# Patient Record
Sex: MALE | Race: BLACK OR AFRICAN AMERICAN | Employment: FULL TIME | URBAN - METROPOLITAN AREA
[De-identification: names, ages, dates, MRNs, and addresses within clinical notes are randomized per-mention and may not be internally consistent; named-entity substitution may affect disease eponyms.]

---

## 2017-01-04 DIAGNOSIS — G50.0 TRIGEMINAL NEURALGIA: Primary | ICD-10-CM

## 2017-01-04 RX ORDER — TRAMADOL HYDROCHLORIDE 50 MG/1
50-100 TABLET ORAL EVERY 6 HOURS PRN
Qty: 60 TABLET | Refills: 0 | Status: SHIPPED | OUTPATIENT
Start: 2017-01-04 | End: 2017-01-06

## 2017-01-04 NOTE — TELEPHONE ENCOUNTER
Tramadol      Last Written Prescription Date:  12/06/16  Last Fill Quantity: 60,   # refills: 0  Last Office Visit with [facility], [facility] or [facility] prescribing provider: 12/21/16  Future Office visit:    Next 5 appointments (look out 90 days)     Jan 06, 2017  3:00 PM   Office Visit with Gonzalo Cole MD   [facility] ([facility])    [address] [place] [id number] [phone]                   Routing refill request to provider for review/approval because:  Drug not on the [facility], [facility] or [facility] refill protocol or controlled substance      Thank you!  Divina Del Valle PhT  [facility]  On behalf of [facility]

## 2017-01-06 ENCOUNTER — OFFICE VISIT (OUTPATIENT)
Dept: FAMILY MEDICINE | Facility: CLINIC | Age: 47
End: 2017-01-06
Payer: COMMERCIAL

## 2017-01-06 VITALS
OXYGEN SATURATION: 96 % | DIASTOLIC BLOOD PRESSURE: 74 MMHG | HEART RATE: 94 BPM | BODY MASS INDEX: 38.44 KG/M2 | WEIGHT: 259.5 LBS | TEMPERATURE: 97.5 F | HEIGHT: 69 IN | RESPIRATION RATE: 18 BRPM | SYSTOLIC BLOOD PRESSURE: 122 MMHG

## 2017-01-06 DIAGNOSIS — F90.2 ATTENTION DEFICIT HYPERACTIVITY DISORDER (ADHD), COMBINED TYPE: ICD-10-CM

## 2017-01-06 DIAGNOSIS — I10 ESSENTIAL HYPERTENSION WITH GOAL BLOOD PRESSURE LESS THAN 140/90: Primary | ICD-10-CM

## 2017-01-06 DIAGNOSIS — F33.40 DEPRESSION, MAJOR, RECURRENT, IN REMISSION (H): ICD-10-CM

## 2017-01-06 DIAGNOSIS — G62.9 NEUROPATHY: ICD-10-CM

## 2017-01-06 PROCEDURE — 99214 OFFICE O/P EST MOD 30 MIN: CPT | Performed by: FAMILY MEDICINE

## 2017-01-06 RX ORDER — TRAMADOL HYDROCHLORIDE 50 MG/1
50-100 TABLET ORAL EVERY 6 HOURS PRN
Qty: 120 TABLET | Refills: 0 | Status: ON HOLD | OUTPATIENT
Start: 2017-01-19 | End: 2017-02-22

## 2017-01-06 ASSESSMENT — PAIN SCALES - GENERAL: PAINLEVEL: NO PAIN (0)

## 2017-01-06 NOTE — NURSING NOTE
"Chief Complaint   Patient presents with     Recheck Medication     oxy and tramidol     Hypertension       Initial /74 mmHg  Pulse 94  Temp(Src) 97.5  F (36.4  C) (Temporal)  Resp 18  Ht 5' 8.9\" (1.75 m)  Wt 259 lb 8 oz (117.708 kg)  BMI 38.44 kg/m2  SpO2 96% Estimated body mass index is 38.44 kg/(m^2) as calculated from the following:    Height as of this encounter: 5' 8.9\" (1.75 m).    Weight as of this encounter: 259 lb 8 oz (117.708 kg).  BP completed using cuff size: regular    "

## 2017-01-06 NOTE — PROGRESS NOTES
"  SUBJECTIVE:                                                    Cecil Vasquez is a 46 year old male who presents to clinic today for the following health issues:      Hypertension Follow-up      Outpatient blood pressures are being checked at home.  Results are 140's/90's.    Low Salt Diet: no added salt       Amount of exercise or physical activity: 2-3 days/week for an average of 15-30 minutes    Problems taking medications regularly: No    Medication side effects: none    Diet: low salt    Would also like to talk about pain medications.   .Luiza Austin CMA    Comes in to discuss chronic pain and peripheral neuropathy issues as well as ongoing mental health. Also here for blood pressure recheck. Currently feeling outstanding. Testes felt in over 6 months. He is coming off Percocet nicely, is only using sparingly. He has tramadol he uses twice daily with good effect. The real help has been gabapentin which is now at 903 times daily. Not expressing side effects with that. No fatigue etc. Takes melatonin at night for sleep. Mood is exceptionally good. He is back at work and quite happy.      Problem list and histories reviewed & adjusted, as indicated.  Additional history: as documented        ROS:  Constitutional, HEENT, cardiovascular, pulmonary, gi and gu systems are negative, except as otherwise noted.    OBJECTIVE:                                                    /74 mmHg  Pulse 94  Temp(Src) 97.5  F (36.4  C) (Temporal)  Resp 18  Ht 5' 8.9\" (1.75 m)  Wt 259 lb 8 oz (117.708 kg)  BMI 38.44 kg/m2  SpO2 96%  Body mass index is 38.44 kg/(m^2).  No exam         ASSESSMENT/PLAN:                                                              ICD-10-CM    1. Trigeminal neuralgia G50.0 traMADol (ULTRAM) 50 MG tablet   2. Attention deficit hyperactivity disorder (ADHD), combined type F90.2    3. Depression, major, recurrent, in remission (H) F33.40        Doing quite well. Continue the tramadol. " Limited use of Percocet, perhaps 30 in a month. Depression improved, as well as anxiety. Continue Wellbutrin and gabapentin. Peripheral neuropathy also controlled. See him back in to 3 months. Sooner as needed. Blood pressure controlled continue same regimen.     Gonzalo Cole MD  [facility]

## 2017-01-26 ENCOUNTER — TELEPHONE (OUTPATIENT)
Dept: FAMILY MEDICINE | Facility: CLINIC | Age: 47
End: 2017-01-26

## 2017-01-26 NOTE — TELEPHONE ENCOUNTER
Panel Management Review      Patient has the following on his problem list:     Asthma review     ACT Total Scores 12/16/2016   ACT TOTAL SCORE -   ASTHMA ER VISITS -   ASTHMA HOSPITALIZATIONS -   ACT TOTAL SCORE (Goal Greater than or Equal to 20) 14   In the past 12 months, how many times did you visit the emergency room for your asthma without being admitted to the hospital? 1   In the past 12 months, how many times were you hospitalized overnight because of your asthma? 0      1. Is Asthma diagnosis on the Problem List? Yes    2. Is Asthma listed on Health Maintenance? Yes    3. Patient is due for:  ACT      Composite cancer screening  Chart review shows that this patient is due/due soon for the following None  Summary:    Patient is due/failing the following:   ACT    Action needed:   Patient needs to do ACT.    Type of outreach:    Phone, left message for patient to call back.     Questions for provider review:    None                                                                                                                                    Luiza Austin CMA      Chart routed to Care Team .

## 2017-02-03 ENCOUNTER — OFFICE VISIT (OUTPATIENT)
Dept: FAMILY MEDICINE | Facility: CLINIC | Age: 47
End: 2017-02-03
Payer: COMMERCIAL

## 2017-02-03 ENCOUNTER — TELEPHONE (OUTPATIENT)
Dept: FAMILY MEDICINE | Facility: CLINIC | Age: 47
End: 2017-02-03

## 2017-02-03 VITALS
RESPIRATION RATE: 18 BRPM | WEIGHT: 256.1 LBS | OXYGEN SATURATION: 99 % | DIASTOLIC BLOOD PRESSURE: 86 MMHG | SYSTOLIC BLOOD PRESSURE: 146 MMHG | TEMPERATURE: 96.8 F | BODY MASS INDEX: 37.93 KG/M2 | HEART RATE: 100 BPM

## 2017-02-03 DIAGNOSIS — G62.9 NEUROPATHY: ICD-10-CM

## 2017-02-03 DIAGNOSIS — R07.9 ACUTE CHEST PAIN: Primary | ICD-10-CM

## 2017-02-03 DIAGNOSIS — J45.901 ASTHMA WITH ACUTE EXACERBATION, UNSPECIFIED ASTHMA SEVERITY: ICD-10-CM

## 2017-02-03 PROCEDURE — 93000 ELECTROCARDIOGRAM COMPLETE: CPT | Performed by: FAMILY MEDICINE

## 2017-02-03 PROCEDURE — 99214 OFFICE O/P EST MOD 30 MIN: CPT | Performed by: FAMILY MEDICINE

## 2017-02-03 RX ORDER — NITROGLYCERIN 0.4 MG/1
TABLET SUBLINGUAL
Qty: 25 TABLET | Refills: 0 | Status: SHIPPED | OUTPATIENT
Start: 2017-02-03 | End: 2017-02-19

## 2017-02-03 RX ORDER — ALBUTEROL SULFATE 90 UG/1
2 AEROSOL, METERED RESPIRATORY (INHALATION) EVERY 4 HOURS PRN
Qty: 1 INHALER | Refills: 0 | Status: ON HOLD | OUTPATIENT
Start: 2017-02-03 | End: 2017-02-20

## 2017-02-03 RX ORDER — OXYCODONE HYDROCHLORIDE 5 MG/1
5-10 TABLET ORAL EVERY 6 HOURS PRN
Qty: 30 TABLET | Refills: 0 | Status: SHIPPED | OUTPATIENT
Start: 2017-02-03 | End: 2017-02-28

## 2017-02-03 RX ORDER — GABAPENTIN 800 MG/1
TABLET ORAL
Qty: 210 TABLET | Refills: 1 | Status: SHIPPED | OUTPATIENT
Start: 2017-02-03 | End: 2017-02-13

## 2017-02-03 ASSESSMENT — PAIN SCALES - GENERAL: PAINLEVEL: SEVERE PAIN (6)

## 2017-02-03 NOTE — TELEPHONE ENCOUNTER
Called patient and left a detailed message stating that he forgot to stop at the lab and get his blood drawn.  Was advised to make a lab only appointment and get blood drawn at his convience.      Luiza Austin CMA

## 2017-02-03 NOTE — PROGRESS NOTES
SUBJECTIVE:                                                    Cecil Vasquez is a 46 year old male who presents to clinic today for the following health issues:      Chief Complaint   Patient presents with     RECHECK     medication-gabapentin     Returns for recheck. His pain is no local controlled on gabapentin, however he ran out and did not get a refill and time. He has been using oxycodone, but less and less. He has a neurology of the right parietal area.    He also complains of chest pain. This is occurring perhaps 5 times the last couple of months. This is new for him. He experiences a sharp pain substernally with exertion, pain also radiates into his left arm, but not into the jaw. He has no prior history of left shoulder issues. He has not noticed any shoulder pain per se. He does have asthma. During the episode lasted perhaps 20 minutes and he has associated diaphoresis and the radiation of pain as described. Last time this occurred he was lifting perhaps 20 pound boxes repetitively. He has a history of mild hypertension, now diet controlled. No personal history of cardiac disease.        Problem list and histories reviewed & adjusted, as indicated.  Additional history: as documented        ROS:  Constitutional, HEENT, cardiovascular, pulmonary, gi and gu systems are negative, except as otherwise noted.    OBJECTIVE:                                                    /86 mmHg  Pulse 100  Temp(Src) 96.8  F (36  C) (Temporal)  Resp 18  Wt 256 lb 1.6 oz (116.166 kg)  SpO2 99%  Body mass index is 37.93 kg/(m^2).  GENERAL: healthy, alert and no distress  EYES: Eyes grossly normal to inspection, PERRL and conjunctivae and sclerae normal  HENT: ear canals and TM's normal, nose and mouth without ulcers or lesions  NECK: no adenopathy, no asymmetry, masses, or scars and thyroid normal to palpation  RESP: lungs clear to auscultation, except for right lower lobe wheezes, good bilateral air entry  CV:  regular rate and rhythm, normal S1 S2, no S3 or S4, no murmur, click or rub, no peripheral edema and peripheral pulses strong  ABDOMEN: soft, nontender, no hepatosplenomegaly, no masses and bowel sounds normal  MS: no gross musculoskeletal defects noted, no edema    Diagnostic Test Results:  EKG shows normal sinus rhythm with a rate of 70. No T wave inversions. Normal intervals. Normal axis.      ASSESSMENT/PLAN:                                                              ICD-10-CM    1. Acute chest pain R07.9 aspirin 81 MG EC tablet     nitroglycerin (NITROSTAT) 0.4 MG sublingual tablet     EKG 12-lead complete w/read - Clinics     CBC with platelets     Basic metabolic panel     XR Chest 2 Views   2. Neuropathy (H) G62.9 gabapentin (NEURONTIN) 800 MG tablet     oxyCODONE (ROXICODONE) 5 MG IR tablet   3. Asthma with acute exacerbation, unspecified asthma severity J45.901 albuterol (ALBUTEROL) 108 (90 BASE) MCG/ACT Inhaler     Chest pain concerning for cardiac etiology. Currently symptom-free. He was given aspirin to start and nitroglycerin to take if pain recurs. Should be seen urgently if pain repeats. Workup so far unremarkable. We will schedule him for a stress EKG. His chronic pain is stable. Refills provided with a small dose change our for convenience. Refill provided for oxycodone. He does have wheezing on exam, but his peak flow was in the yellow zone. He should use his albuterol twice daily 2 puffs. It may be his asthma that is contributing to his chest symptoms during exertion as well. Follow-up based on testing results.      Gonzalo Cole MD  [facility]

## 2017-02-03 NOTE — NURSING NOTE
"Chief Complaint   Patient presents with     RECHECK     medication-gabapentin       Initial /86 mmHg  Pulse 100  Temp(Src) 96.8  F (36  C) (Temporal)  Resp 18  Wt 256 lb 1.6 oz (116.166 kg)  SpO2 99% Estimated body mass index is 37.93 kg/(m^2) as calculated from the following:    Height as of 1/6/17: 5' 8.9\" (1.75 m).    Weight as of this encounter: 256 lb 1.6 oz (116.166 kg).  BP completed using cuff size: Adonay Austin CMA     "

## 2017-02-13 ENCOUNTER — APPOINTMENT (OUTPATIENT)
Dept: GENERAL RADIOLOGY | Facility: CLINIC | Age: 47
End: 2017-02-13
Attending: FAMILY MEDICINE
Payer: COMMERCIAL

## 2017-02-13 ENCOUNTER — HOSPITAL ENCOUNTER (EMERGENCY)
Facility: CLINIC | Age: 47
Discharge: HOME | End: 2017-02-13
Attending: FAMILY MEDICINE | Admitting: FAMILY MEDICINE
Payer: COMMERCIAL

## 2017-02-13 ENCOUNTER — TELEPHONE (OUTPATIENT)
Dept: FAMILY MEDICINE | Facility: CLINIC | Age: 47
End: 2017-02-13

## 2017-02-13 VITALS
HEART RATE: 102 BPM | DIASTOLIC BLOOD PRESSURE: 112 MMHG | WEIGHT: 256 LBS | BODY MASS INDEX: 37.92 KG/M2 | OXYGEN SATURATION: 98 % | RESPIRATION RATE: 9 BRPM | TEMPERATURE: 97.9 F | SYSTOLIC BLOOD PRESSURE: 144 MMHG | HEIGHT: 69 IN

## 2017-02-13 DIAGNOSIS — R07.9 ACUTE CHEST PAIN: Primary | ICD-10-CM

## 2017-02-13 DIAGNOSIS — I10 ESSENTIAL HYPERTENSION WITH GOAL BLOOD PRESSURE LESS THAN 140/90: ICD-10-CM

## 2017-02-13 DIAGNOSIS — F41.0 PANIC ATTACK: ICD-10-CM

## 2017-02-13 DIAGNOSIS — F31.9 BIPOLAR AFFECTIVE DISORDER, REMISSION STATUS UNSPECIFIED (H): ICD-10-CM

## 2017-02-13 DIAGNOSIS — E66.09 NON MORBID OBESITY DUE TO EXCESS CALORIES: ICD-10-CM

## 2017-02-13 DIAGNOSIS — E78.5 HYPERLIPIDEMIA LDL GOAL <160: ICD-10-CM

## 2017-02-13 LAB
ALBUMIN SERPL-MCNC: 4.1 G/DL (ref 3.4–5)
ALP SERPL-CCNC: 79 U/L (ref 40–150)
ALT SERPL W P-5'-P-CCNC: 33 U/L (ref 0–70)
ANION GAP SERPL CALCULATED.3IONS-SCNC: 8 MMOL/L (ref 3–14)
AST SERPL W P-5'-P-CCNC: 20 U/L (ref 0–45)
BASOPHILS # BLD AUTO: 0 10E9/L (ref 0–0.2)
BASOPHILS NFR BLD AUTO: 0.3 %
BILIRUB SERPL-MCNC: 0.2 MG/DL (ref 0.2–1.3)
BUN SERPL-MCNC: 20 MG/DL (ref 7–30)
CALCIUM SERPL-MCNC: 9.1 MG/DL (ref 8.5–10.1)
CHLORIDE SERPL-SCNC: 104 MMOL/L (ref 94–109)
CO2 SERPL-SCNC: 30 MMOL/L (ref 20–32)
CREAT SERPL-MCNC: 0.84 MG/DL (ref 0.66–1.25)
CRP SERPL-MCNC: 10 MG/L (ref 0–8)
D DIMER PPP FEU-MCNC: 0.3 UG/ML FEU (ref 0–0.5)
DIFFERENTIAL METHOD BLD: NORMAL
EOSINOPHIL # BLD AUTO: 0.1 10E9/L (ref 0–0.7)
EOSINOPHIL NFR BLD AUTO: 0.8 %
ERYTHROCYTE [DISTWIDTH] IN BLOOD BY AUTOMATED COUNT: 13.7 % (ref 10–15)
GFR SERPL CREATININE-BSD FRML MDRD: ABNORMAL ML/MIN/1.7M2
GLUCOSE SERPL-MCNC: 140 MG/DL (ref 70–99)
HCT VFR BLD AUTO: 41.2 % (ref 40–53)
HGB BLD-MCNC: 14.1 G/DL (ref 13.3–17.7)
IMM GRANULOCYTES # BLD: 0.1 10E9/L (ref 0–0.4)
IMM GRANULOCYTES NFR BLD: 1.3 %
LIPASE SERPL-CCNC: 173 U/L (ref 73–393)
LYMPHOCYTES # BLD AUTO: 1.6 10E9/L (ref 0.8–5.3)
LYMPHOCYTES NFR BLD AUTO: 21.9 %
MCH RBC QN AUTO: 28.9 PG (ref 26.5–33)
MCHC RBC AUTO-ENTMCNC: 34.2 G/DL (ref 31.5–36.5)
MCV RBC AUTO: 84 FL (ref 78–100)
MONOCYTES # BLD AUTO: 0.5 10E9/L (ref 0–1.3)
MONOCYTES NFR BLD AUTO: 7 %
NEUTROPHILS # BLD AUTO: 5.1 10E9/L (ref 1.6–8.3)
NEUTROPHILS NFR BLD AUTO: 68.7 %
PLATELET # BLD AUTO: 205 10E9/L (ref 150–450)
POTASSIUM SERPL-SCNC: 3.9 MMOL/L (ref 3.4–5.3)
PROT SERPL-MCNC: 7.7 G/DL (ref 6.8–8.8)
RBC # BLD AUTO: 4.88 10E12/L (ref 4.4–5.9)
SODIUM SERPL-SCNC: 142 MMOL/L (ref 133–144)
TROPONIN I SERPL-MCNC: NORMAL UG/L (ref 0–0.04)
WBC # BLD AUTO: 7.4 10E9/L (ref 4–11)

## 2017-02-13 PROCEDURE — 86140 C-REACTIVE PROTEIN: CPT | Performed by: FAMILY MEDICINE

## 2017-02-13 PROCEDURE — 93010 ELECTROCARDIOGRAM REPORT: CPT | Performed by: FAMILY MEDICINE

## 2017-02-13 PROCEDURE — 83690 ASSAY OF LIPASE: CPT | Performed by: FAMILY MEDICINE

## 2017-02-13 PROCEDURE — 71020 XR CHEST 2 VW: CPT | Mod: TC

## 2017-02-13 PROCEDURE — 93005 ELECTROCARDIOGRAM TRACING: CPT

## 2017-02-13 PROCEDURE — 80053 COMPREHEN METABOLIC PANEL: CPT | Performed by: FAMILY MEDICINE

## 2017-02-13 PROCEDURE — 84484 ASSAY OF TROPONIN QUANT: CPT | Performed by: FAMILY MEDICINE

## 2017-02-13 PROCEDURE — 85025 COMPLETE CBC W/AUTO DIFF WBC: CPT | Performed by: FAMILY MEDICINE

## 2017-02-13 PROCEDURE — 99285 EMERGENCY DEPT VISIT HI MDM: CPT | Mod: 25 | Performed by: FAMILY MEDICINE

## 2017-02-13 PROCEDURE — 85379 FIBRIN DEGRADATION QUANT: CPT | Performed by: FAMILY MEDICINE

## 2017-02-13 PROCEDURE — 99285 EMERGENCY DEPT VISIT HI MDM: CPT | Mod: 25

## 2017-02-13 RX ORDER — LORAZEPAM 0.5 MG/1
0.5 TABLET ORAL EVERY 8 HOURS PRN
Qty: 10 TABLET | Refills: 0 | Status: SHIPPED | OUTPATIENT
Start: 2017-02-13 | End: 2017-02-19

## 2017-02-13 RX ORDER — ASPIRIN 81 MG/1
324 TABLET, CHEWABLE ORAL ONCE
Status: DISCONTINUED | OUTPATIENT
Start: 2017-02-13 | End: 2017-02-13 | Stop reason: HOSPADM

## 2017-02-13 RX ORDER — LIDOCAINE 40 MG/G
CREAM TOPICAL
Status: DISCONTINUED | OUTPATIENT
Start: 2017-02-13 | End: 2017-02-13 | Stop reason: HOSPADM

## 2017-02-13 NOTE — TELEPHONE ENCOUNTER
Reason for Call: Request for an order or referral:    Order or referral being requested: CST order- there is a chest xray order placed but patient seemed to think it was a stress test that was needed. I did see in his notes that he said EKG stress test? Please check and place order and call patient when done.    Date needed: at your convenience    Has the patient been seen by the PCP for this problem? YES    Additional comments: none    Phone number Patient can be reached at:  Home number on file [phone] (home)    Best Time:  any    Can we leave a detailed message on this number?  YES    Call taken on 2/13/2017 at 1:32 PM by Leola Lamar

## 2017-02-13 NOTE — ED AVS SNAPSHOT
[facility]    [address] [place] [address]    Phone:  [phone]    Fax:  [phone]                                       Cecil Vasquez   MRN: [id number]    Department:  [facility] Department   Date of Visit:  2/13/2017           After Visit Summary Signature Page     I have received my discharge instructions, and my questions have been answered. I have discussed any challenges I see with this plan with the nurse or doctor.    ..........................................................................................................................................  Patient/Patient Representative Signature      ..........................................................................................................................................  Patient Representative Print Name and Relationship to Patient    ..................................................               ................................................  Date                                            Time    ..........................................................................................................................................  Reviewed by Signature/Title    ...................................................              ..............................................  Date                                                            Time

## 2017-02-13 NOTE — ED AVS SNAPSHOT
[facility] Emergency Department    [address] [place] [address]    Phone:  [phone]    Fax:  [phone]                                       Cecil Vasquez   MRN: [id number]    Department:  [facility] Emergency Department   Date of Visit:  2/13/2017           Patient Information     Date Of Birth          1970        Your diagnoses for this visit were:     Panic attack     Essential hypertension with goal blood pressure less than 140/90     Non morbid obesity due to excess calories     Hyperlipidemia LDL goal <160     Bipolar affective disorder, remission status unspecified (H)        You were seen by Georgi Schmitt DO.      Follow-up Information     Schedule an appointment as soon as possible for a visit with Gonzalo Cole MD.    Specialty:  Family Practice    Why:  later this week for recheck    Contact information:    [facility]  [address] [place] [address]  [phone]          Discharge Instructions       Please read and follow the handout(s) instructions. Return, if needed, for increased or worsening symptoms and as directed by the handout(s).    Follow-up with Dr. Cole later this week to discuss better long-term options for treatment of panic attacks.    Continue with the cardiac stress test as planned. Your chest x-ray was already done today so will not need this done in the clinic.    I hope you feel better soon!    Electronically signed, Georgi Schmitt DO      Discharge References/Attachments     PANIC DISORDER (PANIC ATTACK), UNDERSTANDING (ENGLISH)    PANIC DISORDER, TREATING WITH MEDICINE (ENGLISH)    PANIC DISORDER (PANIC ATTACK), TREATING WITH THERAPY (ENGLISH)      Future Appointments        Provider Department Dept Phone Center    2/15/2017 9:45 AM [facility] LAB [facility] [phone] [facility]      24 Hour Appointment Hotline       To make an appointment at any [facility], call  [phone] ([phone]). If you don't have a family doctor or clinic, we will help you find one. [facility] are conveniently located to serve the needs of you and your family.             Review of your medicines      START taking        Dose / Directions Last dose taken    LORazepam 0.5 MG tablet   Commonly known as:  ATIVAN   Dose:  0.5 mg   Quantity:  10 tablet        Take 1 tablet (0.5 mg) by mouth every 8 hours as needed for anxiety   Refills:  0          Our records show that you are taking the medicines listed below. If these are incorrect, please call your family doctor or clinic.        Dose / Directions Last dose taken    acetaminophen 325 MG tablet   Commonly known as:  TYLENOL   Dose:  650 mg   Quantity:  100 tablet        Take 2 tablets (650 mg) by mouth every 4 hours as needed for mild pain   Refills:  11        albuterol 108 (90 BASE) MCG/ACT Inhaler   Commonly known as:  albuterol   Dose:  2 puff   Quantity:  1 Inhaler        Inhale 2 puffs into the lungs every 4 hours as needed for shortness of breath / dyspnea   Refills:  0        aspirin 81 MG EC tablet   Dose:  81 mg   Quantity:  90 tablet        Take 1 tablet (81 mg) by mouth daily   Refills:  3        ipratropium - albuterol 0.5 mg/2.5 mg/3 mL 0.5-2.5 (3) MG/3ML neb solution   Commonly known as:  DUONEB   Dose:  1 vial   Quantity:  30 vial        Take 1 vial (3 mLs) by nebulization every 4 hours as needed for shortness of breath / dyspnea   Refills:  0        melatonin 3 MG tablet   Dose:  3 mg   Quantity:  30 tablet        Take 1 tablet (3 mg) by mouth nightly as needed for sleep   Refills:  1        nitroglycerin 0.4 MG sublingual tablet   Commonly known as:  NITROSTAT   Quantity:  25 tablet        For chest pain place 1 tablet under the tongue every 5 minutes for 3 doses. If symptoms persist 5 minutes after 1st dose call 911.   Refills:  0        oxyCODONE 5 MG IR tablet   Commonly known as:  ROXICODONE   Dose:  5-10 mg  "  Quantity:  30 tablet        Take 1-2 tablets (5-10 mg) by mouth every 6 hours as needed for moderate to severe pain 30 day supply   Refills:  0        traMADol 50 MG tablet   Commonly known as:  ULTRAM   Dose:   mg   Quantity:  120 tablet        Take 1-2 tablets ( mg) by mouth every 6 hours as needed for moderate pain   Refills:  0                Prescriptions were sent or printed at these locations (1 Prescription)                   [facility]   [address] [address]    Telephone:  [phone]   Fax:  [phone]   Hours:                  Printed at Department/Unit printer (1 of 1)         LORazepam (ATIVAN) 0.5 MG tablet                Procedures and tests performed during your visit     CBC with platelets differential    CRP inflammation    Comprehensive metabolic panel    D dimer quantitative    EKG 12 lead    Lipase    Troponin I    XR Chest 2 Views      Orders Needing Specimen Collection     None      Pending Results     No orders found from 2/11/2017 to 2/14/2017.            Pending Culture Results     No orders found from 2/11/2017 to 2/14/2017.            Thank you for choosing [facility]       Thank you for choosing [facility] for your care. Our goal is always to provide you with excellent care. Hearing back from our patients is one way we can continue to improve our services. Please take a few minutes to complete the written survey that you may receive in the mail after you visit with us. Thank you!        [employer] Information     [employer] lets you send messages to your doctor, view your test results, renew your prescriptions, schedule appointments and more. To sign up, go to [email or web address] . Click on \"Log in\" on the left side of the screen, which will take you to the Welcome page. Then click on \"Sign up Now\" on the right side of the page.     You will be asked to enter the access code listed below, as well as some personal information. " Please follow the directions to create your username and password.     Your access code is: [email or web address]  Expires: 2017  8:49 PM     Your access code will  in 90 days. If you need help or a new code, please call your [place] clinic or [phone].        Care EveryWhere ID     This is your Care EveryWhere ID. This could be used by other organizations to access your [place] medical records  [id number]        After Visit Summary       This is your record. Keep this with you and show to your community pharmacist(s) and doctor(s) at your next visit.

## 2017-02-14 ENCOUNTER — TELEPHONE (OUTPATIENT)
Dept: FAMILY MEDICINE | Facility: CLINIC | Age: 47
End: 2017-02-14

## 2017-02-14 DIAGNOSIS — R07.9 ACUTE CHEST PAIN: ICD-10-CM

## 2017-02-14 DIAGNOSIS — G62.9 NEUROPATHY: ICD-10-CM

## 2017-02-14 DIAGNOSIS — F41.0 PANIC ATTACK: Primary | ICD-10-CM

## 2017-02-14 LAB
ANION GAP SERPL CALCULATED.3IONS-SCNC: 7 MMOL/L (ref 3–14)
BUN SERPL-MCNC: 16 MG/DL (ref 7–30)
CALCIUM SERPL-MCNC: 9.4 MG/DL (ref 8.5–10.1)
CHLORIDE SERPL-SCNC: 105 MMOL/L (ref 94–109)
CO2 SERPL-SCNC: 28 MMOL/L (ref 20–32)
CREAT SERPL-MCNC: 0.82 MG/DL (ref 0.66–1.25)
ERYTHROCYTE [DISTWIDTH] IN BLOOD BY AUTOMATED COUNT: 13.8 % (ref 10–15)
GFR SERPL CREATININE-BSD FRML MDRD: ABNORMAL ML/MIN/1.7M2
GLUCOSE SERPL-MCNC: 124 MG/DL (ref 70–99)
HCT VFR BLD AUTO: 41.8 % (ref 40–53)
HGB BLD-MCNC: 14 G/DL (ref 13.3–17.7)
MCH RBC QN AUTO: 28.5 PG (ref 26.5–33)
MCHC RBC AUTO-ENTMCNC: 33.5 G/DL (ref 31.5–36.5)
MCV RBC AUTO: 85 FL (ref 78–100)
PLATELET # BLD AUTO: 218 10E9/L (ref 150–450)
POTASSIUM SERPL-SCNC: 4.1 MMOL/L (ref 3.4–5.3)
RBC # BLD AUTO: 4.91 10E12/L (ref 4.4–5.9)
SODIUM SERPL-SCNC: 140 MMOL/L (ref 133–144)
WBC # BLD AUTO: 8.6 10E9/L (ref 4–11)

## 2017-02-14 PROCEDURE — 85027 COMPLETE CBC AUTOMATED: CPT | Performed by: FAMILY MEDICINE

## 2017-02-14 PROCEDURE — 36415 COLL VENOUS BLD VENIPUNCTURE: CPT | Performed by: FAMILY MEDICINE

## 2017-02-14 PROCEDURE — 80048 BASIC METABOLIC PNL TOTAL CA: CPT | Performed by: FAMILY MEDICINE

## 2017-02-14 RX ORDER — LORAZEPAM 0.5 MG/1
.25-.5 TABLET ORAL EVERY 8 HOURS PRN
Qty: 20 TABLET | Refills: 0 | Status: ON HOLD | OUTPATIENT
Start: 2017-02-14 | End: 2017-02-21

## 2017-02-14 RX ORDER — GABAPENTIN 800 MG/1
TABLET ORAL
Qty: 210 TABLET | Refills: 1 | Status: ON HOLD | COMMUNITY
Start: 2017-02-14 | End: 2017-02-21

## 2017-02-14 NOTE — ED NOTES
Pt presents with mid chest pain radiating into bilateral jaw. Bilateral arm weakness. Started 3 hours ago. Pt has had issues with chest pain the last 30 days. Pt seeing Dr. Escobar. Pt has nitro glycerin but has not used. Pt has stress test and labs scheduled for Weds. Denies nausea or shortness of breath. Diaphoresis intermittently. Severe pain earlier. Left arm pain.

## 2017-02-14 NOTE — TELEPHONE ENCOUNTER
Patient called and reported that he went to the ER last night and was put on ativan, he stated that it really helped and he is wondering if he could a prescription for it.  Routed to provider.    Luiza Austin CMA

## 2017-02-14 NOTE — ED PROVIDER NOTES
History     Chief Complaint   Patient presents with     Chest Pain     Pt states for 3 hours. Jaw pain.  Bilateral arm weakness. Strong family history. High cholesterol. Pt scheduled for stress test and labs on Weds.      HPI  Cecil Vasquez is a 46 year old male who presents to the ER with concerns about multiple episodes over the last 3 weeks of sudden onset of chest pain associated with diaphoresis, jaw pain, and bilateral arm weakness.  Patient states that he's been seen in his clinic for these symptoms earlier this month with Dr. Cole.  He states that he had multiple episodes this afternoon while at work.  States the episodes last about 3-5 minutes on average.  He noted yesterday that the symptoms seemed to improve more rapidly when he used his albuterol inhaler.  Patient states that he recently stopped his Neurontin and his oxycodone 2 days ago because he thought maybe they were contributing to his episodes.  He states he takes both these medications for his trigeminal neuralgia condition.  Patient denies prior history of cardiac abnormality's.  He currently has no symptoms at the time of presentation to the ER.  Patient states that he has elevated blood pressure and is on a antihypertensive medication but he can't remember the name of it.  He states his pressures are usually high despite the medication.  He also admits to a history of obesity and high cholesterol.  He is not a current smoker but has in the past.  He denies significant shortness of breath during these episodes but used the inhaler just to see if that might help and it seemed to help some.  He states he used the inhaler about 8 puffs this afternoon.      I have reviewed the Medications, Allergies, Past Medical and Surgical History, and Social History in the Epic system.  Patient Active Problem List   Diagnosis     Bipolar disorder (H)     Intermittent asthma     Hyperlipidemia LDL goal <160     Impaired fasting glucose     Attention  deficit hyperactivity disorder (ADHD)     Depression, major, recurrent, in remission (H)     Obesity     Rosacea     Head and face pain     Renal calcification     Nephrolithiasis     Essential hypertension with goal blood pressure less than 140/90     Neuropathy (H)       Past Medical History   Diagnosis Date     ADHD (attention deficit hyperactivity disorder)      Bipolar 1 disorder (H)      Depression      Gastro-oesophageal reflux disease      Hyperlipidaemia      Hypertension      Impaired fasting glucose      Nephrolithiasis      Obesity      Reactive airway disease      uses albuterol inhaler when has cold     Renal calcification      Rosacea      Tobacco abuse      Trigeminal neuralgia      Uncomplicated asthma         Past Surgical History   Procedure Laterality Date     Tonsillectomy       Genitourinary surgery  3 25 2016      Lithotripsy and stent placement     Extracorporeal shock wave lithotripsy (eswl) Left 3/25/2016     Procedure: EXTRACORPOREAL SHOCK WAVE LITHOTRIPSY (ESWL);  Surgeon: Jorden Villafana MD;  Location:  OR     Combined cystoscopy, insert catheter ureter Left 3/25/2016     Procedure: COMBINED CYSTOSCOPY, INSERT CATHETER URETER;  Surgeon: Jorden Villafana MD;  Location:  OR     Extracorporeal shock wave lithotripsy, cystoscopy, insert stent ureter(s), combined Left 3/25/2016     Procedure: COMBINED EXTRACORPOREAL SHOCK WAVE LITHOTRIPSY, CYSTOSCOPY, INSERT STENT URETER(S);  Surgeon: Jorden Villafana MD;  Location:  OR     Combined cystoscopy, retrogrades, ureteroscopy, laser holmium lithotripsy ureter(s), insert stent Left 4/13/2016     Procedure: COMBINED CYSTOSCOPY, RETROGRADES, URETEROSCOPY, LASER HOLMIUM LITHOTRIPSY URETER(S), INSERT STENT;  Surgeon: Jorden Villafana MD;  Location:  OR       Family History   Problem Relation Age of Onset     Psychotic Disorder Mother      DIABETES Sister      Psychotic Disorder Sister        Social History     Social History      Marital status:      Spouse name: N/A     Number of children: N/A     Years of education: N/A     Occupational History     Not on file.     Social History Main Topics     Smoking status: Former Smoker     Packs/day: 0.50     Types: Cigarettes     Smokeless tobacco: Never Used      Comment: 1 pk a week     Alcohol use No      Comment: WEEKLY     Drug use: No     Sexual activity: Not Currently     Other Topics Concern      Service No     Blood Transfusions No     Caffeine Concern No     Occupational Exposure No     Hobby Hazards No     Sleep Concern Yes     Stress Concern No     Weight Concern Yes     Special Diet No     low sodium     Back Care No     Exercise No     Seat Belt Yes     Self-Exams Yes     Social History Narrative        3 children    Working at [employer] "[employer]       Current Outpatient Rx   Medication Sig Dispense Refill     LORazepam (ATIVAN) 0.5 MG tablet Take 1 tablet (0.5 mg) by mouth every 8 hours as needed for anxiety 10 tablet 0     aspirin 81 MG EC tablet Take 1 tablet (81 mg) by mouth daily 90 tablet 3     oxyCODONE (ROXICODONE) 5 MG IR tablet Take 1-2 tablets (5-10 mg) by mouth every 6 hours as needed for moderate to severe pain 30 day supply 30 tablet 0     albuterol (ALBUTEROL) 108 (90 BASE) MCG/ACT Inhaler Inhale 2 puffs into the lungs every 4 hours as needed for shortness of breath / dyspnea 1 Inhaler 0     traMADol (ULTRAM) 50 MG tablet Take 1-2 tablets ( mg) by mouth every 6 hours as needed for moderate pain 120 tablet 0     acetaminophen (TYLENOL) 325 MG tablet Take 2 tablets (650 mg) by mouth every 4 hours as needed for mild pain 100 tablet 11     melatonin 3 MG tablet Take 1 tablet (3 mg) by mouth nightly as needed for sleep 30 tablet 1     gabapentin (NEURONTIN) 800 MG tablet Take 800mg am and lunchtime, then 1200mg at night 210 tablet 1     LORazepam (ATIVAN) 0.5 MG tablet Take 0.5-1 tablets (0.25-0.5 mg) by mouth every 8 hours as needed for anxiety 20  "tablet 0     nitroglycerin (NITROSTAT) 0.4 MG sublingual tablet For chest pain place 1 tablet under the tongue every 5 minutes for 3 doses. If symptoms persist 5 minutes after 1st dose call 911. 25 tablet 0     ipratropium - albuterol 0.5 mg/2.5 mg/3 mL (DUONEB) 0.5-2.5 (3) MG/3ML neb solution Take 1 vial (3 mLs) by nebulization every 4 hours as needed for shortness of breath / dyspnea 30 vial 0       Allergies   Allergen Reactions     No Known Drug Allergies        Immunization History   Administered Date(s) Administered     Influenza (IIV3) 11/15/2014     Influenza Vaccine IM 3yrs+ 4 Valent IIV4 11/19/2015     Tdap (Adacel,Boostrix) 05/29/2013       Review of Systems   Constitutional: Positive for activity change and fatigue. Negative for appetite change, chills and fever.   HENT: Negative.  Negative for facial swelling and mouth sores.         Left jaw pain.   Eyes: Negative.    Respiratory: Positive for chest tightness. Negative for shortness of breath and wheezing.    Cardiovascular: Positive for chest pain. Negative for palpitations and leg swelling.   Gastrointestinal: Negative.    Genitourinary: Negative.    Musculoskeletal: Negative.    Skin: Negative for rash and wound.   Neurological: Negative.    Psychiatric/Behavioral: The patient is nervous/anxious.    All other systems reviewed and are negative.      Physical Exam   BP: (!) 162/106  Pulse: 102  Heart Rate: 107  Temp: 97.9  F (36.6  C)  Resp: 20  Height: 175.3 cm (5' 9\")  Weight: 116.1 kg (256 lb)  SpO2: 97 %  Physical Exam   Constitutional: He is oriented to person, place, and time. He appears well-developed and well-nourished. He appears distressed (  Anxious.).   HENT:   Head: Normocephalic and atraumatic.   Right Ear: External ear normal.   Mouth/Throat: Oropharynx is clear and moist.   Eyes: Conjunctivae and EOM are normal. Pupils are equal, round, and reactive to light.   Neck: Trachea normal and normal range of motion. Neck supple. Normal " carotid pulses, no hepatojugular reflux and no JVD present. Carotid bruit is not present. No thyroid mass and no thyromegaly present.   Cardiovascular: Normal rate, normal heart sounds and intact distal pulses.    No murmur heard.  Pulmonary/Chest: Effort normal. No stridor. No respiratory distress.   Abdominal: Soft. There is no tenderness.   Musculoskeletal: Normal range of motion.   Neurological: He is alert and oriented to person, place, and time.   Skin: Skin is warm. No rash noted.   Psychiatric: His behavior is normal. Thought content normal. His mood appears anxious. His speech is rapid and/or pressured. Cognition and memory are normal.   Nursing note and vitals reviewed.      ED Course     ED Course     Procedures             EKG Interpretation:      Interpreted by Georgi Schmitt  Time reviewed: 19:15  Symptoms at time of EKG: Chest pain   Rhythm: normal sinus   Rate: normal  Axis: normal  Ectopy: none  Conduction: normal  ST Segments/ T Waves: No ST-T wave changes  Q Waves: none  Comparison to prior: Unchanged    Clinical Impression: normal EKG          Critical Care time:  none             Results for orders placed or performed during the hospital encounter of 02/13/17   XR Chest 2 Views    Narrative    CHEST TWO VIEWS  2/13/2017 7:36 PM     HISTORY: Chest pain.    COMPARISON: 2/3/2015.      Impression    IMPRESSION: Chest is negative and unchanged. Lungs clear.    VADIM SIMMONS MD   CBC with platelets differential   Result Value Ref Range    WBC 7.4 4.0 - 11.0 10e9/L    RBC Count 4.88 4.4 - 5.9 10e12/L    Hemoglobin 14.1 13.3 - 17.7 g/dL    Hematocrit 41.2 40.0 - 53.0 %    MCV 84 78 - 100 fl    MCH 28.9 26.5 - 33.0 pg    MCHC 34.2 31.5 - 36.5 g/dL    RDW 13.7 10.0 - 15.0 %    Platelet Count 205 150 - 450 10e9/L    Diff Method Automated Method     % Neutrophils 68.7 %    % Lymphocytes 21.9 %    % Monocytes 7.0 %    % Eosinophils 0.8 %    % Basophils 0.3 %    % Immature Granulocytes 1.3 %     Absolute Neutrophil 5.1 1.6 - 8.3 10e9/L    Absolute Lymphocytes 1.6 0.8 - 5.3 10e9/L    Absolute Monocytes 0.5 0.0 - 1.3 10e9/L    Absolute Eosinophils 0.1 0.0 - 0.7 10e9/L    Absolute Basophils 0.0 0.0 - 0.2 10e9/L    Abs Immature Granulocytes 0.1 0 - 0.4 10e9/L   Troponin I   Result Value Ref Range    Troponin I ES  0.000 - 0.045 ug/L     <0.015  The 99th percentile for upper reference range is 0.045 ug/L.  Troponin values in   the range of 0.045 - 0.120 ug/L may be associated with risks of adverse   clinical events.     D dimer quantitative   Result Value Ref Range    D Dimer 0.3 0.0 - 0.50 ug/ml FE   Comprehensive metabolic panel   Result Value Ref Range    Sodium 142 133 - 144 mmol/L    Potassium 3.9 3.4 - 5.3 mmol/L    Chloride 104 94 - 109 mmol/L    Carbon Dioxide 30 20 - 32 mmol/L    Anion Gap 8 3 - 14 mmol/L    Glucose 140 (H) 70 - 99 mg/dL    Urea Nitrogen 20 7 - 30 mg/dL    Creatinine 0.84 0.66 - 1.25 mg/dL    GFR Estimate >90  Non  GFR Calc   >60 mL/min/1.7m2    GFR Estimate If Black >90   GFR Calc   >60 mL/min/1.7m2    Calcium 9.1 8.5 - 10.1 mg/dL    Bilirubin Total 0.2 0.2 - 1.3 mg/dL    Albumin 4.1 3.4 - 5.0 g/dL    Protein Total 7.7 6.8 - 8.8 g/dL    Alkaline Phosphatase 79 40 - 150 U/L    ALT 33 0 - 70 U/L    AST 20 0 - 45 U/L   Lipase   Result Value Ref Range    Lipase 173 73 - 393 U/L   CRP inflammation   Result Value Ref Range    CRP Inflammation 10.0 (H) 0.0 - 8.0 mg/L       Assessments & Plan (with Medical Decision Making)   46-year-old male to the emergency room with concern about chest pain with radiation to his left neck and jaw.  Symptoms have been present for several hours.  Exam findings are negative for any evidence of acute ischemic changes.  No evidence suggestive of acute pulmonary embolism.  Patient is with symptoms suggestive of acute intermittent panic attacks.  Patient is scheduled for a cardiac stress test and is encouraged to continue with that  testing later this week.  Patient with history of her bipolar disorder and I suspect that his symptoms today are associated with anxiety/panic attack.  Patient was given a few tablets of lorazepam to use only if needed for anxiety attack.  He plans to make an appointment with Dr. Cole to discuss additional options to help control these episodes.     I have reviewed the nursing notes.    I have reviewed the findings, diagnosis, plan and need for follow up with the patient.    Discharge Medication List as of 2/13/2017  8:49 PM      START taking these medications    Details   LORazepam (ATIVAN) 0.5 MG tablet Take 1 tablet (0.5 mg) by mouth every 8 hours as needed for anxiety, Disp-10 tablet, R-0, Local Print                  I verbally discussed the findings of the evaluation today in the ER. I have verbally discussed with Cecil the suggested treatment(s) as described in the discharge instructions and handouts. I have prescribed the above listed medications and instructed him on appropriate use of these medications.      I have verbally suggested he follow-up in his clinic or return to the ER for increased symptoms. See the follow-up recommendations documented  in the after visit summary in this visit's EPIC chart.    Final diagnoses:   Panic attack   Essential hypertension with goal blood pressure less than 140/90   Non morbid obesity due to excess calories   Hyperlipidemia LDL goal <160   Bipolar affective disorder, remission status unspecified (H)       2/13/2017   [facility] EMERGENCY DEPARTMENT     Georgi Schmitt,   02/15/17 0013

## 2017-02-14 NOTE — TELEPHONE ENCOUNTER
Scheduling sent message of needing corrected order placed for stress test and that they will call patient to schedule. Corrected order placed and message to scheduling. Na Murillo LPN

## 2017-02-14 NOTE — DISCHARGE INSTRUCTIONS
Please read and follow the handout(s) instructions. Return, if needed, for increased or worsening symptoms and as directed by the handout(s).    Follow-up with Dr. Cole later this week to discuss better long-term options for treatment of panic attacks.    Continue with the cardiac stress test as planned. Your chest x-ray was already done today so will not need this done in the clinic.    I hope you feel better soon!    Electronically signed, Georgi Schmitt DO

## 2017-02-15 ASSESSMENT — ENCOUNTER SYMPTOMS
EYES NEGATIVE: 1
GASTROINTESTINAL NEGATIVE: 1
APPETITE CHANGE: 0
NEUROLOGICAL NEGATIVE: 1
WHEEZING: 0
SHORTNESS OF BREATH: 0
NERVOUS/ANXIOUS: 1
FEVER: 0
CHILLS: 0
ACTIVITY CHANGE: 1
WOUND: 0
CHEST TIGHTNESS: 1
MUSCULOSKELETAL NEGATIVE: 1
FATIGUE: 1
FACIAL SWELLING: 0
PALPITATIONS: 0

## 2017-02-19 ENCOUNTER — HOSPITAL ENCOUNTER (EMERGENCY)
Facility: CLINIC | Age: 47
Discharge: HOME | End: 2017-02-19
Attending: FAMILY MEDICINE | Admitting: FAMILY MEDICINE
Payer: COMMERCIAL

## 2017-02-19 ENCOUNTER — APPOINTMENT (OUTPATIENT)
Dept: GENERAL RADIOLOGY | Facility: CLINIC | Age: 47
End: 2017-02-19
Attending: FAMILY MEDICINE
Payer: COMMERCIAL

## 2017-02-19 VITALS
HEART RATE: 103 BPM | DIASTOLIC BLOOD PRESSURE: 92 MMHG | TEMPERATURE: 98.2 F | WEIGHT: 260 LBS | HEIGHT: 69 IN | RESPIRATION RATE: 19 BRPM | OXYGEN SATURATION: 98 % | BODY MASS INDEX: 38.51 KG/M2 | SYSTOLIC BLOOD PRESSURE: 139 MMHG

## 2017-02-19 DIAGNOSIS — R07.89 ATYPICAL CHEST PAIN: ICD-10-CM

## 2017-02-19 DIAGNOSIS — I10 ESSENTIAL HYPERTENSION WITH GOAL BLOOD PRESSURE LESS THAN 140/90: ICD-10-CM

## 2017-02-19 LAB
ANION GAP SERPL CALCULATED.3IONS-SCNC: 12 MMOL/L (ref 3–14)
BASOPHILS # BLD AUTO: 0 10E9/L (ref 0–0.2)
BASOPHILS NFR BLD AUTO: 0.2 %
BUN SERPL-MCNC: 16 MG/DL (ref 7–30)
CALCIUM SERPL-MCNC: 8.8 MG/DL (ref 8.5–10.1)
CHLORIDE SERPL-SCNC: 107 MMOL/L (ref 94–109)
CO2 SERPL-SCNC: 24 MMOL/L (ref 20–32)
CREAT SERPL-MCNC: 0.92 MG/DL (ref 0.66–1.25)
DIFFERENTIAL METHOD BLD: NORMAL
EOSINOPHIL # BLD AUTO: 0.1 10E9/L (ref 0–0.7)
EOSINOPHIL NFR BLD AUTO: 0.9 %
ERYTHROCYTE [DISTWIDTH] IN BLOOD BY AUTOMATED COUNT: 13.9 % (ref 10–15)
GFR SERPL CREATININE-BSD FRML MDRD: 87 ML/MIN/1.7M2
GLUCOSE SERPL-MCNC: 138 MG/DL (ref 70–99)
HCT VFR BLD AUTO: 40.9 % (ref 40–53)
HGB BLD-MCNC: 14 G/DL (ref 13.3–17.7)
IMM GRANULOCYTES # BLD: 0.1 10E9/L (ref 0–0.4)
IMM GRANULOCYTES NFR BLD: 1.5 %
LYMPHOCYTES # BLD AUTO: 1.7 10E9/L (ref 0.8–5.3)
LYMPHOCYTES NFR BLD AUTO: 21.5 %
MCH RBC QN AUTO: 28.8 PG (ref 26.5–33)
MCHC RBC AUTO-ENTMCNC: 34.2 G/DL (ref 31.5–36.5)
MCV RBC AUTO: 84 FL (ref 78–100)
MONOCYTES # BLD AUTO: 0.6 10E9/L (ref 0–1.3)
MONOCYTES NFR BLD AUTO: 7.2 %
NEUTROPHILS # BLD AUTO: 5.5 10E9/L (ref 1.6–8.3)
NEUTROPHILS NFR BLD AUTO: 68.7 %
PLATELET # BLD AUTO: 218 10E9/L (ref 150–450)
POTASSIUM SERPL-SCNC: 4 MMOL/L (ref 3.4–5.3)
RBC # BLD AUTO: 4.86 10E12/L (ref 4.4–5.9)
SODIUM SERPL-SCNC: 143 MMOL/L (ref 133–144)
TROPONIN I SERPL-MCNC: 0.04 UG/L (ref 0–0.04)
WBC # BLD AUTO: 8.1 10E9/L (ref 4–11)

## 2017-02-19 PROCEDURE — 93010 ELECTROCARDIOGRAM REPORT: CPT | Performed by: FAMILY MEDICINE

## 2017-02-19 PROCEDURE — 25000132 ZZH RX MED GY IP 250 OP 250 PS 637: Performed by: FAMILY MEDICINE

## 2017-02-19 PROCEDURE — 80048 BASIC METABOLIC PNL TOTAL CA: CPT | Performed by: FAMILY MEDICINE

## 2017-02-19 PROCEDURE — 85025 COMPLETE CBC W/AUTO DIFF WBC: CPT | Performed by: FAMILY MEDICINE

## 2017-02-19 PROCEDURE — 71010 XR CHEST PORT 1 VW: CPT | Mod: TC

## 2017-02-19 PROCEDURE — 84484 ASSAY OF TROPONIN QUANT: CPT | Performed by: FAMILY MEDICINE

## 2017-02-19 PROCEDURE — 99285 EMERGENCY DEPT VISIT HI MDM: CPT | Mod: 25 | Performed by: FAMILY MEDICINE

## 2017-02-19 PROCEDURE — 93005 ELECTROCARDIOGRAM TRACING: CPT

## 2017-02-19 PROCEDURE — 99285 EMERGENCY DEPT VISIT HI MDM: CPT

## 2017-02-19 RX ORDER — ASPIRIN 81 MG/1
324 TABLET, CHEWABLE ORAL ONCE
Status: COMPLETED | OUTPATIENT
Start: 2017-02-19 | End: 2017-02-19

## 2017-02-19 RX ORDER — LIDOCAINE 40 MG/G
CREAM TOPICAL
Status: DISCONTINUED | OUTPATIENT
Start: 2017-02-19 | End: 2017-02-19 | Stop reason: HOSPADM

## 2017-02-19 RX ADMIN — ASPIRIN 81 MG 324 MG: 81 TABLET ORAL at 18:03

## 2017-02-19 ASSESSMENT — ENCOUNTER SYMPTOMS
FATIGUE: 1
DIAPHORESIS: 1
SHORTNESS OF BREATH: 0
ARTHRALGIAS: 1

## 2017-02-19 NOTE — ED AVS SNAPSHOT
[facility] Emergency Department    [address] [place] [address]    Phone:  [phone]    Fax:  [phone]                                       Cecil Vasquez   MRN: [id number]    Department:  [facility] Emergency [facility]   Date of Visit:  2/19/2017           Patient Information     Date Of Birth          1970        Your diagnoses for this visit were:     Atypical chest pain     Essential hypertension with goal blood pressure less than 140/90        You were seen by Georgi Hinton MD.      Follow-up Information     Schedule an appointment as soon as possible for a visit with Gonzalo Cole MD.    Specialty:  Family Practice    Why:  As needed    Contact information:    [facility]  [address] [place] [id number] [phone]          Discharge Instructions       Cecil    The labs and imaging studies that we have done today are unremarkable.  I spoke with Dr. Parra about her case.  He is a neurologist with the [facility].  He thinks it is unlikely that you are having seizures but does think that an outpatient MRI would be a good idea.  We are trying to get used that up for an MRI of the brain on Tuesday, around the same time that you have a stress test.  This would allow you to talk to Dr. Cole next week about both test results.    I think it's reasonable to stop the Ativan, as we talked about.    I wish you all the best.  Thank you for choosing our Emergency Department for your care.     Sincerely,    Dr Corby Hinton M.D.      Future Appointments        Provider Department Dept Phone Center    2/21/2017  9:00 AM [facility] [phone] [facility]    2/22/2017 8:00 AM [facility] MRI ROOM 1 [facility] [phone] [facility]    2/28/2017 9:20 AM Gonzalo Cole MD [facility] [phone] [facility]      24 Hour  Appointment Hotline       To make an appointment at any [facility], call [phone] ([phone]). If you don't have a family doctor or clinic, we will help you find one. [facility] are conveniently located to serve the needs of you and your family.          ED Discharge Orders     MRI Brain w & w/o contrast       Administration of IV contrast (contrast agent, dose, and amount) will be tailored to this examination per the appropriate written protocol listed in the Protocol E-Book, or by the supervising imaging provider.                     Review of your medicines      Our records show that you are taking the medicines listed below. If these are incorrect, please call your family doctor or clinic.        Dose / Directions Last dose taken    acetaminophen 325 MG tablet   Commonly known as:  TYLENOL   Dose:  650 mg   Quantity:  100 tablet        Take 2 tablets (650 mg) by mouth every 4 hours as needed for mild pain   Refills:  11        albuterol 108 (90 BASE) MCG/ACT Inhaler   Commonly known as:  albuterol   Dose:  2 puff   Quantity:  1 Inhaler        Inhale 2 puffs into the lungs every 4 hours as needed for shortness of breath / dyspnea   Refills:  0        aspirin 81 MG EC tablet   Dose:  81 mg   Quantity:  90 tablet        Take 1 tablet (81 mg) by mouth daily   Refills:  3        gabapentin 800 MG tablet   Commonly known as:  NEURONTIN   Quantity:  210 tablet        Take 800mg am and lunchtime, then 1200mg at night   Refills:  1        ipratropium - albuterol 0.5 mg/2.5 mg/3 mL 0.5-2.5 (3) MG/3ML neb solution   Commonly known as:  DUONEB   Dose:  1 vial   Quantity:  30 vial        Take 1 vial (3 mLs) by nebulization every 4 hours as needed for shortness of breath / dyspnea   Refills:  0        LORazepam 0.5 MG tablet   Commonly known as:  ATIVAN   Dose:  0.25-0.5 mg   Quantity:  20 tablet        Take 0.5-1 tablets (0.25-0.5 mg) by mouth every 8 hours as needed for anxiety   Refills:  0         "melatonin 3 MG tablet   Dose:  3 mg   Quantity:  30 tablet        Take 1 tablet (3 mg) by mouth nightly as needed for sleep   Refills:  1        oxyCODONE 5 MG IR tablet   Commonly known as:  ROXICODONE   Dose:  5-10 mg   Quantity:  30 tablet        Take 1-2 tablets (5-10 mg) by mouth every 6 hours as needed for moderate to severe pain 30 day supply   Refills:  0        traMADol 50 MG tablet   Commonly known as:  ULTRAM   Dose:   mg   Quantity:  120 tablet        Take 1-2 tablets ( mg) by mouth every 6 hours as needed for moderate pain   Refills:  0                Procedures and tests performed during your visit     Basic metabolic panel    CBC with platelets differential    Cardiac Continuous Monitoring    EKG 12-lead, tracing only    Peripheral IV: Standard    Pulse oximetry nursing    Troponin I    XR Chest Port 1 View      Orders Needing Specimen Collection     None      Pending Results     No orders found from 2/17/2017 to 2/20/2017.            Pending Culture Results     No orders found from 2/17/2017 to 2/20/2017.            Thank you for choosing [facility]       Thank you for choosing [facility] for your care. Our goal is always to provide you with excellent care. Hearing back from our patients is one way we can continue to improve our services. Please take a few minutes to complete the written survey that you may receive in the mail after you visit with us. Thank you!        [employer] Information     [employer] lets you send messages to your doctor, view your test results, renew your prescriptions, schedule appointments and more. To sign up, go to [email or web address] . Click on \"Log in\" on the left side of the screen, which will take you to the Welcome page. Then click on \"Sign up Now\" on the right side of the page.     You will be asked to enter the access code listed below, as well as some personal information. Please follow the directions to create your username and password.     Your access " code is: [email or web address]  Expires: 2017  8:49 PM     Your access code will  in 90 days. If you need help or a new code, please call your [facility] clinic or [phone].        Care EveryWhere ID     This is your Care EveryWhere ID. This could be used by other organizations to access your [facility] medical records  [id number]        After Visit Summary       This is your record. Keep this with you and show to your community pharmacist(s) and doctor(s) at your next visit.

## 2017-02-19 NOTE — ED NOTES
"Patient c/o cp that started when he woke at 0800. \"I've had 5 episodes today. This can't be anxiety. I feel like I'm going to die\". Episodes last about 10 minutes. He also gets teeth pain, shoulder pain, dizziness, along with fear and panic.   "

## 2017-02-19 NOTE — ED PROVIDER NOTES
"  History     Chief Complaint   Patient presents with     Chest Pain     The history is provided by the patient.     Cecil Vasquez is a 46 year old male who presents to the emergency department with chest pain. He states that three weeks ago while lifting boxes at work he developed intense chest pain. 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 Since then he has had increased frequency of these episodes and today he experienced 5-6 episodes.  After the attacks he becomes fatigued, with dental pain and shoulder pain. He states initially he noticed these episodes on exertion, however, he now experiences them at random and suddenly. Patient denies radiation of pain to arm or back, shortness of breath but notes he becomes diaphoretic during these \"attacks\" and blurred vision over the last three weeks. On Thursday, while at work, he told his boss that he needed to go to hospital because he was having another chest pain episode. He came to the ED and an entire cardiac work up was done and everything was normal. He states he went home on Ativan because the provider believed he was having panic attacks. He continued having this chest pain and he took an Ativan which he reports does not alleviate his symptoms and only makes him feel \"weird\" like when he drank in the past. His most recent episode occurred at [facility] around 1730 today. He began feeling a squeezing/pressure sensation in his middle/right side of chest but not like an \"elephant is sitting on me\". He reports that people around him wanted to call the ambulance but he pulled a chair out and he sat down in the middle of the [facility] aisle and waited for the pain to pass. Patient states he remembers the entire episode and was able to talk and was alert and oriented. Patient says he has to let \"it run its course\" and take deep breaths to clear the symptoms. He says he does \"mind over matter\" and tells himself to calm down. Patient notes that he " "took his rescue inhaler yesterday throughout day and this alleviated symptoms yesterday, however, the inhaler did not clear this chest pain today. He reports that most days he is able to walk and drive but with these symptoms he is not. Patient states that during these episodes his roommate has asked him if he was having a stroke. Patient denies history of seizures. He reports family history of multiple chronic health issues and issues with alcohol and drug use. He denies personal use of cigarettes, alcohol, or illicit drug use. Patient reports that he has seen his PCP for this issues and he has scheduled him for a stress test on Tuesday, 2/21, and has given him Nitro and aspirin. Over the last three weeks, the patient quit all of his medications except the aspirin and hypertension medication. Patient notes a history of trigeminal neuralgia.    Nurse Note:  Patient c/o cp that started when he woke at 0800. \"I've had 5 episodes today. This can't be anxiety. I feel like I'm going to die\". Episodes last about 10 minutes. He also gets teeth pain, shoulder pain, dizziness, along with fear and panic.    I have reviewed the Medications, Allergies, Past Medical and Surgical History, and Social History in the Epic system.    Patient Active Problem List   Diagnosis     Bipolar disorder (H)     Intermittent asthma     Hyperlipidemia LDL goal <160     Impaired fasting glucose     Attention deficit hyperactivity disorder (ADHD)     Depression, major, recurrent, in remission (H)     Obesity     Rosacea     Head and face pain     Renal calcification     Nephrolithiasis     Essential hypertension with goal blood pressure less than 140/90     Neuropathy (H)     Past Medical History   Diagnosis Date     ADHD (attention deficit hyperactivity disorder)      Bipolar 1 disorder (H)      Depression      Gastro-oesophageal reflux disease      Hyperlipidaemia      Hypertension      Impaired fasting glucose      Nephrolithiasis      Obesity  "     Reactive airway disease      uses albuterol inhaler when has cold     Renal calcification      Rosacea      Tobacco abuse      Trigeminal neuralgia      Uncomplicated asthma        Past Surgical History   Procedure Laterality Date     Tonsillectomy       Genitourinary surgery  3 25 2016      Lithotripsy and stent placement     Extracorporeal shock wave lithotripsy (eswl) Left 3/25/2016     Procedure: EXTRACORPOREAL SHOCK WAVE LITHOTRIPSY (ESWL);  Surgeon: Jorden Villafana MD;  Location:  OR     Combined cystoscopy, insert catheter ureter Left 3/25/2016     Procedure: COMBINED CYSTOSCOPY, INSERT CATHETER URETER;  Surgeon: Jorden Villafana MD;  Location:  OR     Extracorporeal shock wave lithotripsy, cystoscopy, insert stent ureter(s), combined Left 3/25/2016     Procedure: COMBINED EXTRACORPOREAL SHOCK WAVE LITHOTRIPSY, CYSTOSCOPY, INSERT STENT URETER(S);  Surgeon: Jorden Villafana MD;  Location:  OR     Combined cystoscopy, retrogrades, ureteroscopy, laser holmium lithotripsy ureter(s), insert stent Left 4/13/2016     Procedure: COMBINED CYSTOSCOPY, RETROGRADES, URETEROSCOPY, LASER HOLMIUM LITHOTRIPSY URETER(S), INSERT STENT;  Surgeon: Jorden Villafana MD;  Location:  OR       Family History   Problem Relation Age of Onset     Psychotic Disorder Mother      DIABETES Sister      Psychotic Disorder Sister        Social History   Substance Use Topics     Smoking status: Former Smoker     Packs/day: 0.50     Types: Cigarettes     Smokeless tobacco: Never Used      Comment: 1 pk a week     Alcohol use No      Comment: WEEKLY        Immunization History   Administered Date(s) Administered     Influenza (IIV3) 11/15/2014     Influenza Vaccine IM 3yrs+ 4 Valent IIV4 11/19/2015     Tdap (Adacel,Boostrix) 05/29/2013          Allergies   Allergen Reactions     No Known Drug Allergies        Current Outpatient Prescriptions   Medication Sig Dispense Refill     LORazepam (ATIVAN) 0.5 MG tablet  Take 0.5-1 tablets (0.25-0.5 mg) by mouth every 8 hours as needed for anxiety 20 tablet 0     gabapentin (NEURONTIN) 800 MG tablet Take 800mg am and lunchtime, then 1200mg at night 210 tablet 1     aspirin 81 MG EC tablet Take 1 tablet (81 mg) by mouth daily 90 tablet 3     oxyCODONE (ROXICODONE) 5 MG IR tablet Take 1-2 tablets (5-10 mg) by mouth every 6 hours as needed for moderate to severe pain 30 day supply 30 tablet 0     albuterol (ALBUTEROL) 108 (90 BASE) MCG/ACT Inhaler Inhale 2 puffs into the lungs every 4 hours as needed for shortness of breath / dyspnea 1 Inhaler 0     traMADol (ULTRAM) 50 MG tablet Take 1-2 tablets ( mg) by mouth every 6 hours as needed for moderate pain 120 tablet 0     ipratropium - albuterol 0.5 mg/2.5 mg/3 mL (DUONEB) 0.5-2.5 (3) MG/3ML neb solution Take 1 vial (3 mLs) by nebulization every 4 hours as needed for shortness of breath / dyspnea 30 vial 0     acetaminophen (TYLENOL) 325 MG tablet Take 2 tablets (650 mg) by mouth every 4 hours as needed for mild pain 100 tablet 11     melatonin 3 MG tablet Take 1 tablet (3 mg) by mouth nightly as needed for sleep 30 tablet 1       Review of Systems   Constitutional: Positive for diaphoresis and fatigue. Negative for chills and fever.   HENT: Positive for dental problem (dental pain).    Eyes: Positive for visual disturbance.   Respiratory: Negative for cough, shortness of breath and stridor.    Cardiovascular: Positive for chest pain. Negative for palpitations.   Gastrointestinal: Negative for abdominal pain, nausea and vomiting.   Musculoskeletal: Positive for arthralgias (shoulder pain), back pain, myalgias, neck pain and neck stiffness. Negative for gait problem and joint swelling.   Skin: Negative for rash and wound.   Neurological: Positive for tremors and weakness. Negative for dizziness, syncope, light-headedness and headaches.   All other systems reviewed and are negative.      Physical Exam      Physical Exam    Constitutional: He is oriented to person, place, and time. He appears well-developed and well-nourished.   HENT:   Head: Normocephalic and atraumatic.   Right Ear: External ear normal.   Left Ear: External ear normal.   Nose: Nose normal.   Mouth/Throat: Oropharynx is clear and moist.   Right and left TM normal   Eyes: Conjunctivae and EOM are normal. Pupils are equal, round, and reactive to light.   normal peripheral vision   Neck: Normal range of motion. Neck supple. No JVD present. No thyromegaly present.   Cardiovascular: Normal rate, regular rhythm, normal heart sounds and intact distal pulses.    No murmur heard.  Pulmonary/Chest: Effort normal and breath sounds normal. No respiratory distress. He has no wheezes. He has no rales.   Abdominal: Soft. Bowel sounds are normal. He exhibits no distension. There is no tenderness.   Musculoskeletal: Normal range of motion. He exhibits no edema, tenderness or deformity.   No pain with ROM testing of both arms and legs  Normal upper and lower extremity strength   Lymphadenopathy:     He has no cervical adenopathy.   Neurological: He is alert and oriented to person, place, and time. No cranial nerve deficit. Coordination normal.   Skin: Skin is warm and dry. No rash noted.   Psychiatric: He has a normal mood and affect. His behavior is normal.   Nursing note and vitals reviewed.      ED Course     ED Course     Procedures             EKG Interpretation:      Interpreted by Georgi Hinton MD  Time reviewed: 1830  Symptoms at time of EKG: chest pain, diaphoresis    Rhythm: sinus tachycardia  Rate: 100-110  Axis: normal   Ectopy: none  Conduction: normal  ST Segments/ T Waves: no abnormality noted  Q Waves:  None  Comparison to prior: Unchanged from 2/1  Compariso3/17    Clinical Impression: sinus tachycardia             Results for orders placed or performed during the hospital encounter of 02/19/17 (from the past 24 hour(s))   CBC with platelets differential  "  Result Value Ref Range    WBC 8.1 4.0 - 11.0 10e9/L    RBC Count 4.86 4.4 - 5.9 10e12/L    Hemoglobin 14.0 13.3 - 17.7 g/dL    Hematocrit 40.9 40.0 - 53.0 %    MCV 84 78 - 100 fl    MCH 28.8 26.5 - 33.0 pg    MCHC 34.2 31.5 - 36.5 g/dL    RDW 13.9 10.0 - 15.0 %    Platelet Count 218 150 - 450 10e9/L    Diff Method Automated Method     % Neutrophils 68.7 %    % Lymphocytes 21.5 %    % Monocytes 7.2 %    % Eosinophils 0.9 %    % Basophils 0.2 %    % Immature Granulocytes 1.5 %    Absolute Neutrophil 5.5 1.6 - 8.3 10e9/L    Absolute Lymphocytes 1.7 0.8 - 5.3 10e9/L    Absolute Monocytes 0.6 0.0 - 1.3 10e9/L    Absolute Eosinophils 0.1 0.0 - 0.7 10e9/L    Absolute Basophils 0.0 0.0 - 0.2 10e9/L    Abs Immature Granulocytes 0.1 0 - 0.4 10e9/L   Basic metabolic panel   Result Value Ref Range    Sodium 143 133 - 144 mmol/L    Potassium 4.0 3.4 - 5.3 mmol/L    Chloride 107 94 - 109 mmol/L    Carbon Dioxide 24 20 - 32 mmol/L    Anion Gap 12 3 - 14 mmol/L    Glucose 138 (H) 70 - 99 mg/dL    Urea Nitrogen 16 7 - 30 mg/dL    Creatinine 0.92 0.66 - 1.25 mg/dL    GFR Estimate 87 >60 mL/min/1.7m2    GFR Estimate If Black >90   GFR Calc   >60 mL/min/1.7m2    Calcium 8.8 8.5 - 10.1 mg/dL   Troponin I   Result Value Ref Range    Troponin I ES 0.036 0.000 - 0.045 ug/L   XR Chest Port 1 View    Narrative    CHEST ONE VIEW PORTABLE   2/19/2017 7:02 PM     HISTORY: Chest pain    COMPARISON: 2/13/2017      Impression    IMPRESSION: Normal. No change.    ERENDIRA BARTHOLOMEW MD     Medications   aspirin chewable tablet 324 mg (324 mg Oral Given 2/19/17 1803)     Assessments & Plan (with Medical Decision Making)  Cecil is here with concerns about episodes that he has been having now for about three weeks. He describes them as feeling a squeezing/pressure sensation in his middle/right side of chest but not like an \"elephant is sitting on me\". He reports that people around him wanted to call the ambulance but he waited for the " "pain to pass. Patient states he remembers the entire episode and was able to talk and was alert and oriented. Patient says he has to let \"it run its course\" and take deep breaths to clear the symptoms. He says he does \"mind over matter\" and tells himself to calm down. He was seen in the ED here on Thursday and had a complete cardiac workup at that time. His exam here is normal today. His labs included a CBC, BMP and troponin and those were again normal. His EKG was again normal.  I wonder if he is having esophageal spasm, and recommend that he follow up with his physician, Dr Cole.   I will send Dr Cole a note regarding this visit and my thoughts regarding esophageal spasm.     I have reviewed the nursing notes.    I have reviewed the findings, diagnosis, plan and need for follow up with the patient.    Discharge Medication List as of 2/19/2017  7:33 PM          Final diagnoses:   Atypical chest pain   Essential hypertension with goal blood pressure less than 140/90       This document serves as a record of services personally performed by Georgi Hinton MD. It was created on their behalf by Anita Alvarez, a trained medical scribe. The creation of this record is based on the provider's personal observations and the statements of the patient. This document has been checked and approved by the attending provider.     Note: Chart documentation done in part with Dragon Voice Recognition software. Although reviewed after completion, some word and grammatical errors may remain.    2/19/2017   [facility] EMERGENCY DEPARTMENT     Georgi Hinton MD  02/20/17 1059    "

## 2017-02-19 NOTE — ED AVS SNAPSHOT
[facility] Emergency Department    [address] [place] [address]    Phone:  [phone]    Fax:  [phone]                                       Cecil Vasquez   MRN: [id number]    Department:  [facility] Emergency Department   Date of Visit:  2/19/2017           After Visit Summary Signature Page     I have received my discharge instructions, and my questions have been answered. I have discussed any challenges I see with this plan with the nurse or doctor.    ..........................................................................................................................................  Patient/Patient Representative Signature      ..........................................................................................................................................  Patient Representative Print Name and Relationship to Patient    ..................................................               ................................................  Date                                            Time    ..........................................................................................................................................  Reviewed by Signature/Title    ...................................................              ..............................................  Date                                                            Time

## 2017-02-20 DIAGNOSIS — G62.9 NEUROPATHY: ICD-10-CM

## 2017-02-20 DIAGNOSIS — J45.901 ASTHMA WITH ACUTE EXACERBATION, UNSPECIFIED ASTHMA SEVERITY: ICD-10-CM

## 2017-02-20 RX ORDER — TRAMADOL HYDROCHLORIDE 50 MG/1
50-100 TABLET ORAL EVERY 6 HOURS PRN
Qty: 120 TABLET | Refills: 0 | Status: CANCELLED | OUTPATIENT
Start: 2017-02-20

## 2017-02-20 ASSESSMENT — ENCOUNTER SYMPTOMS
ABDOMINAL PAIN: 0
DIZZINESS: 0
STRIDOR: 0
LIGHT-HEADEDNESS: 0
NECK STIFFNESS: 1
FEVER: 0
BACK PAIN: 1
PALPITATIONS: 0
WOUND: 0
TREMORS: 1
COUGH: 0
JOINT SWELLING: 0
NECK PAIN: 1
CHILLS: 0
MYALGIAS: 1
WEAKNESS: 1
NAUSEA: 0
VOMITING: 0
HEADACHES: 0

## 2017-02-20 NOTE — DISCHARGE INSTRUCTIONS
Cecil    The labs and imaging studies that we have done today are unremarkable.  I spoke with Dr. Parra about her case.  He is a neurologist with the [facility].  He thinks it is unlikely that you are having seizures but does think that an outpatient MRI would be a good idea.  We are trying to get used that up for an MRI of the brain on Tuesday, around the same time that you have a stress test.  This would allow you to talk to Dr. Cole next week about both test results.    I think it's reasonable to stop the Ativan, as we talked about.    I wish you all the best.  Thank you for choosing our Emergency Department for your care.     Sincerely,    Dr Corby Hinton M.D.

## 2017-02-20 NOTE — TELEPHONE ENCOUNTER
Tramadol      Last Written Prescription Date: 1/19/17   Last Fill Quantity: 120,  # refills: 0   Last Office Visit with [facility] or [facility] prescribing provider: 2/3/17                                         Next 5 appointments (look out 90 days)     Feb 28, 2017  9:20 AM CST   Office Visit with Gonzalo Cole MD   [facility]    [address] [place] [id number] [phone]

## 2017-02-21 ENCOUNTER — APPOINTMENT (OUTPATIENT)
Dept: CARDIOLOGY | Facility: CLINIC | Age: 47
DRG: 247 | End: 2017-02-21
Attending: INTERNAL MEDICINE
Payer: COMMERCIAL

## 2017-02-21 ENCOUNTER — HOSPITAL ENCOUNTER (OUTPATIENT)
Dept: CARDIOLOGY | Facility: CLINIC | Age: 47
Discharge: HOME | End: 2017-02-21
Attending: INTERNAL MEDICINE | Admitting: INTERNAL MEDICINE
Payer: COMMERCIAL

## 2017-02-21 ENCOUNTER — HOSPITAL ENCOUNTER (INPATIENT)
Facility: CLINIC | Age: 47
LOS: 1 days | Discharge: HOME | DRG: 247 | End: 2017-02-22
Attending: HOSPITALIST | Admitting: HOSPITALIST
Payer: COMMERCIAL

## 2017-02-21 ENCOUNTER — HOSPITAL ENCOUNTER (EMERGENCY)
Facility: CLINIC | Age: 47
Discharge: ANOTHER HOSPITAL | End: 2017-02-21
Attending: FAMILY MEDICINE | Admitting: FAMILY MEDICINE
Payer: COMMERCIAL

## 2017-02-21 ENCOUNTER — HOSPITAL ENCOUNTER (OUTPATIENT)
Dept: CARDIOLOGY | Facility: CLINIC | Age: 47
End: 2017-02-21
Attending: FAMILY MEDICINE
Payer: COMMERCIAL

## 2017-02-21 VITALS
HEART RATE: 101 BPM | DIASTOLIC BLOOD PRESSURE: 91 MMHG | SYSTOLIC BLOOD PRESSURE: 129 MMHG | TEMPERATURE: 98.4 F | BODY MASS INDEX: 38.51 KG/M2 | WEIGHT: 260 LBS | OXYGEN SATURATION: 96 % | RESPIRATION RATE: 16 BRPM | HEIGHT: 69 IN

## 2017-02-21 DIAGNOSIS — I25.110 CORONARY ARTERY DISEASE INVOLVING NATIVE CORONARY ARTERY OF NATIVE HEART WITH UNSTABLE ANGINA PECTORIS (H): ICD-10-CM

## 2017-02-21 DIAGNOSIS — I24.9 ACUTE CORONARY SYNDROME (H): ICD-10-CM

## 2017-02-21 DIAGNOSIS — G62.9 NEUROPATHY: ICD-10-CM

## 2017-02-21 DIAGNOSIS — I10 ESSENTIAL HYPERTENSION WITH GOAL BLOOD PRESSURE LESS THAN 140/90: ICD-10-CM

## 2017-02-21 DIAGNOSIS — R07.9 ACUTE CHEST PAIN: Primary | ICD-10-CM

## 2017-02-21 DIAGNOSIS — R07.9 ACUTE CHEST PAIN: ICD-10-CM

## 2017-02-21 DIAGNOSIS — I24.9 ACS (ACUTE CORONARY SYNDROME) (H): Primary | ICD-10-CM

## 2017-02-21 LAB
ANION GAP SERPL CALCULATED.3IONS-SCNC: 13 MMOL/L (ref 3–14)
BASOPHILS # BLD AUTO: 0 10E9/L (ref 0–0.2)
BASOPHILS NFR BLD AUTO: 0.2 %
BUN SERPL-MCNC: 21 MG/DL (ref 7–30)
CALCIUM SERPL-MCNC: 8.9 MG/DL (ref 8.5–10.1)
CHLORIDE SERPL-SCNC: 104 MMOL/L (ref 94–109)
CO2 SERPL-SCNC: 22 MMOL/L (ref 20–32)
CREAT SERPL-MCNC: 0.81 MG/DL (ref 0.66–1.25)
DIFFERENTIAL METHOD BLD: NORMAL
EOSINOPHIL # BLD AUTO: 0.1 10E9/L (ref 0–0.7)
EOSINOPHIL NFR BLD AUTO: 1.1 %
ERYTHROCYTE [DISTWIDTH] IN BLOOD BY AUTOMATED COUNT: 13.5 % (ref 10–15)
ERYTHROCYTE [DISTWIDTH] IN BLOOD BY AUTOMATED COUNT: 13.6 % (ref 10–15)
GFR SERPL CREATININE-BSD FRML MDRD: ABNORMAL ML/MIN/1.7M2
GLUCOSE SERPL-MCNC: 129 MG/DL (ref 70–99)
HCT VFR BLD AUTO: 41.8 % (ref 40–53)
HCT VFR BLD AUTO: 43.9 % (ref 40–53)
HGB BLD-MCNC: 14.1 G/DL (ref 13.3–17.7)
HGB BLD-MCNC: 15.4 G/DL (ref 13.3–17.7)
IMM GRANULOCYTES # BLD: 0.1 10E9/L (ref 0–0.4)
IMM GRANULOCYTES NFR BLD: 1.4 %
KCT BLD-ACNC: 207 SEC (ref 105–167)
KCT BLD-ACNC: 252 SEC (ref 105–167)
KCT BLD-ACNC: 281 SEC (ref 105–167)
LYMPHOCYTES # BLD AUTO: 1.7 10E9/L (ref 0.8–5.3)
LYMPHOCYTES NFR BLD AUTO: 18.2 %
MCH RBC QN AUTO: 28.4 PG (ref 26.5–33)
MCH RBC QN AUTO: 29.3 PG (ref 26.5–33)
MCHC RBC AUTO-ENTMCNC: 33.7 G/DL (ref 31.5–36.5)
MCHC RBC AUTO-ENTMCNC: 35.1 G/DL (ref 31.5–36.5)
MCV RBC AUTO: 84 FL (ref 78–100)
MCV RBC AUTO: 84 FL (ref 78–100)
MONOCYTES # BLD AUTO: 0.7 10E9/L (ref 0–1.3)
MONOCYTES NFR BLD AUTO: 7 %
NEUTROPHILS # BLD AUTO: 6.7 10E9/L (ref 1.6–8.3)
NEUTROPHILS NFR BLD AUTO: 72.1 %
PLATELET # BLD AUTO: 223 10E9/L (ref 150–450)
PLATELET # BLD AUTO: 227 10E9/L (ref 150–450)
POTASSIUM SERPL-SCNC: 4.5 MMOL/L (ref 3.4–5.3)
RBC # BLD AUTO: 4.97 10E12/L (ref 4.4–5.9)
RBC # BLD AUTO: 5.26 10E12/L (ref 4.4–5.9)
SODIUM SERPL-SCNC: 139 MMOL/L (ref 133–144)
TROPONIN I SERPL-MCNC: 0.03 UG/L (ref 0–0.04)
TROPONIN I SERPL-MCNC: 0.07 UG/L (ref 0–0.04)
TROPONIN I SERPL-MCNC: 0.07 UG/L (ref 0–0.04)
WBC # BLD AUTO: 10.1 10E9/L (ref 4–11)
WBC # BLD AUTO: 9.2 10E9/L (ref 4–11)

## 2017-02-21 PROCEDURE — C1769 GUIDE WIRE: HCPCS

## 2017-02-21 PROCEDURE — 027034Z DILATION OF CORONARY ARTERY, ONE ARTERY WITH DRUG-ELUTING INTRALUMINAL DEVICE, PERCUTANEOUS APPROACH: ICD-10-PCS | Performed by: INTERNAL MEDICINE

## 2017-02-21 PROCEDURE — B2111ZZ FLUOROSCOPY OF MULTIPLE CORONARY ARTERIES USING LOW OSMOLAR CONTRAST: ICD-10-PCS | Performed by: INTERNAL MEDICINE

## 2017-02-21 PROCEDURE — C1725 CATH, TRANSLUMIN NON-LASER: HCPCS

## 2017-02-21 PROCEDURE — 80048 BASIC METABOLIC PNL TOTAL CA: CPT | Performed by: FAMILY MEDICINE

## 2017-02-21 PROCEDURE — C1874 STENT, COATED/COV W/DEL SYS: HCPCS

## 2017-02-21 PROCEDURE — 93018 CV STRESS TEST I&R ONLY: CPT | Performed by: INTERNAL MEDICINE

## 2017-02-21 PROCEDURE — 25000125 ZZHC RX 250: Performed by: INTERNAL MEDICINE

## 2017-02-21 PROCEDURE — 99153 MOD SED SAME PHYS/QHP EA: CPT | Performed by: INTERNAL MEDICINE

## 2017-02-21 PROCEDURE — 25000128 H RX IP 250 OP 636: Performed by: FAMILY MEDICINE

## 2017-02-21 PROCEDURE — 25000128 H RX IP 250 OP 636: Performed by: INTERNAL MEDICINE

## 2017-02-21 PROCEDURE — 84484 ASSAY OF TROPONIN QUANT: CPT | Performed by: PHYSICIAN ASSISTANT

## 2017-02-21 PROCEDURE — 85347 COAGULATION TIME ACTIVATED: CPT

## 2017-02-21 PROCEDURE — 85025 COMPLETE CBC W/AUTO DIFF WBC: CPT | Performed by: FAMILY MEDICINE

## 2017-02-21 PROCEDURE — 4A023N7 MEASUREMENT OF CARDIAC SAMPLING AND PRESSURE, LEFT HEART, PERCUTANEOUS APPROACH: ICD-10-PCS | Performed by: INTERNAL MEDICINE

## 2017-02-21 PROCEDURE — 96374 THER/PROPH/DIAG INJ IV PUSH: CPT

## 2017-02-21 PROCEDURE — 27210946 ZZH KIT HC TOTES DISP CR8

## 2017-02-21 PROCEDURE — 93571 IV DOP VEL&/PRESS C FLO 1ST: CPT | Mod: 26 | Performed by: INTERNAL MEDICINE

## 2017-02-21 PROCEDURE — 27210787 ZZH MANIFOLD CR2

## 2017-02-21 PROCEDURE — 92928 PRQ TCAT PLMT NTRAC ST 1 LES: CPT | Mod: LD | Performed by: INTERNAL MEDICINE

## 2017-02-21 PROCEDURE — 99291 CRITICAL CARE FIRST HOUR: CPT | Performed by: INTERNAL MEDICINE

## 2017-02-21 PROCEDURE — 93010 ELECTROCARDIOGRAM REPORT: CPT | Performed by: INTERNAL MEDICINE

## 2017-02-21 PROCEDURE — 99152 MOD SED SAME PHYS/QHP 5/>YRS: CPT | Performed by: INTERNAL MEDICINE

## 2017-02-21 PROCEDURE — 36415 COLL VENOUS BLD VENIPUNCTURE: CPT | Performed by: PHYSICIAN ASSISTANT

## 2017-02-21 PROCEDURE — 93325 DOPPLER ECHO COLOR FLOW MAPG: CPT | Mod: 26 | Performed by: INTERNAL MEDICINE

## 2017-02-21 PROCEDURE — 27210856 ZZH ACCESS HEART CATH CR2

## 2017-02-21 PROCEDURE — 27210998 ZZH ACCESS HEART CATH CR6

## 2017-02-21 PROCEDURE — 25000132 ZZH RX MED GY IP 250 OP 250 PS 637: Performed by: PHYSICIAN ASSISTANT

## 2017-02-21 PROCEDURE — 93005 ELECTROCARDIOGRAM TRACING: CPT

## 2017-02-21 PROCEDURE — C1887 CATHETER, GUIDING: HCPCS

## 2017-02-21 PROCEDURE — 25000132 ZZH RX MED GY IP 250 OP 250 PS 637: Performed by: INTERNAL MEDICINE

## 2017-02-21 PROCEDURE — 27211089 ZZH KIT ACIST INJECTOR CR3

## 2017-02-21 PROCEDURE — 27210914 ZZH SHEATH CR8

## 2017-02-21 PROCEDURE — 99153 MOD SED SAME PHYS/QHP EA: CPT

## 2017-02-21 PROCEDURE — 27210759 ZZH DEVICE INFLATION CR6

## 2017-02-21 PROCEDURE — 99152 MOD SED SAME PHYS/QHP 5/>YRS: CPT

## 2017-02-21 PROCEDURE — 93350 STRESS TTE ONLY: CPT | Mod: 26 | Performed by: INTERNAL MEDICINE

## 2017-02-21 PROCEDURE — C9600 PERC DRUG-EL COR STENT SING: HCPCS

## 2017-02-21 PROCEDURE — 99283 EMERGENCY DEPT VISIT LOW MDM: CPT | Performed by: FAMILY MEDICINE

## 2017-02-21 PROCEDURE — 21000000 ZZH R&B IMCU HEART CARE

## 2017-02-21 PROCEDURE — 84484 ASSAY OF TROPONIN QUANT: CPT | Performed by: FAMILY MEDICINE

## 2017-02-21 PROCEDURE — 99223 1ST HOSP IP/OBS HIGH 75: CPT | Mod: AI | Performed by: PHYSICIAN ASSISTANT

## 2017-02-21 PROCEDURE — 93321 DOPPLER ECHO F-UP/LMTD STD: CPT | Mod: 26 | Performed by: INTERNAL MEDICINE

## 2017-02-21 PROCEDURE — 25000132 ZZH RX MED GY IP 250 OP 250 PS 637: Performed by: HOSPITALIST

## 2017-02-21 PROCEDURE — 93571 IV DOP VEL&/PRESS C FLO 1ST: CPT

## 2017-02-21 PROCEDURE — 93458 L HRT ARTERY/VENTRICLE ANGIO: CPT | Mod: 26 | Performed by: INTERNAL MEDICINE

## 2017-02-21 PROCEDURE — 25000132 ZZH RX MED GY IP 250 OP 250 PS 637: Performed by: FAMILY MEDICINE

## 2017-02-21 PROCEDURE — 99285 EMERGENCY DEPT VISIT HI MDM: CPT | Mod: 25

## 2017-02-21 PROCEDURE — 85027 COMPLETE CBC AUTOMATED: CPT | Performed by: PHYSICIAN ASSISTANT

## 2017-02-21 PROCEDURE — 27210795 ZZH PAD DEFIB QUICK CR4

## 2017-02-21 PROCEDURE — 4A033BC MEASUREMENT OF ARTERIAL PRESSURE, CORONARY, PERCUTANEOUS APPROACH: ICD-10-PCS | Performed by: INTERNAL MEDICINE

## 2017-02-21 PROCEDURE — 93458 L HRT ARTERY/VENTRICLE ANGIO: CPT

## 2017-02-21 PROCEDURE — 96375 TX/PRO/DX INJ NEW DRUG ADDON: CPT

## 2017-02-21 PROCEDURE — 93016 CV STRESS TEST SUPVJ ONLY: CPT | Performed by: INTERNAL MEDICINE

## 2017-02-21 RX ORDER — CLOPIDOGREL BISULFATE 75 MG/1
75 TABLET ORAL
Status: DISCONTINUED | OUTPATIENT
Start: 2017-02-21 | End: 2017-02-21 | Stop reason: HOSPADM

## 2017-02-21 RX ORDER — SODIUM CHLORIDE 9 MG/ML
INJECTION, SOLUTION INTRAVENOUS CONTINUOUS
Status: ACTIVE | OUTPATIENT
Start: 2017-02-21 | End: 2017-02-21

## 2017-02-21 RX ORDER — SODIUM CHLORIDE 9 MG/ML
INJECTION, SOLUTION INTRAVENOUS CONTINUOUS
Status: DISCONTINUED | OUTPATIENT
Start: 2017-02-21 | End: 2017-02-21

## 2017-02-21 RX ORDER — NITROGLYCERIN 20 MG/100ML
.07-1.8 INJECTION INTRAVENOUS CONTINUOUS PRN
Status: DISCONTINUED | OUTPATIENT
Start: 2017-02-21 | End: 2017-02-21 | Stop reason: HOSPADM

## 2017-02-21 RX ORDER — DOBUTAMINE HYDROCHLORIDE 200 MG/100ML
2-20 INJECTION INTRAVENOUS CONTINUOUS PRN
Status: DISCONTINUED | OUTPATIENT
Start: 2017-02-21 | End: 2017-02-21 | Stop reason: HOSPADM

## 2017-02-21 RX ORDER — ONDANSETRON 2 MG/ML
4 INJECTION INTRAMUSCULAR; INTRAVENOUS EVERY 6 HOURS PRN
Status: DISCONTINUED | OUTPATIENT
Start: 2017-02-21 | End: 2017-02-22 | Stop reason: HOSPADM

## 2017-02-21 RX ORDER — ADENOSINE 3 MG/ML
12-12000 INJECTION, SOLUTION INTRAVENOUS
Status: DISCONTINUED | OUTPATIENT
Start: 2017-02-21 | End: 2017-02-21 | Stop reason: HOSPADM

## 2017-02-21 RX ORDER — GABAPENTIN 400 MG/1
800 CAPSULE ORAL 2 TIMES DAILY
Status: DISCONTINUED | OUTPATIENT
Start: 2017-02-21 | End: 2017-02-22 | Stop reason: HOSPADM

## 2017-02-21 RX ORDER — PROCHLORPERAZINE MALEATE 5 MG
5-10 TABLET ORAL EVERY 6 HOURS PRN
Status: DISCONTINUED | OUTPATIENT
Start: 2017-02-21 | End: 2017-02-22 | Stop reason: HOSPADM

## 2017-02-21 RX ORDER — SODIUM CHLORIDE 9 MG/ML
INJECTION, SOLUTION INTRAVENOUS CONTINUOUS
Status: DISCONTINUED | OUTPATIENT
Start: 2017-02-21 | End: 2017-02-21 | Stop reason: HOSPADM

## 2017-02-21 RX ORDER — PROMETHAZINE HYDROCHLORIDE 25 MG/ML
6.25-25 INJECTION, SOLUTION INTRAMUSCULAR; INTRAVENOUS EVERY 4 HOURS PRN
Status: DISCONTINUED | OUTPATIENT
Start: 2017-02-21 | End: 2017-02-21 | Stop reason: HOSPADM

## 2017-02-21 RX ORDER — LISINOPRIL 2.5 MG/1
2.5 TABLET ORAL DAILY
Status: DISCONTINUED | OUTPATIENT
Start: 2017-02-22 | End: 2017-02-21

## 2017-02-21 RX ORDER — NITROGLYCERIN 20 MG/100ML
.07-1.7 INJECTION INTRAVENOUS CONTINUOUS
Status: DISCONTINUED | OUTPATIENT
Start: 2017-02-21 | End: 2017-02-21

## 2017-02-21 RX ORDER — POTASSIUM CHLORIDE 29.8 MG/ML
20 INJECTION INTRAVENOUS
Status: DISCONTINUED | OUTPATIENT
Start: 2017-02-21 | End: 2017-02-21 | Stop reason: HOSPADM

## 2017-02-21 RX ORDER — NIFEDIPINE 10 MG/1
10 CAPSULE ORAL
Status: DISCONTINUED | OUTPATIENT
Start: 2017-02-21 | End: 2017-02-21 | Stop reason: HOSPADM

## 2017-02-21 RX ORDER — DIPHENHYDRAMINE HYDROCHLORIDE 50 MG/ML
25-50 INJECTION INTRAMUSCULAR; INTRAVENOUS
Status: DISCONTINUED | OUTPATIENT
Start: 2017-02-21 | End: 2017-02-21 | Stop reason: HOSPADM

## 2017-02-21 RX ORDER — LORAZEPAM 2 MG/ML
.5-2 INJECTION INTRAMUSCULAR EVERY 4 HOURS PRN
Status: DISCONTINUED | OUTPATIENT
Start: 2017-02-21 | End: 2017-02-21 | Stop reason: HOSPADM

## 2017-02-21 RX ORDER — BISACODYL 10 MG
10 SUPPOSITORY, RECTAL RECTAL DAILY PRN
Status: DISCONTINUED | OUTPATIENT
Start: 2017-02-21 | End: 2017-02-22 | Stop reason: HOSPADM

## 2017-02-21 RX ORDER — AMOXICILLIN 250 MG
1-2 CAPSULE ORAL 2 TIMES DAILY PRN
Status: DISCONTINUED | OUTPATIENT
Start: 2017-02-21 | End: 2017-02-22 | Stop reason: HOSPADM

## 2017-02-21 RX ORDER — GABAPENTIN 600 MG/1
1200 TABLET ORAL AT BEDTIME
Status: DISCONTINUED | OUTPATIENT
Start: 2017-02-21 | End: 2017-02-22 | Stop reason: HOSPADM

## 2017-02-21 RX ORDER — DOPAMINE HYDROCHLORIDE 160 MG/100ML
2-20 INJECTION, SOLUTION INTRAVENOUS CONTINUOUS PRN
Status: DISCONTINUED | OUTPATIENT
Start: 2017-02-21 | End: 2017-02-21 | Stop reason: HOSPADM

## 2017-02-21 RX ORDER — NITROGLYCERIN 0.4 MG/1
0.4 TABLET SUBLINGUAL EVERY 5 MIN PRN
Status: DISCONTINUED | OUTPATIENT
Start: 2017-02-21 | End: 2017-02-22 | Stop reason: HOSPADM

## 2017-02-21 RX ORDER — BUPIVACAINE HYDROCHLORIDE 2.5 MG/ML
1-10 INJECTION, SOLUTION EPIDURAL; INFILTRATION; INTRACAUDAL
Status: DISCONTINUED | OUTPATIENT
Start: 2017-02-21 | End: 2017-02-21 | Stop reason: HOSPADM

## 2017-02-21 RX ORDER — LIDOCAINE HYDROCHLORIDE 10 MG/ML
1-10 INJECTION, SOLUTION EPIDURAL; INFILTRATION; INTRACAUDAL; PERINEURAL
Status: COMPLETED | OUTPATIENT
Start: 2017-02-21 | End: 2017-02-21

## 2017-02-21 RX ORDER — LISINOPRIL 10 MG/1
10 TABLET ORAL DAILY
Status: DISCONTINUED | OUTPATIENT
Start: 2017-02-21 | End: 2017-02-22 | Stop reason: HOSPADM

## 2017-02-21 RX ORDER — METHYLPREDNISOLONE SODIUM SUCCINATE 125 MG/2ML
125 INJECTION, POWDER, LYOPHILIZED, FOR SOLUTION INTRAMUSCULAR; INTRAVENOUS
Status: DISCONTINUED | OUTPATIENT
Start: 2017-02-21 | End: 2017-02-21 | Stop reason: HOSPADM

## 2017-02-21 RX ORDER — IOPAMIDOL 755 MG/ML
190 INJECTION, SOLUTION INTRAVASCULAR ONCE
Status: COMPLETED | OUTPATIENT
Start: 2017-02-21 | End: 2017-02-21

## 2017-02-21 RX ORDER — POLYETHYLENE GLYCOL 3350 17 G/17G
17 POWDER, FOR SOLUTION ORAL DAILY PRN
Status: DISCONTINUED | OUTPATIENT
Start: 2017-02-21 | End: 2017-02-22 | Stop reason: HOSPADM

## 2017-02-21 RX ORDER — ACETAMINOPHEN 325 MG/1
325-650 TABLET ORAL EVERY 4 HOURS PRN
Status: DISCONTINUED | OUTPATIENT
Start: 2017-02-21 | End: 2017-02-22 | Stop reason: HOSPADM

## 2017-02-21 RX ORDER — LIDOCAINE 40 MG/G
CREAM TOPICAL
Status: DISCONTINUED | OUTPATIENT
Start: 2017-02-21 | End: 2017-02-21 | Stop reason: HOSPADM

## 2017-02-21 RX ORDER — ATORVASTATIN CALCIUM 40 MG/1
40 TABLET, FILM COATED ORAL DAILY
Status: DISCONTINUED | OUTPATIENT
Start: 2017-02-21 | End: 2017-02-21

## 2017-02-21 RX ORDER — HYDROCODONE BITARTRATE AND ACETAMINOPHEN 5; 325 MG/1; MG/1
1-2 TABLET ORAL EVERY 4 HOURS PRN
Status: DISCONTINUED | OUTPATIENT
Start: 2017-02-21 | End: 2017-02-21

## 2017-02-21 RX ORDER — NICARDIPINE HYDROCHLORIDE 2.5 MG/ML
100 INJECTION INTRAVENOUS
Status: DISCONTINUED | OUTPATIENT
Start: 2017-02-21 | End: 2017-02-21 | Stop reason: HOSPADM

## 2017-02-21 RX ORDER — PROTAMINE SULFATE 10 MG/ML
25-100 INJECTION, SOLUTION INTRAVENOUS EVERY 5 MIN PRN
Status: DISCONTINUED | OUTPATIENT
Start: 2017-02-21 | End: 2017-02-21 | Stop reason: HOSPADM

## 2017-02-21 RX ORDER — ASPIRIN 81 MG/1
324 TABLET, CHEWABLE ORAL ONCE
Status: DISCONTINUED | OUTPATIENT
Start: 2017-02-21 | End: 2017-02-21

## 2017-02-21 RX ORDER — PHENYLEPHRINE HCL IN 0.9% NACL 1 MG/10 ML
20-100 SYRINGE (ML) INTRAVENOUS
Status: DISCONTINUED | OUTPATIENT
Start: 2017-02-21 | End: 2017-02-21 | Stop reason: HOSPADM

## 2017-02-21 RX ORDER — ALUMINA, MAGNESIA, AND SIMETHICONE 2400; 2400; 240 MG/30ML; MG/30ML; MG/30ML
15-30 SUSPENSION ORAL EVERY 4 HOURS PRN
Status: DISCONTINUED | OUTPATIENT
Start: 2017-02-21 | End: 2017-02-22 | Stop reason: HOSPADM

## 2017-02-21 RX ORDER — ASPIRIN 81 MG/1
81 TABLET ORAL DAILY
Status: DISCONTINUED | OUTPATIENT
Start: 2017-02-22 | End: 2017-02-22 | Stop reason: HOSPADM

## 2017-02-21 RX ORDER — POTASSIUM CHLORIDE 7.45 MG/ML
10 INJECTION INTRAVENOUS
Status: DISCONTINUED | OUTPATIENT
Start: 2017-02-21 | End: 2017-02-21 | Stop reason: HOSPADM

## 2017-02-21 RX ORDER — SODIUM NITROPRUSSIDE 25 MG/ML
100-200 INJECTION INTRAVENOUS
Status: DISCONTINUED | OUTPATIENT
Start: 2017-02-21 | End: 2017-02-21 | Stop reason: HOSPADM

## 2017-02-21 RX ORDER — IPRATROPIUM BROMIDE AND ALBUTEROL SULFATE 2.5; .5 MG/3ML; MG/3ML
3 SOLUTION RESPIRATORY (INHALATION)
Status: DISCONTINUED | OUTPATIENT
Start: 2017-02-21 | End: 2017-02-22 | Stop reason: HOSPADM

## 2017-02-21 RX ORDER — ROSUVASTATIN CALCIUM 20 MG/1
40 TABLET, COATED ORAL DAILY
Status: DISCONTINUED | OUTPATIENT
Start: 2017-02-21 | End: 2017-02-22 | Stop reason: HOSPADM

## 2017-02-21 RX ORDER — NITROGLYCERIN 0.4 MG/1
0.4 TABLET SUBLINGUAL EVERY 5 MIN PRN
Status: DISCONTINUED | OUTPATIENT
Start: 2017-02-21 | End: 2017-02-21 | Stop reason: HOSPADM

## 2017-02-21 RX ORDER — ALBUTEROL SULFATE 90 UG/1
2 AEROSOL, METERED RESPIRATORY (INHALATION) 4 TIMES DAILY PRN
Status: DISCONTINUED | OUTPATIENT
Start: 2017-02-21 | End: 2017-02-22 | Stop reason: HOSPADM

## 2017-02-21 RX ORDER — MORPHINE SULFATE 2 MG/ML
1-2 INJECTION, SOLUTION INTRAMUSCULAR; INTRAVENOUS EVERY 5 MIN PRN
Status: DISCONTINUED | OUTPATIENT
Start: 2017-02-21 | End: 2017-02-21 | Stop reason: HOSPADM

## 2017-02-21 RX ORDER — ACETAMINOPHEN 325 MG/1
650 TABLET ORAL EVERY 4 HOURS PRN
Status: DISCONTINUED | OUTPATIENT
Start: 2017-02-21 | End: 2017-02-21

## 2017-02-21 RX ORDER — LIDOCAINE HYDROCHLORIDE 10 MG/ML
30 INJECTION, SOLUTION EPIDURAL; INFILTRATION; INTRACAUDAL; PERINEURAL
Status: DISCONTINUED | OUTPATIENT
Start: 2017-02-21 | End: 2017-02-21 | Stop reason: HOSPADM

## 2017-02-21 RX ORDER — NALOXONE HYDROCHLORIDE 0.4 MG/ML
.1-.4 INJECTION, SOLUTION INTRAMUSCULAR; INTRAVENOUS; SUBCUTANEOUS
Status: DISCONTINUED | OUTPATIENT
Start: 2017-02-21 | End: 2017-02-21

## 2017-02-21 RX ORDER — ASPIRIN 325 MG
325 TABLET ORAL ONCE
Status: COMPLETED | OUTPATIENT
Start: 2017-02-21 | End: 2017-02-21

## 2017-02-21 RX ORDER — TRAMADOL HYDROCHLORIDE 50 MG/1
50-100 TABLET ORAL EVERY 6 HOURS PRN
Status: DISCONTINUED | OUTPATIENT
Start: 2017-02-21 | End: 2017-02-22 | Stop reason: HOSPADM

## 2017-02-21 RX ORDER — FENTANYL CITRATE 50 UG/ML
25-50 INJECTION, SOLUTION INTRAMUSCULAR; INTRAVENOUS
Status: ACTIVE | OUTPATIENT
Start: 2017-02-21 | End: 2017-02-22

## 2017-02-21 RX ORDER — NALOXONE HYDROCHLORIDE 0.4 MG/ML
.2-.4 INJECTION, SOLUTION INTRAMUSCULAR; INTRAVENOUS; SUBCUTANEOUS
Status: ACTIVE | OUTPATIENT
Start: 2017-02-21 | End: 2017-02-22

## 2017-02-21 RX ORDER — POTASSIUM CHLORIDE 1500 MG/1
20 TABLET, EXTENDED RELEASE ORAL
Status: DISCONTINUED | OUTPATIENT
Start: 2017-02-21 | End: 2017-02-21 | Stop reason: HOSPADM

## 2017-02-21 RX ORDER — DEXTROSE MONOHYDRATE 25 G/50ML
12.5-5 INJECTION, SOLUTION INTRAVENOUS EVERY 30 MIN PRN
Status: DISCONTINUED | OUTPATIENT
Start: 2017-02-21 | End: 2017-02-21 | Stop reason: HOSPADM

## 2017-02-21 RX ORDER — FUROSEMIDE 10 MG/ML
20-100 INJECTION INTRAMUSCULAR; INTRAVENOUS
Status: DISCONTINUED | OUTPATIENT
Start: 2017-02-21 | End: 2017-02-21 | Stop reason: HOSPADM

## 2017-02-21 RX ORDER — NITROGLYCERIN 20 MG/100ML
.07-1.7 INJECTION INTRAVENOUS CONTINUOUS
Status: DISCONTINUED | OUTPATIENT
Start: 2017-02-21 | End: 2017-02-21 | Stop reason: HOSPADM

## 2017-02-21 RX ORDER — LORAZEPAM 0.5 MG/1
0.5 TABLET ORAL
Status: DISCONTINUED | OUTPATIENT
Start: 2017-02-21 | End: 2017-02-21 | Stop reason: HOSPADM

## 2017-02-21 RX ORDER — NALOXONE HYDROCHLORIDE 0.4 MG/ML
0.4 INJECTION, SOLUTION INTRAMUSCULAR; INTRAVENOUS; SUBCUTANEOUS EVERY 5 MIN PRN
Status: DISCONTINUED | OUTPATIENT
Start: 2017-02-21 | End: 2017-02-21 | Stop reason: HOSPADM

## 2017-02-21 RX ORDER — ONDANSETRON 4 MG/1
4 TABLET, ORALLY DISINTEGRATING ORAL EVERY 6 HOURS PRN
Status: DISCONTINUED | OUTPATIENT
Start: 2017-02-21 | End: 2017-02-22 | Stop reason: HOSPADM

## 2017-02-21 RX ORDER — PRASUGREL 10 MG/1
10-60 TABLET, FILM COATED ORAL
Status: DISCONTINUED | OUTPATIENT
Start: 2017-02-21 | End: 2017-02-21 | Stop reason: HOSPADM

## 2017-02-21 RX ORDER — FENTANYL CITRATE 50 UG/ML
25-50 INJECTION, SOLUTION INTRAMUSCULAR; INTRAVENOUS
Status: DISCONTINUED | OUTPATIENT
Start: 2017-02-21 | End: 2017-02-21 | Stop reason: HOSPADM

## 2017-02-21 RX ORDER — ENALAPRILAT 1.25 MG/ML
1.25-2.5 INJECTION INTRAVENOUS
Status: DISCONTINUED | OUTPATIENT
Start: 2017-02-21 | End: 2017-02-21 | Stop reason: HOSPADM

## 2017-02-21 RX ORDER — NALOXONE HYDROCHLORIDE 0.4 MG/ML
.1-.4 INJECTION, SOLUTION INTRAMUSCULAR; INTRAVENOUS; SUBCUTANEOUS
Status: DISCONTINUED | OUTPATIENT
Start: 2017-02-21 | End: 2017-02-22 | Stop reason: HOSPADM

## 2017-02-21 RX ORDER — ASPIRIN 325 MG
325 TABLET ORAL
Status: DISCONTINUED | OUTPATIENT
Start: 2017-02-21 | End: 2017-02-21 | Stop reason: HOSPADM

## 2017-02-21 RX ORDER — PROTAMINE SULFATE 10 MG/ML
1-5 INJECTION, SOLUTION INTRAVENOUS
Status: DISCONTINUED | OUTPATIENT
Start: 2017-02-21 | End: 2017-02-21 | Stop reason: HOSPADM

## 2017-02-21 RX ORDER — VERAPAMIL HYDROCHLORIDE 2.5 MG/ML
1-2.5 INJECTION, SOLUTION INTRAVENOUS
Status: COMPLETED | OUTPATIENT
Start: 2017-02-21 | End: 2017-02-21

## 2017-02-21 RX ORDER — HEPARIN SODIUM 1000 [USP'U]/ML
1000-10000 INJECTION, SOLUTION INTRAVENOUS; SUBCUTANEOUS EVERY 5 MIN PRN
Status: DISCONTINUED | OUTPATIENT
Start: 2017-02-21 | End: 2017-02-21 | Stop reason: HOSPADM

## 2017-02-21 RX ORDER — NITROGLYCERIN 5 MG/ML
100-500 VIAL (ML) INTRAVENOUS
Status: COMPLETED | OUTPATIENT
Start: 2017-02-21 | End: 2017-02-21

## 2017-02-21 RX ORDER — LIDOCAINE 40 MG/G
CREAM TOPICAL
Status: DISCONTINUED | OUTPATIENT
Start: 2017-02-21 | End: 2017-02-22 | Stop reason: HOSPADM

## 2017-02-21 RX ORDER — PROCHLORPERAZINE 25 MG
25 SUPPOSITORY, RECTAL RECTAL EVERY 12 HOURS PRN
Status: DISCONTINUED | OUTPATIENT
Start: 2017-02-21 | End: 2017-02-22 | Stop reason: HOSPADM

## 2017-02-21 RX ORDER — OXYCODONE HYDROCHLORIDE 5 MG/1
5-10 TABLET ORAL EVERY 6 HOURS PRN
Status: DISCONTINUED | OUTPATIENT
Start: 2017-02-21 | End: 2017-02-22 | Stop reason: HOSPADM

## 2017-02-21 RX ORDER — NITROGLYCERIN 5 MG/ML
100-200 VIAL (ML) INTRAVENOUS
Status: DISCONTINUED | OUTPATIENT
Start: 2017-02-21 | End: 2017-02-21 | Stop reason: HOSPADM

## 2017-02-21 RX ORDER — ASPIRIN 81 MG/1
81-324 TABLET, CHEWABLE ORAL
Status: DISCONTINUED | OUTPATIENT
Start: 2017-02-21 | End: 2017-02-21 | Stop reason: HOSPADM

## 2017-02-21 RX ORDER — ONDANSETRON 2 MG/ML
4 INJECTION INTRAMUSCULAR; INTRAVENOUS EVERY 4 HOURS PRN
Status: DISCONTINUED | OUTPATIENT
Start: 2017-02-21 | End: 2017-02-21 | Stop reason: HOSPADM

## 2017-02-21 RX ORDER — EPTIFIBATIDE 2 MG/ML
180 INJECTION, SOLUTION INTRAVENOUS EVERY 10 MIN PRN
Status: DISCONTINUED | OUTPATIENT
Start: 2017-02-21 | End: 2017-02-21 | Stop reason: HOSPADM

## 2017-02-21 RX ORDER — EPTIFIBATIDE 2 MG/ML
2 INJECTION, SOLUTION INTRAVENOUS CONTINUOUS PRN
Status: DISCONTINUED | OUTPATIENT
Start: 2017-02-21 | End: 2017-02-21 | Stop reason: HOSPADM

## 2017-02-21 RX ORDER — MORPHINE SULFATE 2 MG/ML
1 INJECTION, SOLUTION INTRAMUSCULAR; INTRAVENOUS
Status: DISCONTINUED | OUTPATIENT
Start: 2017-02-21 | End: 2017-02-22 | Stop reason: HOSPADM

## 2017-02-21 RX ORDER — ACETAMINOPHEN 650 MG/1
650 SUPPOSITORY RECTAL EVERY 4 HOURS PRN
Status: DISCONTINUED | OUTPATIENT
Start: 2017-02-21 | End: 2017-02-22 | Stop reason: HOSPADM

## 2017-02-21 RX ORDER — HYDRALAZINE HYDROCHLORIDE 20 MG/ML
10-20 INJECTION INTRAMUSCULAR; INTRAVENOUS
Status: DISCONTINUED | OUTPATIENT
Start: 2017-02-21 | End: 2017-02-21 | Stop reason: HOSPADM

## 2017-02-21 RX ORDER — LORAZEPAM 2 MG/ML
0.5 INJECTION INTRAMUSCULAR
Status: DISCONTINUED | OUTPATIENT
Start: 2017-02-21 | End: 2017-02-21 | Stop reason: HOSPADM

## 2017-02-21 RX ORDER — FLUMAZENIL 0.1 MG/ML
0.2 INJECTION, SOLUTION INTRAVENOUS
Status: ACTIVE | OUTPATIENT
Start: 2017-02-21 | End: 2017-02-22

## 2017-02-21 RX ORDER — FLUMAZENIL 0.1 MG/ML
0.2 INJECTION, SOLUTION INTRAVENOUS
Status: DISCONTINUED | OUTPATIENT
Start: 2017-02-21 | End: 2017-02-21 | Stop reason: HOSPADM

## 2017-02-21 RX ORDER — CLOPIDOGREL BISULFATE 75 MG/1
300-600 TABLET ORAL
Status: DISCONTINUED | OUTPATIENT
Start: 2017-02-21 | End: 2017-02-21 | Stop reason: HOSPADM

## 2017-02-21 RX ORDER — ASPIRIN 325 MG
325 TABLET ORAL DAILY
Status: DISCONTINUED | OUTPATIENT
Start: 2017-02-22 | End: 2017-02-21

## 2017-02-21 RX ORDER — HYDRALAZINE HYDROCHLORIDE 20 MG/ML
10 INJECTION INTRAMUSCULAR; INTRAVENOUS EVERY 4 HOURS PRN
Status: DISCONTINUED | OUTPATIENT
Start: 2017-02-21 | End: 2017-02-22 | Stop reason: HOSPADM

## 2017-02-21 RX ORDER — HYDROCODONE BITARTRATE AND ACETAMINOPHEN 5; 325 MG/1; MG/1
1-2 TABLET ORAL EVERY 4 HOURS PRN
Status: DISCONTINUED | OUTPATIENT
Start: 2017-02-21 | End: 2017-02-22 | Stop reason: HOSPADM

## 2017-02-21 RX ORDER — LIDOCAINE 40 MG/G
CREAM TOPICAL
Status: DISCONTINUED | OUTPATIENT
Start: 2017-02-21 | End: 2017-02-21

## 2017-02-21 RX ADMIN — ROSUVASTATIN CALCIUM 40 MG: 20 TABLET, FILM COATED ORAL at 21:02

## 2017-02-21 RX ADMIN — MIDAZOLAM HYDROCHLORIDE 8 MG: 1 INJECTION, SOLUTION INTRAMUSCULAR; INTRAVENOUS at 16:38

## 2017-02-21 RX ADMIN — FENTANYL CITRATE 300 MCG: 50 INJECTION, SOLUTION INTRAMUSCULAR; INTRAVENOUS at 16:37

## 2017-02-21 RX ADMIN — LISINOPRIL 10 MG: 10 TABLET ORAL at 14:46

## 2017-02-21 RX ADMIN — NITROGLYCERIN 200 MCG: 5 INJECTION, SOLUTION INTRAVENOUS at 15:41

## 2017-02-21 RX ADMIN — TICAGRELOR 180 MG: 90 TABLET ORAL at 16:01

## 2017-02-21 RX ADMIN — ASPIRIN 325 MG ORAL TABLET 325 MG: 325 PILL ORAL at 10:57

## 2017-02-21 RX ADMIN — ADENOSINE 120 MCG: 3 INJECTION, SOLUTION INTRAVENOUS at 16:35

## 2017-02-21 RX ADMIN — GABAPENTIN 1200 MG: 600 TABLET, FILM COATED ORAL at 20:09

## 2017-02-21 RX ADMIN — SODIUM CHLORIDE: 9 INJECTION, SOLUTION INTRAVENOUS at 14:15

## 2017-02-21 RX ADMIN — Medication 3000 UNITS: at 16:16

## 2017-02-21 RX ADMIN — OXYCODONE HYDROCHLORIDE 10 MG: 5 TABLET ORAL at 22:08

## 2017-02-21 RX ADMIN — METOPROLOL TARTRATE 12.5 MG: 25 TABLET, FILM COATED ORAL at 21:02

## 2017-02-21 RX ADMIN — VERAPAMIL HYDROCHLORIDE 2.5 MG: 2.5 INJECTION, SOLUTION INTRAVENOUS at 15:40

## 2017-02-21 RX ADMIN — Medication 4000 UNITS: at 16:02

## 2017-02-21 RX ADMIN — ACETAMINOPHEN 650 MG: 325 TABLET, FILM COATED ORAL at 22:08

## 2017-02-21 RX ADMIN — LIDOCAINE HYDROCHLORIDE 10 MG: 10 INJECTION, SOLUTION EPIDURAL; INFILTRATION; INTRACAUDAL; PERINEURAL at 15:42

## 2017-02-21 RX ADMIN — NITROGLYCERIN 200 MCG: 5 INJECTION, SOLUTION INTRAVENOUS at 16:18

## 2017-02-21 RX ADMIN — HYDROCODONE BITARTRATE AND ACETAMINOPHEN 2 TABLET: 5; 325 TABLET ORAL at 17:21

## 2017-02-21 RX ADMIN — HEPARIN SODIUM 12 UNITS/KG/HR: 10000 INJECTION, SOLUTION INTRAVENOUS at 10:58

## 2017-02-21 RX ADMIN — Medication 5000 UNITS: at 15:47

## 2017-02-21 RX ADMIN — NITROGLYCERIN 0.07 MCG/KG/MIN: 20 INJECTION INTRAVENOUS at 10:56

## 2017-02-21 RX ADMIN — IOPAMIDOL 190 ML: 755 INJECTION, SOLUTION INTRAVASCULAR at 17:00

## 2017-02-21 ASSESSMENT — ENCOUNTER SYMPTOMS
LIGHT-HEADEDNESS: 0
SHORTNESS OF BREATH: 0
NAUSEA: 0
ABDOMINAL PAIN: 0
ARTHRALGIAS: 0
FEVER: 0
DIZZINESS: 0
WEAKNESS: 0
COUGH: 0
ACTIVITY CHANGE: 0
DYSURIA: 0
MYALGIAS: 0
PALPITATIONS: 0
CHILLS: 0
VOMITING: 0
HEADACHES: 0

## 2017-02-21 ASSESSMENT — ACTIVITIES OF DAILY LIVING (ADL): FALL_HISTORY_WITHIN_LAST_SIX_MONTHS: NO

## 2017-02-21 NOTE — CONSULTS
CARDIOLOGY CONSULTATION AND ADMISSION HISTORY AND PHYSICAL       REQUESTING PHYSICIAN:  None stated.       DATE OF CONSULTATION AND ADMISSION:  02/21/2017      INDICATION FOR ADMISSION:  Progressive-unstable angina in a 46-year-old male with only previous history of being overweight and hypertensive.  He has a strong family history for CAD and now presents with angina.      Mr. Cecil Vasquez is 46.  He came for a stress EKG today at [facility].  He developed severe chest pain with that and nonspecific ST-T changes, so after resting and a nitroglycerin sublingually, we repeated the stress test doing a stress echo.  This was clearly abnormal, developing severe chest pain, anteroseptal apical hypokinesia, a millimeter of of ST depression in the inferolateral leads, all profoundly and I believe diagnostic of early positive stress test-angina.  He was somewhat tachycardic during the study with a resting heart rate of 112 beats per minute.  His heart rate john to 166 beats per minute, blood pressure 180/110.  He had a millimeter of ST depression and chest pain after the stress test was appreciated to be positive.  He was given a nitroglycerin after the stress EKG and then subsequently after the stress echo.  This quieted down his chest pain and he was sent to the Emergency Room for transfer for coronary angiography.      Mr. Vasquez had intermittent angina, he thinks, on and off for a couple of months.  More recently, however, it has been more easily provokable and more severe.  He had a couple of episodes about a week ago where he had 5 episodes of chest pain during the day.  Remarkably this did not trigger concern until he came to the Emergency Room yesterday.  All of his findings at that time were normal.  His EKG was unremarkable.  Enzymes were negative, and he was set up for the aforementioned stress test.  His stress test is early positive, and he is transferred now with a diagnosis of a  significant LAD lesion with extensive anteroseptal apical ischemia.      PAST MEDICAL HISTORY:  Includes:    1. Trigeminal neuralgia, recently on gabapentin, Wellbutrin and oxycodone.   2. Hypertension.   3. Hyperlipidemia, not currently on any medications.   4. Obesity.   5. Asthma, patient reports this is well controlled.   6. Bipolar disorder.   7. History of nephrolithiasis.   8. Depression.   9. ADHD.   10. Impaired fasting glucose.          MEDICATIONS:  He has some medications, but remarkably has not been taking any of them including the metoprolol that Dr. Hinton in the Emergency Room recommended.      The medications on his list that he has not been taking include:    1.  Albuterol p.r.n.   2.  Aspirin 81 mg a day.   3.  Gabapentin 800 mg at lunch and 1200 mg at night.   4.  DuoNeb p.r.n.   5.  Ativan p.r.n.   6.  Melatonin p.r.n. at bedtime.    7. Oxycodone p.r.n. pain.   8.  Ultram p.r.n. pain.   9.  Advil p.r.n.      FAMILY HISTORY:  Positive for both a mother and father who had coronary disease.      The patient himself does not smoke.  He has not been treating his hypertension or hyperlipidemia.      LABORATORY WORK:  Done 3 days ago.  Showed creatinine 0.9, BUN 16.  Troponin was 0.03.  Glucose 138.  Hemoglobin 14 grams, white count 8000.      Chest x-ray showed normal heart and lungs.      His last LDL was 12/2015, was over 200.      Albumin earlier this month 4.1, alkaline phosphatase normal.  Hemoglobin A1c 6.1.  Total cholesterol 308, triglycerides 295.      TSH 1.9.  That was from 2 years ago.      REVIEW OF SYSTEMS:  Negative for a 10-point review.  He has had no bleeding disorders, no surgeries, no malignancy.  He is not diabetic.  He has been hypertensive, but he has not been taking his medications.      PHYSICAL EXAMINATION:   GENERAL:  Well-developed, overweight male.   VITAL SIGNS:  Blood pressures at rest were 130/80 with exercise.  They went up to 180/110.  He did have sinus tachycardia.    HEENT:  Normal.   NECK:  Free of neck vein distention or bruits at 20 degrees.   HEART:  Regular, tachycardic post-exercise.  No S3 or S4 heard.   LUNGS:  Clear.   ABDOMEN:  Soft, rotund, firm, nontender.   EXTREMITIES:  Show +1 pedal pulses and no edema.      This 46-year-old gentleman with a strong family history for CAD also has CAD.  He has had progressive angina and early positive stress test, extensive anteroseptal apical ischemia, hypertension with exercise and some medication noncompliance.  He is a very nice man and I think he is a very smart man, but he has kind been on auto  with regards to his health issues.      I reviewed coronary angiography and the risks and benefits.  I reviewed the need for ongoing medical therapy, diet, weight loss, exercise, lipid management, blood pressure control, etc.  Fortunately, he is not a smoker.      He understands.  He was eager to proceed with consideration of angiography.      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  I think bypass surgery is less likely.  I did not review that with him at length.  That can be done by the consulting cardiologist at [facility].  I did review risks and benefits including death, myocardial infarction, stroke, urgent surgery, bleeding, dye reactions, vascular injury, kidney injury, etc.  He was eager to proceed to [facility].     1)  Significant CAD => class III angina/[place]/Early positive stress test  2)  Significant LAD lesion likely  3)  Lipid need attention  4)  ++ FH for CAD   5)  Some medical non compliance  6)  Over weight      Over an hour was spent doing and coordinating his consult, transfer, speaking to multiple physicians including Dr. Hinton, Dr. Francisco and nursing staff, etc.         DWIGHT NATH MD, [facility]             D: 02/21/2017 11:44   T: 02/21/2017 12:29    MT: DANNY      Name:     CECILIO BLANCO   MRN:      [id number]        Account:       [id number]   :      1970           Consult Date:  2017      Document: [id number]       cc: Dave Cole MD

## 2017-02-21 NOTE — TELEPHONE ENCOUNTER
albuterol (ALBUTEROL) 108 (90 BASE) MCG/ACT Inhaler       Last Written Prescription Date: 2/3/17  Last Fill Quantity: 1, # refills: 0    Last Office Visit with G, P or [facility] prescribing provider:  2/3/17   Future Office Visit:    Next 5 appointments (look out 90 days)     Feb 28, 2017  9:20 AM CST   Office Visit with Gonzalo Cole MD   [facility]    [address] [place] [id number] [phone]                   Date of Last Asthma Action Plan Letter:   Asthma Action Plan Q1 Year    Topic Date Due     Asthma Action Plan - yearly  02/16/2017      Asthma Control Test:   ACT Total Scores 12/16/2016   ACT TOTAL SCORE (Goal Greater than or Equal to 20) 14   In the past 12 months, how many times did you visit the emergency room for your asthma without being admitted to the hospital? 1   In the past 12 months, how many times were you hospitalized overnight because of your asthma? 0       Date of Last Spirometry Test:   No results found for this or any previous visit.

## 2017-02-21 NOTE — PLAN OF CARE
Problem: Goal Outcome Summary  Goal: Goal Outcome Summary  Outcome: No Change  Pt has been pain free with V/signs stable with NTG gtt at 0.07mcg/kg/min and maintaining his O2 sats in the mid 90's on Rm air. Heparin gtt was started at [facility] around 10:30 and is infusing at 1400 units per hour.  Angiogram was ordered by Dr Francisco and pt was sent to Cath Lab around 15:15 with report given to Cath Lab. Remains in NSR/ Sinus Tach. Family at Bedside.

## 2017-02-21 NOTE — ED PROVIDER NOTES
History     Chief Complaint   Patient presents with     Heart Problem     The history is provided by the patient, a caregiver and medical records.     Cecil Vasquez is a 46 year old male who is here from the cardiology department.  He was down at the cardiology Department for an exercise stress test today with Dr. Wallace.  He had 8 of 10 chest pain with his stress test.  They gave him a nitroglycerin tablet and his pain improved.  After a short rest they did an echo stress test.  The echo shows wall motion abnormality consistent with an LAD lesion.  Dr. Wallace is going to speak to cardiology down at [facility] about transfer.  The patient is here in the emergency department.  At the time he was here he has no chest pain or discomfort.    I have reviewed the Medications, Allergies, Past Medical and Surgical History, and Social History in the Epic system.    Review of Systems   Constitutional: Negative for activity change, chills and fever.   Respiratory: Negative for cough and shortness of breath.    Cardiovascular: Positive for chest pain. Negative for palpitations and leg swelling.   Gastrointestinal: Negative for abdominal pain, nausea and vomiting.   Genitourinary: Negative for decreased urine volume and dysuria.   Musculoskeletal: Negative for arthralgias and myalgias.   Skin: Negative for pallor and rash.   Neurological: Negative for dizziness, syncope, weakness, light-headedness and headaches.   All other systems reviewed and are negative.      Physical Exam      Physical Exam   Constitutional: He is oriented to person, place, and time. He appears well-developed and well-nourished. No distress.   HENT:   Head: Normocephalic and atraumatic.   Right Ear: External ear normal.   Left Ear: External ear normal.   Mouth/Throat: Oropharynx is clear and moist.   Eyes: Conjunctivae and EOM are normal.   Neck: Normal range of motion.   Cardiovascular: Regular rhythm, normal heart sounds and intact distal pulses.     No murmur heard.  Tachycardia noted   Pulmonary/Chest: Effort normal and breath sounds normal. No respiratory distress. He has no wheezes.   Abdominal: Soft. He exhibits no distension. There is no tenderness.   Neurological: He is alert and oriented to person, place, and time.   Skin: No rash noted. No pallor.   Psychiatric: He has a normal mood and affect. His behavior is normal.       ED Course  10:36 AM  I spoke with Dr. Reeves , hospitalist at [facility], about this patient and he is accepted the patient in transfer.  We are starting an IV, will do some baseline labs and we will get him started on nitro and heparin drips.  We will plan transfer by ground to [facility].         ED Course     Procedures        EKG done in cardiology department    No results found for this or any previous visit (from the past 24 hour(s)).    Medications   lidocaine 1 % 1 mL (not administered)   lidocaine (LMX4) kit (not administered)   sodium chloride (PF) 0.9% PF flush 3 mL (not administered)   sodium chloride (PF) 0.9% PF flush 3 mL (3 mLs Intracatheter Given 2/21/17 1048)   heparin infusion 25,000 units in 0.45% NaCl 250 mL (not administered)   nitroglycerin 50 mg in D5W 250 mL (adult std) infusion (not administered)   aspirin tablet 325 mg (not administered)         Assessments & Plan (with Medical Decision Making)  Cecil came to the emergency department from the cardiology department downstairs.  He failed a stress test and an echo stress test.  The patient is having symptoms consistent with LAD lesion.  He was brought up to the emergency department we started heparin and nitroglycerin drips.  We did give the patient an aspirin.  I have drawn baseline labs.  He is going to be transferred to [facility].  Dr. Reeves, hospitalist, has accepted the care of this patient.  Dr. Wallace was going to speak to one of his cardiology partners at [facility] about an angiogram.       I have reviewed the nursing notes.    I have  reviewed the findings, diagnosis, plan and need for follow up with the patient.    New Prescriptions    No medications on file       Final diagnoses:   Acute coronary syndrome (H)       2/21/2017   [facility] EMERGENCY DEPARTMENT     Georgi Hinton MD  02/21/17 1054

## 2017-02-21 NOTE — ED NOTES
Pt comes from cardiology where he failed his stress test. Will be transferring to CCU at [facility]

## 2017-02-21 NOTE — IP AVS SNAPSHOT
MRN:[id number]                      After Visit Summary   2/21/2017    Cecil Vasquez    MRN: [id number]           Thank you!     Thank you for choosing [facility] for your care. Our goal is always to provide you with excellent care. Hearing back from our patients is one way we can continue to improve our services. Please take a few minutes to complete the written survey that you may receive in the mail after you visit with us. Thank you!        Patient Information     Date Of Birth          1970        About your hospital stay     You were admitted on:  February 21, 2017 You last received care in the:  [facility] Coronary Care Unit    You were discharged on:  February 22, 2017        Reason for your hospital stay       Chest pain and symptoms during stress test concerning for diminished blood flow to your heart - essentially a heart attack.                  Who to Call     For medical emergencies, please call 911.  For non-urgent questions about your medical care, please call your primary care provider or clinic, [phone]          Attending Provider     Provider Specialty    Fransisca Reeves MD Internal Medicine       Primary Care Provider Office Phone # Fax #    Gonzalo Cole [phone]       [address]   [facility] [address]        After Care Instructions     Activity       Your activity upon discharge: activity as tolerated            Diet       Follow this diet upon discharge:    Combination Diet Low Saturated Fat Na <2400mg Diet            Monitor and record       blood pressure daily                  Follow-up Appointments     Follow-up and recommended labs and tests        Follow up with primary care provider, Gonzalo Cole, within 7 days for hospital follow- up.  The following labs/tests are recommended: BMP and CBC.                  Your next 10 appointments already scheduled     Feb 28, 2017  9:00 AM CST    Evaluation 105 with RUBIN Woodall   [facility] ([facility])    [address] [place] [address] [address] [place] [id number] [phone]            Feb 28, 2017  9:20 AM CST   [facility] with Gonzalo Cole MD   [facility]    [address] [place] [id number] [phone]           Bring a current list of meds and any records pertaining to this visit.  For Physicals, please bring immunization records and any forms needing to be filled out.  Please arrive 10 minutes early to complete paperwork.            Mar 23, 2017 10:40 AM CDT   Return Visit with Rose Durham PA-C   [facility] ([address] [id number] [phone]              Additional Services     Cardiac Rehab Referral       Your provider has referred you to: [facility]: [facility] [phone]   [email or web address]            Follow-Up with Cardiac Advanced Practice Provider                 Future tests that were ordered for you     Basic metabolic panel           CBC with platelets       Last Lab Result: Hemoglobin (g/dL)       Date                     Value                 02/22/2017               14.3             ----------            Lipid Profile                 Further instructions from your care team       Due to insurance coverage we will be switching you from Brilinta to Effient.  - This means you will take 1 dose of Brilinta tonight at 8pm.  - On Thursday you will take 6 tablets of Effient.  - On Friday and thereafter you will take 1 tablets of Effient daily.    Start other new medications as described on bottles.    Follow up with Dr. Cole as scheduled on 2/28 for close follow up and assessment after starting new medications. Will need to obtain further pain medications if needed from him then. Lab draw  prior to appointment.    Follow up with cardiology as scheduled on March 23rd at 10:40 AM with Rose Jacobo PA-C.  Discharge Instructions for Coronary Angioplasty and Stenting  During your angioplasty, a doctor inserts a thin tube called a catheter into a blood vessel in your groin or wrist. The catheter is pushed through your blood vessel to a blocked area in one of your heart s arteries. The doctor inflates a tiny balloon at the tip of the catheter and stretches the blocked vessel so blood can flow freely. The balloon is then deflated and removed with the catheter. The doctor may also insert a metal mesh tube called a stent in the blocked vessel.  The stent helps the vessel stay open.  Home care    Ask someone to drive you to your appointments for the next few days.    Rest for 2 to 3 days after the procedure. Most people are able to resume normal activity within a few days.    Take your temperature and check your incision for signs of infection (redness, swelling, drainage, or warmth) every day for a week. It is normal to have a small bruise or bump where the catheter was inserted.    Take your medications exactly as directed. Don t skip doses. It is important to take aspirin or other similar drugs for as long as your doctor advises.If you were also prescribed clopidogrel, prasugrel, or ticagrelor, it is very important to take these medications, as well. These drugs prevent clots that could cause a heart attack. If you have a problem with any of your drugs, call your doctor right away.    Unless directed otherwise, stay hydrated to help flush your body of the dye that was used during your angioplasty.    Eat a healthy diet that is low in fat, salt, and cholesterol. Ask your doctor for menus and other diet information.    Exercise according to your doctor's recommendation. Depending on your situation, your doctor may recommend you start a cardiac rehabilitation program.    Avoid swimming or taking baths for 5 to 7  "days. You may shower the day after the procedure.  Follow-up care  Make a follow-up appointment as directed by our staff.     When to call your doctor  Call your doctor immediately if you have any of the following:    Chest pain or a return of the symptoms you had prior to the angioplasty    Constant or increasing pain or numbness in your leg, or if your leg looks blue or feels cold    Fever above 100.4 F (38.0 C) or other signs of infection (redness, swelling, drainage, or warmth at the incision site of the leg or wrist)    Shortness of breath    Bleeding, bruising, or a large swelling where the catheter (tube) was inserted    Blood in your urine    Black or tarry stools     8320-0996 [employer]. [address] [place] [address]. All rights reserved. This information is not intended as a substitute for professional medical care. Always follow your healthcare professional's instructions.          Pending Results     Date and Time Order Name Status Description    2/22/2017 1113 EKG 12-lead, tracing only Preliminary     2/21/2017 1705 EKG 12-lead, tracing only  Preliminary             Statement of Approval     Ordered          02/22/17 1108  I have reviewed and agree with all the recommendations and orders detailed in this document.  EFFECTIVE NOW     Comments:  OK to discharge after cardiac rehab if no new concerns.   Approved and electronically signed by:  Barthell, Joanna Kersten Ulmen, PA-C             Admission Information     Date & Time Provider Department Dept. Phone    2/21/2017 Fransisca Reeves MD [facility] [phone]      Your Vitals Were     Blood Pressure Pulse Temperature Respirations Height Weight    113/77 98 97.8  F (36.6  C) (Oral) 20 1.753 m (5' 9\") 116.6 kg (257 lb 0.9 oz)    Pulse Oximetry BMI (Body Mass Index)                94% 37.96 kg/m2          MyChart Information     [employer] lets you send messages to your doctor, view your test results, " "renew your prescriptions, schedule appointments and more. To sign up, go to [email or web address] . Click on \"Log in\" on the left side of the screen, which will take you to the Welcome page. Then click on \"Sign up Now\" on the right side of the page.     You will be asked to enter the access code listed below, as well as some personal information. Please follow the directions to create your username and password.     Your access code is: [email or web address]  Expires: 2017  8:49 PM     Your access code will  in 90 days. If you need help or a new code, please call your [facility] clinic or [phone].        Care EveryWhere ID     This is your Care EveryWhere ID. This could be used by other organizations to access your [facility] medical records  [id number]           Review of your medicines      UNREVIEWED medicines. Ask your doctor about these medicines        Dose / Directions    VENTOLIN  (90 BASE) MCG/ACT Inhaler   This may have changed:  See the new instructions.   Used for:  Asthma with acute exacerbation, unspecified asthma severity   Generic drug:  albuterol   Ask about: Which instructions should I use?        INHALE 2 PUFFS INTO THE LUNGS EVERY 4 HOURS AS NEEDED FOR SHORTNESS OF BREATH OR DIFFICULTY BREATHING   Quantity:  18 g   Refills:  0         START taking        Dose / Directions    lisinopril 10 MG tablet   Commonly known as:  PRINIVIL/ZESTRIL   Used for:  Essential hypertension with goal blood pressure less than 140/90   Notes to Patient:  High blood pressure /heart         Dose:  10 mg   Take 1 tablet (10 mg) by mouth daily   Quantity:  30 tablet   Refills:  0       metoprolol 25 MG tablet   Commonly known as:  LOPRESSOR   Used for:  Essential hypertension with goal blood pressure less than 140/90   Notes to Patient:  Heart/blood pressure         Dose:  12.5 mg   Take 0.5 tablets (12.5 mg) by mouth 2 times daily   Quantity:  30 tablet   Refills:  0       prasugrel 10 MG Tabs tablet "   Commonly known as:  EFFIENT   Used for:  Coronary artery disease involving native coronary artery of native heart with unstable angina pectoris (H)   Notes to Patient:  Stent/blood thinner         Take 6 tabs PO on Thursday, 2/23/2017. Starting Friday, 2/24 take 1 tab PO daily.   Quantity:  36 tablet   Refills:  0       rosuvastatin 40 MG tablet   Commonly known as:  CRESTOR   Notes to Patient:  cholesterol         Dose:  40 mg   Take 1 tablet (40 mg) by mouth daily   Quantity:  30 tablet   Refills:  0       ticagrelor 90 MG tablet   Commonly known as:  BRILINTA   Used for:  Coronary artery disease involving native coronary artery of native heart with unstable angina pectoris (H)   Notes to Patient:  Blood thinner/for stent         Take 1 tablet at 8pm on Wednesday 2/22/2017.   Quantity:  1 tablet   Refills:  0         CONTINUE these medicines which may have CHANGED, or have new prescriptions. If we are uncertain of the size of tablets/capsules you have at home, strength may be listed as something that might have changed.        Dose / Directions    * aspirin 81 MG EC tablet   This may have changed:  Another medication with the same name was added. Make sure you understand how and when to take each.   Used for:  Acute chest pain   Notes to Patient:  Heart         Dose:  81 mg   Take 1 tablet (81 mg) by mouth daily   Quantity:  90 tablet   Refills:  3       * aspirin 81 MG EC tablet   This may have changed:  You were already taking a medication with the same name, and this prescription was added. Make sure you understand how and when to take each.   Used for:  Coronary artery disease involving native coronary artery of native heart with unstable angina pectoris (H)        Dose:  81 mg   Take 1 tablet (81 mg) by mouth daily   Quantity:  120 tablet   Refills:  0       * Notice:  This list has 2 medication(s) that are the same as other medications prescribed for you. Read the directions carefully, and ask your doctor  or other care provider to review them with you.      CONTINUE these medicines which have NOT CHANGED        Dose / Directions    acetaminophen 325 MG tablet   Commonly known as:  TYLENOL   Used for:  Trigeminal neuralgia   Notes to Patient:  For pain, do not exceed more than 4000mg of Tylenol in 24 hours.         Dose:  650 mg   Take 2 tablets (650 mg) by mouth every 4 hours as needed for mild pain   Quantity:  100 tablet   Refills:  11       ipratropium - albuterol 0.5 mg/2.5 mg/3 mL 0.5-2.5 (3) MG/3ML neb solution   Commonly known as:  DUONEB   Used for:  Asthma with acute exacerbation, unspecified asthma severity        Dose:  1 vial   Take 1 vial (3 mLs) by nebulization every 4 hours as needed for shortness of breath / dyspnea   Quantity:  30 vial   Refills:  0       melatonin 3 MG tablet   Used for:  Trigeminal neuralgia        Dose:  3 mg   Take 1 tablet (3 mg) by mouth nightly as needed for sleep   Quantity:  30 tablet   Refills:  1       * NEURONTIN PO   Notes to Patient:  For nerve pain        Dose:  800 mg   Take 800 mg by mouth 2 times daily Am & lunchtime.   Refills:  0       * NEURONTIN PO        Dose:  1200 mg   Take 1,200 mg by mouth At Bedtime   Refills:  0       oxyCODONE 5 MG IR tablet   Commonly known as:  ROXICODONE   Used for:  Neuropathy (H)   Notes to Patient:  For pain         Dose:  5-10 mg   Take 1-2 tablets (5-10 mg) by mouth every 6 hours as needed for moderate to severe pain 30 day supply   Quantity:  30 tablet   Refills:  0       traMADol 50 MG tablet   Commonly known as:  ULTRAM   Used for:  Neuropathy (H)   Notes to Patient:  For pain         Dose:   mg   Take 1-2 tablets ( mg) by mouth every 6 hours as needed for moderate pain   Quantity:  30 tablet   Refills:  0       * Notice:  This list has 2 medication(s) that are the same as other medications prescribed for you. Read the directions carefully, and ask your doctor or other care provider to review them with you.       STOP taking     TOPROL XL PO                Where to get your medicines      These medications were sent to [facility] [place], [place] - [address]  [address], [place] [address]     Phone:  [phone]     aspirin 81 MG EC tablet    lisinopril 10 MG tablet    metoprolol 25 MG tablet    prasugrel 10 MG Tabs tablet    rosuvastatin 40 MG tablet    ticagrelor 90 MG tablet         These medications were sent to [facility] [place] - [address]   [address] [facility] , [facility] [address]     Phone:  [phone]     VENTOLIN  (90 BASE) MCG/ACT Inhaler         Some of these will need a paper prescription and others can be bought over the counter. Ask your nurse if you have questions.     Bring a paper prescription for each of these medications     traMADol 50 MG tablet                Protect others around you: Learn how to safely use, store and throw away your medicines at www.disposemymeds.org.             Medication List: This is a list of all your medications and when to take them. Check marks below indicate your daily home schedule. Keep this list as a reference.      Medications           Morning Afternoon Evening Bedtime As Needed    acetaminophen 325 MG tablet   Commonly known as:  TYLENOL   Take 2 tablets (650 mg) by mouth every 4 hours as needed for mild pain   Last time this was given:  650 mg on 2/22/2017 12:44 PM   Notes to Patient:  For pain, do not exceed more than 4000mg of Tylenol in 24 hours.                                    * aspirin 81 MG EC tablet   Take 1 tablet (81 mg) by mouth daily   Last time this was given:  81 mg on 2/22/2017  8:14 AM   Next Dose Due:  2/23/17   Notes to Patient:  Heart             8am                       * aspirin 81 MG EC tablet   Take 1 tablet (81 mg) by mouth daily   Last time this was given:  81 mg on 2/22/2017  8:14 AM                                ipratropium - albuterol 0.5 mg/2.5 mg/3 mL 0.5-2.5 (3)  MG/3ML neb solution   Commonly known as:  DUONEB   Take 1 vial (3 mLs) by nebulization every 4 hours as needed for shortness of breath / dyspnea                                   lisinopril 10 MG tablet   Commonly known as:  PRINIVIL/ZESTRIL   Take 1 tablet (10 mg) by mouth daily   Last time this was given:  10 mg on 2/22/2017  8:14 AM   Next Dose Due:  2/23/17   Notes to Patient:  High blood pressure /heart             8am                       melatonin 3 MG tablet   Take 1 tablet (3 mg) by mouth nightly as needed for sleep                                   metoprolol 25 MG tablet   Commonly known as:  LOPRESSOR   Take 0.5 tablets (12.5 mg) by mouth 2 times daily   Last time this was given:  12.5 mg on 2/22/2017  8:14 AM   Next Dose Due:  2/22/17   Notes to Patient:  Heart/blood pressure             8am                8pm  Due today            * NEURONTIN PO   Take 800 mg by mouth 2 times daily Am & lunchtime.   Last time this was given:  800 mg on 2/22/2017 12:40 PM   Next Dose Due:  2/22/17   Notes to Patient:  For nerve pain            8am       12pm                   * NEURONTIN PO   Take 1,200 mg by mouth At Bedtime   Last time this was given:  800 mg on 2/22/2017 12:40 PM                        8pm  Due today            oxyCODONE 5 MG IR tablet   Commonly known as:  ROXICODONE   Take 1-2 tablets (5-10 mg) by mouth every 6 hours as needed for moderate to severe pain 30 day supply   Last time this was given:  10 mg on 2/22/2017 12:44 PM   Notes to Patient:  For pain                                    prasugrel 10 MG Tabs tablet   Commonly known as:  EFFIENT   Take 6 tabs PO on Thursday, 2/23/2017. Starting Friday, 2/24 take 1 tab PO daily.   Next Dose Due:  2/24/17   Notes to Patient:  Stent/blood thinner             8am   Due 2/23/17  Please read instructions carefully                        rosuvastatin 40 MG tablet   Commonly known as:  CRESTOR   Take 1 tablet (40 mg) by mouth daily   Last time this was  given:  40 mg on 2/21/2017  9:02 PM   Next Dose Due:  2/22/14   Notes to Patient:  cholesterol                         9pm  Due today            ticagrelor 90 MG tablet   Commonly known as:  BRILINTA   Take 1 tablet at 8pm on Wednesday 2/22/2017.   Last time this was given:  90 mg on 2/22/2017  8:14 AM   Notes to Patient:  Blood thinner/for stent                         8pm   Due today            traMADol 50 MG tablet   Commonly known as:  ULTRAM   Take 1-2 tablets ( mg) by mouth every 6 hours as needed for moderate pain   Last time this was given:  100 mg on 2/22/2017  8:32 AM   Notes to Patient:  For pain                                    * Notice:  This list has 4 medication(s) that are the same as other medications prescribed for you. Read the directions carefully, and ask your doctor or other care provider to review them with you.      ASK your doctor about these medications           Morning Afternoon Evening Bedtime As Needed    VENTOLIN  (90 BASE) MCG/ACT Inhaler   INHALE 2 PUFFS INTO THE LUNGS EVERY 4 HOURS AS NEEDED FOR SHORTNESS OF BREATH OR DIFFICULTY BREATHING   Generic drug:  albuterol   Ask about: Which instructions should I use?

## 2017-02-21 NOTE — H&P
PRIMARY CARE PHYSICIAN:  Gonzalo Cole MD      CHIEF COMPLAINT:  Chest pain.      HISTORY OF PRESENT ILLNESS:  Cecil Vasquez is a 46-year-old male with a past medical history significant for hypertension, hyperlipidemia, obesity, mild intermittent asthma, nephrolithiasis, bipolar disorder, depression, impaired fasting glucose and a history of trigeminal neuralgia who is a direct admission to [facility] from [place] for management of unstable angina.  The patient reports that for the past 3 weeks he has been experiencing intermittent chest discomfort on exertion which he describes as a severe squeezing.  This is accompanied by bilateral dental pain, shoulder discomfort and diaphoresis.  The patient reports that when this first began he would only experience this chest pain with fairly significant exertion such as lifting heavy boxes at work.  However, over the past couple of weeks, it has become more easily provoked and more severe.  He has been seen in the Emergency Department twice in the past week, once on 02/13, at which time he had a  negative troponin, a normal EKG and was prescribed lorazepam for possible panic attacks.  He was seen again on 02/19 and discharged with instruction to follow up with outpatient stress testing at that point.  He reports again that his pain has been progressively worsening and that over the past few days the pain has been coming on both with exertion and at rest.  Episodes have become more frequent during the day as well.  The patient presented to his previously scheduled outpatient stress test this morning and within a few minutes of beginning this test developed significant chest pain along with nonspecific ST-T changes.  He was given nitro and the stress test was attempted again, with obviously abnormal results showing anteroseptal hypokinesis and ST depression.  He was evaluated by Dr. Wallace of Cardiology at the time, who recommended  initiating IV heparin and nitroglycerin drip and transferring the patient to [facility] for likely coronary catheterization.      The patient is presently evaluated in his room in the CICU.  He is not experiencing any chest pain at present and also denies shortness of breath, jaw discomfort, nausea, vomiting, visual changes or diaphoresis.  He last had something to eat around 8:00 a.m. this morning.  He also tells me that he has been concerned that his chest pain may be a reaction to his medications, so he stopped all of his medications in the past few weeks.      REVIEW OF SYSTEMS:  A 10-point review was conducted and is negative aside from the information in the HPI.      PAST MEDICAL HISTORY:   1.  Trigeminal neuralgia, recently on gabapentin, Wellbutrin and oxycodone.   2.  Hypertension.   3.  Hyperlipidemia, not currently on any medications.   4.  Obesity.   5.  Asthma, patient reports this is well controlled.   6.  Bipolar disorder.   7.  History of nephrolithiasis.   8.  Depression.   9.  ADHD.   10.  Impaired fasting glucose.      PAST SURGICAL HISTORY:   1.  Tonsillectomy.   2.  Lithotripsy and stent placement.   3.  Shockwave lithotripsy, cystoscopy, stent placement, patient has had multiple procedures for this.      ALLERGIES:  No known drug allergies.      PRIOR TO ADMISSION MEDICATIONS:   No prescriptions prior to admission.          SOCIAL HISTORY:  Patient says he has not consumed alcohol in 4 years.  He denies any drug use.  He is a former smoker who quit 1 year ago.  Prior to this, he reports he smoked approximately 1 pack every 3 days for most with his life.  He works as a .      FAMILY HISTORY:  Mother has family history of hypertension and hyperlipidemia.  His grandfather had coronary artery disease with MI in his early 50s.      LABORATORY DATA:  BMP is within normal limits with creatinine of 0.81.  CBC is within normal limits with hemoglobin of 15.4 and platelets of 227.  Blood  sugar today is 129.  Troponin 0.031.  A stress test completed on 02/21 shows EF of 55-60% with 1 mm of ST segment depression in inferolateral leads.  There are stress-induced wall motion abnormalities consistent with ischemia in anteroseptal and apical walls concerning for significant LAD lesion.  Chest x-ray is negative for acute pathology.      PHYSICAL EXAMINATION:   VITAL SIGNS:  Temperature 97.9, heart rate 98, blood pressure 138/95, respiratory rate 16, oxygen saturation is 95% on room air.   GENERAL:  Alert and oriented male sitting up in bed, appears comfortable overall, though anxious and tearful at times surrounding present situation.  He is appropriately conversant.   EYES:  Pupils are equal and reactive to light, EOMI.   ENT:  Mucous membranes are moist.   CARDIOVASCULAR:  Regular rate and rhythm, no murmurs appreciated.   RESPIRATORY:  Lungs are clear to auscultation bilaterally, no increased work of breathing or wheezing.   GASTROINTESTINAL:  Positive bowel sounds.  Abdomen is soft, nontender to palpation.   EXTREMITIES:  Warm and well perfused.  No lower extremity edema.   NEUROLOGIC:  Cranial nerves II-XII are grossly intact without focal deficits.   SKIN:  Warm and dry.      ASSESSMENT AND PLAN:  Cecil Vasquez is a 46-year-old male with a past medical history significant for hypertension, hyperlipidemia, obesity as above, bipolar disorder, history of nephrolithiasis, impaired fasting glucose and trigeminal neuralgia who is a direct admission to [facility] from [place] [facility] for management of unstable angina.   1.  Unstable angina.  The patient has been experiencing chest pain associated with shortness of breath, diaphoresis and jaw pain for the past 3-4 weeks.  This discomfort was initially exertional and has become progressively more easily provoked and is now occurring also at rest.  He underwent a stress echocardiogram on the morning of admission which was  positive, indicating extensive anteroseptal/apical ischemia concerning for significant left anterior descending lesion.  His troponin today is normal at 0.031.  Prior to transfer, he was initiated on heparin drip and nitroglycerin drip and is presently hemodynamically stable and chest pain free.   -- Cardiology consulted, plan for coronary angiography.   -- Will initiate a beta blocker, metoprolol 12.5 mg b.i.d., as well as lisinopril 10 mg daily.   -- Start Crestor 40 mg daily.   -- Continue daily aspirin.   -- Continue heparin drip and nitroglycerin drip.   -- Monitor patient on telemetry.   -- Continue to monitor troponin.   -- Patient to remain n.p.o. until angiogram is scheduled.   2.  Hypertension.  The patient believes he was on metoprolol prior to admission though this is not available on any of his medication lists.  For now, will initiate metoprolol 12.5 mg b.i.d., as well as lisinopril 10 mg daily.  P.r.n. hydralazine is available if needed.   3.  Hyperlipidemia.  Fasting lipid panel is ordered for the morning.  Crestor has been initiated as above.   4.  Mild persistent asthma.  Will have albuterol available p.r.n.   5.  Trigeminal neuralgia.  The patient reports he has been off of his gabapentin, oxycodone and tramadol for several weeks as he was concerned this was contributing to his chest pain.  Can consider reordering these medications once verified by pharmacy if patient is having poorly controlled pain.   6.  Deep venous thrombosis prophylaxis.  Heparin drip.   7.  Code status.  This was discussed with the patient and his significant other at bedside.  The patient prefers to be DO NOT RESUSCITATE, though he would be okay with intubation.  We did discuss that if needed he would be resuscitated during catheterization.  Patient is agreeable to this.      The patient was seen and examined with Dr. Catalino Reeves, who agrees with the above plan.         CATALINO REEVES MD       As dictated by LI LI  AURORA DIALLO            D: 2017 14:34   T: 2017 15:18   MT: TS      Name:     CECILIO BLANCO   MRN:      [id number]        Account:      [id number]   :      1970           Admitted:     [id number]      Document: [id number]       cc: Gonzalo Cole MD

## 2017-02-21 NOTE — CONSULTS
Patient seen and examined, echo results and history reviewed. I discussed the indications risks and benefits of cor angio with the patient in detail who is agreeable with proceeding.   - check FLP in AM  - start crestor 40 MG daily (LDL was 200 when measured in 2015)

## 2017-02-22 ENCOUNTER — APPOINTMENT (OUTPATIENT)
Dept: OCCUPATIONAL THERAPY | Facility: CLINIC | Age: 47
DRG: 247 | End: 2017-02-22
Attending: INTERNAL MEDICINE
Payer: COMMERCIAL

## 2017-02-22 VITALS
OXYGEN SATURATION: 94 % | HEIGHT: 69 IN | BODY MASS INDEX: 38.07 KG/M2 | DIASTOLIC BLOOD PRESSURE: 83 MMHG | RESPIRATION RATE: 20 BRPM | WEIGHT: 257.06 LBS | HEART RATE: 98 BPM | SYSTOLIC BLOOD PRESSURE: 123 MMHG | TEMPERATURE: 97.8 F

## 2017-02-22 LAB
ANION GAP SERPL CALCULATED.3IONS-SCNC: 9 MMOL/L (ref 3–14)
BUN SERPL-MCNC: 23 MG/DL (ref 7–30)
CALCIUM SERPL-MCNC: 8.2 MG/DL (ref 8.5–10.1)
CHLORIDE SERPL-SCNC: 102 MMOL/L (ref 94–109)
CHOLEST SERPL-MCNC: 257 MG/DL
CO2 SERPL-SCNC: 25 MMOL/L (ref 20–32)
CREAT SERPL-MCNC: 0.96 MG/DL (ref 0.66–1.25)
ERYTHROCYTE [DISTWIDTH] IN BLOOD BY AUTOMATED COUNT: 13.5 % (ref 10–15)
GFR SERPL CREATININE-BSD FRML MDRD: 84 ML/MIN/1.7M2
GLUCOSE SERPL-MCNC: 100 MG/DL (ref 70–99)
HCT VFR BLD AUTO: 41 % (ref 40–53)
HDLC SERPL-MCNC: 23 MG/DL
HGB BLD-MCNC: 14.3 G/DL (ref 13.3–17.7)
LDLC SERPL CALC-MCNC: ABNORMAL MG/DL
LMWH PPP CHRO-ACNC: NORMAL IU/ML
MCH RBC QN AUTO: 30.5 PG (ref 26.5–33)
MCHC RBC AUTO-ENTMCNC: 34.9 G/DL (ref 31.5–36.5)
MCV RBC AUTO: 87 FL (ref 78–100)
NONHDLC SERPL-MCNC: 234 MG/DL
PLATELET # BLD AUTO: 224 10E9/L (ref 150–450)
POTASSIUM SERPL-SCNC: 4.5 MMOL/L (ref 3.4–5.3)
RBC # BLD AUTO: 4.69 10E12/L (ref 4.4–5.9)
SODIUM SERPL-SCNC: 136 MMOL/L (ref 133–144)
TRIGL SERPL-MCNC: 2016 MG/DL
WBC # BLD AUTO: 10.5 10E9/L (ref 4–11)

## 2017-02-22 PROCEDURE — 80048 BASIC METABOLIC PNL TOTAL CA: CPT | Performed by: PHYSICIAN ASSISTANT

## 2017-02-22 PROCEDURE — 25000132 ZZH RX MED GY IP 250 OP 250 PS 637: Performed by: INTERNAL MEDICINE

## 2017-02-22 PROCEDURE — 80061 LIPID PANEL: CPT | Performed by: PHYSICIAN ASSISTANT

## 2017-02-22 PROCEDURE — 36415 COLL VENOUS BLD VENIPUNCTURE: CPT | Performed by: PHYSICIAN ASSISTANT

## 2017-02-22 PROCEDURE — 99232 SBSQ HOSP IP/OBS MODERATE 35: CPT | Performed by: INTERNAL MEDICINE

## 2017-02-22 PROCEDURE — 99239 HOSP IP/OBS DSCHRG MGMT >30: CPT | Performed by: HOSPITALIST

## 2017-02-22 PROCEDURE — 97535 SELF CARE MNGMENT TRAINING: CPT | Mod: GO

## 2017-02-22 PROCEDURE — 97110 THERAPEUTIC EXERCISES: CPT | Mod: GO

## 2017-02-22 PROCEDURE — 93005 ELECTROCARDIOGRAM TRACING: CPT

## 2017-02-22 PROCEDURE — 97165 OT EVAL LOW COMPLEX 30 MIN: CPT | Mod: GO

## 2017-02-22 PROCEDURE — 85027 COMPLETE CBC AUTOMATED: CPT | Performed by: PHYSICIAN ASSISTANT

## 2017-02-22 PROCEDURE — 40000133 ZZH STATISTIC OT WARD VISIT

## 2017-02-22 PROCEDURE — 25000132 ZZH RX MED GY IP 250 OP 250 PS 637: Performed by: PHYSICIAN ASSISTANT

## 2017-02-22 PROCEDURE — 93010 ELECTROCARDIOGRAM REPORT: CPT | Performed by: INTERNAL MEDICINE

## 2017-02-22 PROCEDURE — 25000132 ZZH RX MED GY IP 250 OP 250 PS 637: Performed by: HOSPITALIST

## 2017-02-22 RX ORDER — PRASUGREL 10 MG/1
TABLET, FILM COATED ORAL
Qty: 36 TABLET | Refills: 0 | Status: SHIPPED | OUTPATIENT
Start: 2017-02-22 | End: 2017-03-17

## 2017-02-22 RX ORDER — LISINOPRIL 10 MG/1
10 TABLET ORAL DAILY
Qty: 30 TABLET | Refills: 0 | Status: SHIPPED | OUTPATIENT
Start: 2017-02-22 | End: 2017-03-17

## 2017-02-22 RX ORDER — ALBUTEROL SULFATE 90 UG/1
AEROSOL, METERED RESPIRATORY (INHALATION)
Qty: 18 G | Refills: 0 | Status: SHIPPED | OUTPATIENT
Start: 2017-02-22 | End: 2017-08-31

## 2017-02-22 RX ORDER — METOPROLOL TARTRATE 25 MG/1
12.5 TABLET, FILM COATED ORAL 2 TIMES DAILY
Qty: 30 TABLET | Refills: 0 | Status: SHIPPED | OUTPATIENT
Start: 2017-02-22 | End: 2017-02-28

## 2017-02-22 RX ORDER — ROSUVASTATIN CALCIUM 40 MG/1
40 TABLET, COATED ORAL DAILY
Qty: 30 TABLET | Refills: 0 | Status: SHIPPED | OUTPATIENT
Start: 2017-02-22 | End: 2017-03-17

## 2017-02-22 RX ORDER — TRAMADOL HYDROCHLORIDE 50 MG/1
50-100 TABLET ORAL EVERY 6 HOURS PRN
Qty: 30 TABLET | Refills: 0 | Status: SHIPPED | OUTPATIENT
Start: 2017-02-22 | End: 2017-02-28

## 2017-02-22 RX ADMIN — OXYCODONE HYDROCHLORIDE 10 MG: 5 TABLET ORAL at 12:44

## 2017-02-22 RX ADMIN — LISINOPRIL 10 MG: 10 TABLET ORAL at 08:14

## 2017-02-22 RX ADMIN — OXYCODONE HYDROCHLORIDE 10 MG: 5 TABLET ORAL at 04:01

## 2017-02-22 RX ADMIN — GABAPENTIN 800 MG: 400 CAPSULE ORAL at 12:40

## 2017-02-22 RX ADMIN — ACETAMINOPHEN 650 MG: 325 TABLET, FILM COATED ORAL at 04:01

## 2017-02-22 RX ADMIN — METOPROLOL TARTRATE 12.5 MG: 25 TABLET, FILM COATED ORAL at 08:14

## 2017-02-22 RX ADMIN — ALUMINUM HYDROXIDE, MAGNESIUM HYDROXIDE, AND DIMETHICONE 30 ML: 400; 400; 40 SUSPENSION ORAL at 11:14

## 2017-02-22 RX ADMIN — GABAPENTIN 800 MG: 400 CAPSULE ORAL at 08:14

## 2017-02-22 RX ADMIN — TICAGRELOR 90 MG: 90 TABLET ORAL at 08:14

## 2017-02-22 RX ADMIN — ACETAMINOPHEN 650 MG: 325 TABLET, FILM COATED ORAL at 12:44

## 2017-02-22 RX ADMIN — TRAMADOL HYDROCHLORIDE 100 MG: 50 TABLET, COATED ORAL at 08:32

## 2017-02-22 RX ADMIN — ASPIRIN 81 MG: 81 TABLET, COATED ORAL at 08:14

## 2017-02-22 ASSESSMENT — ACTIVITIES OF DAILY LIVING (ADL): PREVIOUS_RESPONSIBILITIES: MEAL PREP;HOUSEKEEPING;LAUNDRY;SHOPPING;YARDWORK;MEDICATION MANAGEMENT;FINANCES;DRIVING;WORK

## 2017-02-22 ASSESSMENT — PAIN DESCRIPTION - DESCRIPTORS
DESCRIPTORS: JABBING
DESCRIPTORS: JABBING;SHOOTING

## 2017-02-22 NOTE — PROGRESS NOTES
"Cardiology Progress Note          Assessment and Plan:        1) Unstable angina s/p PHYLLIS to LAD yesterday. Has 50-60% lesion in distal LAD  2) Significant dyslipidemia  3) HTN  4) Obesity    Plan  - OK to d/c from my standpoint if no issues with rehab  - can return to work Monday  - started on crestor but I anticipate the need for either fenofibrate or prescription fish oil for further TG reduction  - Follow up arranged in our [place] office on  at 10:40 AM with Rose Jacobo PA-C.  - insurance coverage is superior for effient. Therefore have recommended loading dose of 60 MG of effient tomorrow (d/c brilinta at that time) and starting effient 10 MG daily afterwards.          Interval History:     No CP or SOB. Mild discomfort at right radial access site.                Review of Systems:   As per subjective, otherwise 5 systems reviewed and negative.           Physical Exam:   Blood pressure 113/77, pulse 98, temperature 97.9  F (36.6  C), temperature source Oral, resp. rate 20, height 1.753 m (5' 9\"), weight 116.6 kg (257 lb 0.9 oz), SpO2 94 %.      Vital Sign Ranges  Temperature Temp  Av.9  F (36.6  C)  Min: 97.4  F (36.3  C)  Max: 98.4  F (36.9  C)   Blood pressure Systolic (24hrs), Av , Min:88 , Max:149        Diastolic (24hrs), Av, Min:54, Max:116      Pulse Pulse  Av  Min: 98  Max: 107   Respirations Resp  Avg: 15.9  Min: 7  Max: 29   Pulse oximetry SpO2  Av.6 %  Min: 94 %  Max: 98 %         Intake/Output Summary (Last 24 hours) at 17 0829  Last data filed at 17 2100   Gross per 24 hour   Intake           533.75 ml   Output                0 ml   Net           533.75 ml       Constitutional: NAD  Skin: Warm and dry  Neck: No JVD  Lungs: CTA  Cardiovascular: RRR, no m/r/g  Abdomen: Soft, non tender.  Extremities and Back: No LE edema, small hematoma at right radial access site.   Neurological: Nonfocal           Medications:     I have reviewed this patient's " current medications.              Data:     Labs reviewed.     Tele: LIZA Francisco M.D.

## 2017-02-22 NOTE — PROGRESS NOTES
Patient had some SS chest discomfort while belching that resolved with Maalox. His EKG was unchanged and he subsequently performed cardiac rehab without any recurrence.

## 2017-02-22 NOTE — PLAN OF CARE
Problem: Discharge Planning  Goal: Discharge Planning (Adult, OB, Behavioral, Peds)  A&OX4, C/o chronic right facial pain and prn oxycodone, tramadol and Tylenol given with good effect. Had x1 episode of chest pain/pressure and was relieved by Maalox and cardiology was notified. Right radial site is mildly swollen with CMS intact and checked MD. Tolerated cardiac rehab well. Vss, Teli SR. Discharge instruction reviewed with pt and all questions answered. Pt expressed understanding of all discharge instructions and follow ups. Pt was discharged home with his significant other at 1530.

## 2017-02-22 NOTE — PROVIDER NOTIFICATION
pt c/o chest pain 7/10, EKG done with no changes. pain resolved with Maalox. Dr. Francisco text paged.

## 2017-02-22 NOTE — PROGRESS NOTES
02/22/17 1100   Quick Adds   Type of Visit Initial Occupational Therapy Evaluation   Living Environment   Lives With other (see comments)  (lives with 3 roommates)   Living Arrangements house  (split entry)   Home Accessibility stairs to enter home;stairs within home   Number of Stairs to Enter Home 5   Number of Stairs Within Home 5   Transportation Available car;family or friend will provide   Living Environment Comment Pt lives with roommates in split entry house, 5 steps up/down.    Self-Care   Usual Activity Tolerance good   Current Activity Tolerance moderate   Regular Exercise no   Equipment Currently Used at Home none   Functional Level Prior   Ambulation 0-->independent   Transferring 0-->independent   Toileting 0-->independent   Bathing 0-->independent   Dressing 0-->independent   Eating 0-->independent   Communication 0-->understands/communicates without difficulty   Swallowing 0-->swallows foods/liquids without difficulty   Cognition 0 - no cognition issues reported   Fall history within last six months no   Which of the above functional risks had a recent onset or change? none   Prior Functional Level Comment Independent in all ADLs, IADLs and mobility tasks at baseline   General Information   Onset of Illness/Injury or Date of Surgery - Date 02/21/17   Referring Physician Jamar Solis MD   Patient/Family Goals Statement Pt's goal is to d/c home with OP CR   Additional Occupational Profile Info/Pertinent History of Current Problem Per chart: Cecil Vasquez is a 46-year-old male with a past medical history significant for hypertension, hyperlipidemia, obesity as above, bipolar disorder, history of nephrolithiasis, impaired fasting glucose and trigeminal neuralgia who is a direct admission to [facility] from [place] [facility] for management of unstable angina, now s/p PHYLLIS   Precautions/Limitations other (see comments)  (cardiac precautions)   General Observations Upon  start of session, pt had chest pain, RN notified, stat EKG completed, RN gave Maalox. Normal EKG, med eliminated pain. RN ok'd session.    General Info Comments /89, HR 80 at rest   Cognitive Status Examination   Orientation orientation to person, place and time   Level of Consciousness alert   Able to Follow Commands WNL/WFL   Personal Safety (Cognitive) WNL/WFL   Memory intact   Visual Perception   Visual Perception No deficits were identified   Sensory Examination   Sensory Comments Pt denies sensory impairments in BUEs   Pain Assessment   Patient Currently in Pain No   Range of Motion (ROM)   ROM Quick Adds No deficits were identified   Strength   Manual Muscle Testing Quick Adds No deficits were identified   Hand Strength   Hand Strength Comments WFL   Coordination   Upper Extremity Coordination No deficits were identified   Mobility   Bed Mobility Comments Independent   Transfer Skills   Transfer Comments Independent in all transfers   Upper Body Dressing   Level of [place]: Dress Upper Body independent   Lower Body Dressing   Level of [place]: Dress Lower Body independent   Toileting   Level of [place]: Toilet independent   Grooming   Level of [place]: Grooming independent   Instrumental Activities of Daily Living (IADL)   Previous Responsibilities meal prep;housekeeping;laundry;shopping;yardwork;medication management;finances;driving;work   IADL Comments Pt has support of roommates if needed   Activities of Daily Living Analysis   Impairments Contributing to Impaired Activities of Daily Living strength decreased  (functional activity tolerance)   General Therapy Interventions   Planned Therapy Interventions ADL retraining;transfer training;home program guidelines;progressive activity/exercise;risk factor education   Clinical Impression   Criteria for Skilled Therapeutic Interventions Met yes, treatment indicated   OT Diagnosis Impaired IADLs, mobility   Influenced by the following  "impairments Decreased functional activity tolerance, cardiac risk factors   Assessment of Occupational Performance 1-3 Performance Deficits   Identified Performance Deficits ambulation, homemaking tasks, work   Clinical Decision Making (Complexity) Low complexity   Therapy Frequency 2 times/day   Predicted Duration of Therapy Intervention (days/wks) 2 days   Anticipated Discharge Disposition Home with Outpatient Therapy  (OP CR at [facility])   Risks and Benefits of Treatment have been explained. Yes   Patient, Family & other staff in agreement with plan of care Yes   [facility] AM-PAC  \"6 Clicks\" Daily Activity Inpatient Short Form   1. Putting on and taking off regular lower body clothing? 4 - None   2. Bathing (including washing, rinsing, drying)? 4 - None   3. Toileting, which includes using toilet, bedpan or urinal? 4 - None   4. Putting on and taking off regular upper body clothing? 4 - None   5. Taking care of personal grooming such as brushing teeth? 4 - None   6. Eating meals? 4 - None   Daily Activity Raw Score (Score out of 24.Lower scores equate to lower levels of function) 24   Total Evaluation Time   Total Evaluation Time (Minutes) 10     "

## 2017-02-22 NOTE — CONSULTS
NUTRITION EDUCATION    REASON FOR ASSESSMENT:  Nutrition education on American Heart Association (AHA) Heart Healthy Diet.    NUTRITION HISTORY:  Information obtained from Patient    Patient currently eats 1 meal per day and eats out at least 4 times per weeks. It the patient has breakfast, it will be at a fast food restaurant. PTA the patient went to [employer] and had a burger, fries and a milkshake. Reflecting on dietary habits, the patient is very motivated to make changes in his eating habits to support his heart health    CURRENT DIET ORDER:  Low saturated fat, <2400mg Na diet    NUTRITION DIAGNOSIS:  Food- and nutrition-related knowledge deficit R/t limited previous education as evidence by patient questions    INTERVENTIONS:  Nutrition Prescription:    Recommended AHA Heart Healthy Diet    Implementation:     Nutrition Education (Content):  a) reviewed the Nutrition chapter in the  Understanding Artery Disease  binder  b) reviewed AHA Heart Healthy Diet guidelines  c) provided additional Healthy Heart diet handouts    Nutrition Education (Application):  a) Discussed current eating habits and recommended alternative food choices    Anticipate good compliance    Diet Education - refer to Education flowsheet    Goals:    Patient verbalizes understanding of diet by asking appropriate questions    All of the above goals met during education session    Follow Up/Monitoring:    Provided RD contact information for future questions    Adeline Cuevas  Dietetic Intern  Cardiac/Oncology Pager: [phone]

## 2017-02-22 NOTE — PHARMACY
Brilinta not on formulary - PA required    Effient covered at $0 / month    Thank you,  Sawyer Duarte CPhT  [facility] Discharge Pharmacy Liaison  [phone]

## 2017-02-22 NOTE — DISCHARGE SUMMARY
[facility]    Discharge Summary  Hospitalist    Date of Admission:  2/21/2017  Date of Discharge:  2/22/2017  4:22 PM  Discharging Provider: JoAnna K. Barthell, PA-C    Discharge Diagnoses   1. ACS (acute coronary syndrome) (H)  2. Coronary artery disease involving native coronary artery of native heart with unstable angina pectoris (H)  3. Essential hypertension with goal blood pressure less than 140/90  4. Trigeminal Neuralgia    History of Present Illness   Cecil Vasquez is a 46-year-old male with a past medical history significant for bipolar disorder, history of nephrolithiasis, trigeminal neuralgia, impaired fasting glucose,  hypertension, hyperlipidemia, and obesity who was a directly admitted to [facility] from [facility] with progressive-unstable angina and an early positive outpatient stress test with extensive anteroseptal apical ischemia.    Several recent outpatient visits with CP and associated SOB, diaphoresis, shortness of breath, and jaw pain increasing in frequency, severity, and with rest x 3-4 weeks. Undetectable trop 2/13; 0.036 on 2/19    Please see H&P by Inocencia Sibley PA-C and cardiology consultation by Amish Wallace from 2/21/2017 for complete details.    Hospital Course   Cecil Vasquez was admitted on 2/21/2017.  The following problems were addressed during his hospitalization:    1. Unstable angina:  Early positive out-patient stress test at [facility] indicating extensive anteroseptal/apical ischemia. Several recent outpatient visits with CP and associated SOB, diaphoresis, shortness of breath, and jaw pain increasing in frequency, severity, and with rest x 3-4 weeks. Undetectable trop 2/13, 0.036 on 2/19. This adm serial trops 0.031/0.075/0.074. Some medication non-compliance recognized on admission and importance of new medication management impressed upon patient with his agreement and understanding.  -- Cardiology consulted and coronary  angiography with proximal LAD 99% stenosed s/p PHYLLIS to proximal LAD. Moderate (56% stenosis) distal RCA lesion which was nonflow limiting based on FFR 0.90  -- Dual anti-platelet therapy with ASA 81mg daily and Effient.  Please note: initial plan was for Brilinta and patient was loaded with Brilinta 180mg during PCI; however due to insurance coverage will transition to Effient. He received first dose of maintenance Brilinta 90 mg BID on day of discharge and will take second dose evening of discharge. Then on Thursday, 2/23 will load with Effient 60mg and start maintenance Effient 10mg daily on Friday 2/24 as discussed with cardiology Dr. Dave Francisco.  -- Beta-blocker/ACE-i/statin therapy initiated as below on 2/21  -- Further risk factor reduction as outpatient. To follow up with Dr. Cole on 2/28 and cardiology follow up in [place] office 3/23    Essential HTN: patient informs SBPs prior to admission in 190s. Well controlled since started BB and ACE-i. Medications initiated 2/21. Some medication non-compliance recognized on admission and importance of new medication management impressed upon patient who is quite motivated and understanding of importance after above event.  -- Metoprolol 12.5mg BID  -- Lisinopril 10mg daily    Dyslipidemia: Fasting lipid panel as follows -, HDL 23, LDL unable to estimate, non-, TG 2016. Medications initiated 2/21. Some medication non-compliance recognized on admission and importance of new medication management impressed upon patient who is quite motivated and understanding of importance after above event.  -- Crestor 40 mg daily.  -- anticipate the need for either fenofibrate or prescription fish oil for further TG reduction per cardiology    Obesity: BMI 37.96  -- Patient apparently hired a  the day of discharge  -- Defer ongoing management to Dr. Cole    Trigeminal neuralgia: Chronic x 2 years and active  -- Continue PTA gabapentin, tramadol,  and oxycodone  -- Agreed to limited tramadol prescription as patient is currently out. Will need further pain medication prescriptions from primary and patient easily agreeable    Mild persistent asthma: no symptoms of exacerbation.  - PRN albuterol made available    This patient was discussed with Dr. Reeves of the Hospitalist Service who agrees with current plans as outlined above    JoAnna K. Barthell, PA-C    Significant Results and Procedures     Pending Results   Unresulted Labs Ordered in the Past 30 Days of this Admission     No orders found for last 61 day(s).        Code Status   OK for intubation  Preferred DNR this admission    Primary Care Physician   Gonzalo Cole    Physical Exam   Temp: 97.9  F (36.6  C) Temp src: Oral BP: 113/77 Pulse: 98 Heart Rate: 79 Resp: 20 SpO2: 94 % O2 Device: None (Room air)    Vitals:    02/21/17 1410 02/22/17 0500   Weight: 111.4 kg (245 lb 9.5 oz) 116.6 kg (257 lb 0.9 oz)     Vital Signs with Ranges  Temp:  [97.4  F (36.3  C)-97.9  F (36.6  C)] 97.9  F (36.6  C)  Pulse:  [] 98  Heart Rate:  [] 79  Resp:  [7-29] 20  BP: ()/(54-98) 113/77  SpO2:  [94 %-98 %] 94 %  I/O last 3 completed shifts:  In: 533.75 [P.O.:240; I.V.:293.75]  Out: -   Constitutional: Pleasant well-nourished male who appears stated age, no acute distress.  Respiratory: Breath sounds CTA. No wheezing, crackles, or rhonchi.  Cardiovascular: RRR, no rub or murmur. No peripheral edema.  GI: Soft, non-tender, non-distended. Bowel sounds present.  Skin/Integumen: Warm, dry, no rashes or lesions. Previously demarcated right radial hematoma is soft and non-tender.    Discharge Disposition   Discharged to home  Condition at discharge: Stable    Consultations This Hospital Stay   CARDIOLOGY IP CONSULT  PHARMACY TO DOSE HEPARIN  PHARMACY TO DOSE HEPARIN  CARDIAC REHAB IP CONSULT  NUTRITION SERVICES ADULT IP CONSULT  PHARMACY IP CONSULT  PHARMACY IP CONSULT  NUTRITION SERVICES ADULT IP  CONSULT  SMOKING CESSATION PROGRAM IP CONSULT  SMOKING CESSATION PROGRAM IP CONSULT    Time Spent on this Encounter   I, JoAnna K. Barthell, personally saw the patient today and spent greater than 30 minutes discharging this patient.    Discharge Orders     Lipid Profile     Follow-Up with Cardiac Advanced Practice Provider     Cardiac Rehab Referral     Reason for your hospital stay   Chest pain and symptoms during stress test concerning for diminished blood flow to your heart - essentially a heart attack.     Follow-up and recommended labs and tests    Follow up with primary care provider, Gonzalo Cole, within 7 days for hospital follow- up.  The following labs/tests are recommended: BMP and CBC.     Activity   Your activity upon discharge: activity as tolerated     Monitor and record   blood pressure daily     Full Code     Diet   Follow this diet upon discharge:    Combination Diet Low Saturated Fat Na <2400mg Diet       Discharge Medications   Current Discharge Medication List      START taking these medications    Details   !! aspirin EC 81 MG EC tablet Take 1 tablet (81 mg) by mouth daily  Qty: 120 tablet, Refills: 0    Associated Diagnoses: Coronary artery disease involving native coronary artery of native heart with unstable angina pectoris (H)      lisinopril (PRINIVIL/ZESTRIL) 10 MG tablet Take 1 tablet (10 mg) by mouth daily  Qty: 30 tablet, Refills: 0    Associated Diagnoses: Essential hypertension with goal blood pressure less than 140/90; ACS (acute coronary syndrome) (H)      metoprolol (LOPRESSOR) 25 MG tablet Take 0.5 tablets (12.5 mg) by mouth 2 times daily  Qty: 30 tablet, Refills: 0    Associated Diagnoses: ACS (acute coronary syndrome) (H); Essential hypertension with goal blood pressure less than 140/90      rosuvastatin (CRESTOR) 40 MG tablet Take 1 tablet (40 mg) by mouth daily  Qty: 30 tablet, Refills: 0    Associated Diagnoses: ACS (acute coronary syndrome) (H)      ticagrelor  (BRILINTA) 90 MG tablet Take 1 tablet at 8pm on Wednesday 2/22/2017.  Qty: 1 tablet, Refills: 0    Associated Diagnoses: Coronary artery disease involving native coronary artery of native heart with unstable angina pectoris (H); ACS (acute coronary syndrome) (H)      prasugrel (EFFIENT) 10 MG TABS tablet Take 6 tabs PO on Thursday, 2/23/2017. Starting Friday, 2/24 take 1 tab PO daily.  Qty: 36 tablet, Refills: 0    Associated Diagnoses: ACS (acute coronary syndrome) (H); Coronary artery disease involving native coronary artery of native heart with unstable angina pectoris (H)       !! - Potential duplicate medications found. Please discuss with provider.      CONTINUE these medications which have CHANGED    Details   traMADol (ULTRAM) 50 MG tablet Take 1-2 tablets ( mg) by mouth every 6 hours as needed for moderate pain  Qty: 30 tablet, Refills: 0    Associated Diagnoses: Neuropathy (H)         CONTINUE these medications which have NOT CHANGED    Details   !! Gabapentin (NEURONTIN PO) Take 800 mg by mouth 2 times daily Am & lunchtime.      !! Gabapentin (NEURONTIN PO) Take 1,200 mg by mouth At Bedtime      !! aspirin 81 MG EC tablet Take 1 tablet (81 mg) by mouth daily  Qty: 90 tablet, Refills: 3    Associated Diagnoses: Acute chest pain      oxyCODONE (ROXICODONE) 5 MG IR tablet Take 1-2 tablets (5-10 mg) by mouth every 6 hours as needed for moderate to severe pain 30 day supply  Qty: 30 tablet, Refills: 0    Associated Diagnoses: Neuropathy (H)      albuterol (ALBUTEROL) 108 (90 BASE) MCG/ACT Inhaler Inhale 2 puffs into the lungs every 4 hours as needed for shortness of breath / dyspnea  Qty: 1 Inhaler, Refills: 0    Associated Diagnoses: Asthma with acute exacerbation, unspecified asthma severity      ipratropium - albuterol 0.5 mg/2.5 mg/3 mL (DUONEB) 0.5-2.5 (3) MG/3ML neb solution Take 1 vial (3 mLs) by nebulization every 4 hours as needed for shortness of breath / dyspnea  Qty: 30 vial, Refills: 0     Associated Diagnoses: Asthma with acute exacerbation, unspecified asthma severity      acetaminophen (TYLENOL) 325 MG tablet Take 2 tablets (650 mg) by mouth every 4 hours as needed for mild pain  Qty: 100 tablet, Refills: 11    Associated Diagnoses: Trigeminal neuralgia      melatonin 3 MG tablet Take 1 tablet (3 mg) by mouth nightly as needed for sleep  Qty: 30 tablet, Refills: 1    Associated Diagnoses: Trigeminal neuralgia       !! - Potential duplicate medications found. Please discuss with provider.      STOP taking these medications       Metoprolol Succinate (TOPROL XL PO) Comments:   Reason for Stopping:             Allergies   Allergies   Allergen Reactions     No Known Drug Allergies      Data   Most Recent 3 CBC's:  Recent Labs   Lab Test  02/22/17   0521  02/21/17   1430  02/21/17   1040   WBC  10.5  10.1  9.2   HGB  14.3  14.1  15.4   MCV  87  84  84   PLT  224  223  227      Most Recent 3 BMP's:  Recent Labs   Lab Test  02/22/17   0521  02/21/17   1040  02/19/17   1750   NA  136  139  143   POTASSIUM  4.5  4.5  4.0   CHLORIDE  102  104  107   CO2  25  22  24   BUN  23  21  16   CR  0.96  0.81  0.92   ANIONGAP  9  13  12   LÁZARO  8.2*  8.9  8.8   GLC  100*  129*  138*     Most Recent 2 LFT's:  Recent Labs   Lab Test  02/13/17   1918  09/29/16   1120   AST  20  18   ALT  33  34   ALKPHOS  79  69   BILITOTAL  0.2  0.4     Most Recent INR's and Anticoagulation Dosing History:  Anticoagulation Dose History     There is no flowsheet data to display.        Most Recent 3 Troponin's:  Recent Labs   Lab Test  02/21/17   1928  02/21/17   1430  02/21/17   1040   TROPI  0.074*  0.075*  0.031     Most Recent Cholesterol Panel:  Recent Labs   Lab Test  02/22/17   0521   CHOL  257*   LDL  Cannot estimate LDL when triglyceride exceeds 400 mg/dL   HDL  23*   TRIG  2016*     Most Recent 6 Bacteria Isolates From Any Culture (See EPIC Reports for Culture Details):  Recent Labs   Lab Test  04/02/16   2350   CULT  No growth      Most Recent TSH, T4 and A1c Labs:  Recent Labs   Lab Test  12/18/15   1110  01/13/15   1045   TSH   --   1.96   A1C  6.1*   --      Results for orders placed or performed during the hospital encounter of 02/19/17   XR Chest Port 1 View    Narrative    CHEST ONE VIEW PORTABLE   2/19/2017 7:02 PM     HISTORY: Chest pain    COMPARISON: 2/13/2017      Impression    IMPRESSION: Normal. No change.    ERENDIRA BARTHOLOMEW MD

## 2017-02-22 NOTE — DISCHARGE INSTRUCTIONS
Due to insurance coverage we will be switching you from Brilinta to Effient.  - This means you will take 1 dose of Brilinta tonight at 8pm.  - On Thursday you will take 6 tablets of Effient.  - On Friday and thereafter you will take 1 tablets of Effient daily.    Start other new medications as described on bottles.    Follow up with Dr. Cole as scheduled on 2/28 for close follow up and assessment after starting new medications. Will need to obtain further pain medications if needed from him then. Lab draw prior to appointment.    Follow up with cardiology as scheduled on March 23rd at 10:40 AM with Rose Jacobo PA-C.  Discharge Instructions for Coronary Angioplasty and Stenting  During your angioplasty, a doctor inserts a thin tube called a catheter into a blood vessel in your groin or wrist. The catheter is pushed through your blood vessel to a blocked area in one of your heart s arteries. The doctor inflates a tiny balloon at the tip of the catheter and stretches the blocked vessel so blood can flow freely. The balloon is then deflated and removed with the catheter. The doctor may also insert a metal mesh tube called a stent in the blocked vessel.  The stent helps the vessel stay open.  Home care    Ask someone to drive you to your appointments for the next few days.    Rest for 2 to 3 days after the procedure. Most people are able to resume normal activity within a few days.    Take your temperature and check your incision for signs of infection (redness, swelling, drainage, or warmth) every day for a week. It is normal to have a small bruise or bump where the catheter was inserted.    Take your medications exactly as directed. Don t skip doses. It is important to take aspirin or other similar drugs for as long as your doctor advises.If you were also prescribed clopidogrel, prasugrel, or ticagrelor, it is very important to take these medications, as well. These drugs prevent clots that could cause a heart  attack. If you have a problem with any of your drugs, call your doctor right away.    Unless directed otherwise, stay hydrated to help flush your body of the dye that was used during your angioplasty.    Eat a healthy diet that is low in fat, salt, and cholesterol. Ask your doctor for menus and other diet information.    Exercise according to your doctor's recommendation. Depending on your situation, your doctor may recommend you start a cardiac rehabilitation program.    Avoid swimming or taking baths for 5 to 7 days. You may shower the day after the procedure.  Follow-up care  Make a follow-up appointment as directed by our staff.     When to call your doctor  Call your doctor immediately if you have any of the following:    Chest pain or a return of the symptoms you had prior to the angioplasty    Constant or increasing pain or numbness in your leg, or if your leg looks blue or feels cold    Fever above 100.4 F (38.0 C) or other signs of infection (redness, swelling, drainage, or warmth at the incision site of the leg or wrist)    Shortness of breath    Bleeding, bruising, or a large swelling where the catheter (tube) was inserted    Blood in your urine    Black or tarry stools     5601-3855 [employer]. [address] [place] [address]. All rights reserved. This information is not intended as a substitute for professional medical care. Always follow your healthcare professional's instructions.

## 2017-02-22 NOTE — PROGRESS NOTES
"SPIRITUAL HEALTH SERVICES Progress Note  [facility]    Initial visit per pt request.  Pt shared that he is a \"new Episcopal\" (as of 2 years ago) and invited conversation about living an intentional Ep[employer] life.  Pt said it is great to now face life challenges with the support of his chato and chato community, and pt is also looking to grow in his chato and chato practices.  SH provided spiritual affirmation, nurture and prayer.  Pt anticipates DSC home today and cites no concerns.                                                                                                                                           Georgi Stephen M.Div., [facility]  Staff   Pager [phone]    "

## 2017-02-22 NOTE — PLAN OF CARE
Problem: Goal Outcome Summary  Goal: Goal Outcome Summary  Outcome: Improving  A&o x4.  Vss. Tele- NSR. Pt denies chest pain. Pt reports R sided face pain (chronic), oxy/tylenol/gabapentin given. R TR bands off, hematoma unchanged, stable. Up sba. Saline locked. Plan- cardiac rehab 2/22, continue education.   Dipti David RN

## 2017-02-22 NOTE — PROGRESS NOTES
R TR bands removed. Hematoma noted upon arrival from cath lab. Additional TR band placed proximally, air removed r/t absent pulse. Hematoma unchanged, stable.   Dipti David RN

## 2017-02-22 NOTE — TELEPHONE ENCOUNTER
Albuterol  Routing refill request to provider for review/approval because:  ACT score is <20, was 16 on 12/16/16  Pt had inhaler filled 2/3/17, and is currently hospitalized    Sridhar Klein RN, BSN

## 2017-02-22 NOTE — PLAN OF CARE
Problem: Goal Outcome Summary  Goal: Goal Outcome Summary  Outcome: Improving  Pt rested well, denies chest pain or dyspnea. Pt does c/o facial pain from trigeminal neuralgia relieved by tylenol and oxycodone. Pt remains in SR, VSS, R radial angio site has a small stable hematoma, nurmal radial pulse. Plan cardiac rehab today, continue medication education.

## 2017-02-22 NOTE — PLAN OF CARE
Problem: Goal Outcome Summary  Goal: Goal Outcome Summary  Occupational Therapy and Cardiac Rehab Discharge Summary     Reason for therapy discharge:    Discharged to home with outpatient therapy.     Progress towards therapy goal(s). See goals on Care Plan in [facility] electronic health record for goal details.  Goals partially met.  Barriers to achieving goals:   discharge on same date as initial evaluation.     Therapy recommendation(s):    Continued therapy is recommended.  Rationale/Recommendations:  OP CR to maximize CV functioning.

## 2017-02-22 NOTE — PLAN OF CARE
Problem: Goal Outcome Summary  Goal: Goal Outcome Summary  OT/CR: Order received, eval completed and treatment initiated. Pt is a 46 year old male admitted with unstable angina, now s/p PHYLLIS. Pt lives in split-entry house with roommates, 5 steps up/down, independent in all ADLs, IADLs and mobility tasks at baseline. Currently, independent in ADLs/mobility; pt able to ambulate >270 feet independently, ambulate on treadmill at 1.6-2.0 mph for 15 minutes, completed 10 stairs up/down independently with stable CV response. /89, HR 80 at rest; /77. HR 97 post activity. Anticipate discharge home with OP CR at [facility].

## 2017-02-22 NOTE — PROGRESS NOTES
Hospitalist Service  [facility]   [address] [place], [place] [address]  Answering Service [phone]    To whom it may concern,    Cecil Vasquez was cared for by myself and the Hospitalist service at [facility] from 2/21/2017 to 2/22/2017.  his primary care physician is Gonzalo Cole who can be reached at phone number [phone].    I as his physician assistant recommend return to work on Monday, 2/27/2017 in order to rest prior to returning to work without restrictions.    He can follow up with his primary care physician for adjustment of these recommendations as needed.    Sincerely,         JoAnna K. Barthell, PA-C

## 2017-02-22 NOTE — PROGRESS NOTES
Met with pt. Pt has appts made with PCP, cards, and cardiac rehab set up. Pt stated he feels safe at home and does not need any assistance. Care transitions will continue to follow.

## 2017-02-23 ENCOUNTER — CARE COORDINATION (OUTPATIENT)
Dept: CARDIOLOGY | Facility: CLINIC | Age: 47
End: 2017-02-23

## 2017-02-23 ENCOUNTER — TELEPHONE (OUTPATIENT)
Dept: FAMILY MEDICINE | Facility: CLINIC | Age: 47
End: 2017-02-23

## 2017-02-23 NOTE — PROGRESS NOTES
Called patient and left  to discuss any post hospital d/c questions he may have, review medication changes, and confirm f/u appts. RN advised patient in  that he has an apt scheduled on 3/23/17 with ÓSCAR Rose [place]. Patient advised in  to call clinic with any cardiac related questions or concerns prior to his apt on 3/23/17.

## 2017-02-23 NOTE — TELEPHONE ENCOUNTER
Inpatient Visit Date: 2/22/17, [facility]  Diagnosis / Reason for Visit: Acute Coronary Syndrome

## 2017-02-24 LAB
INTERPRETATION ECG - MUSE: NORMAL
INTERPRETATION ECG - MUSE: NORMAL

## 2017-02-27 RX ORDER — TRAMADOL HYDROCHLORIDE 50 MG/1
TABLET ORAL
Qty: 120 TABLET | Refills: 0 | Status: CANCELLED | OUTPATIENT
Start: 2017-02-27

## 2017-02-27 NOTE — TELEPHONE ENCOUNTER
Received 30 tablets on 2/22/17 when discharged from [facility], was hospitalized for 2 days.  Still waiting for approval on this prescription- would be out of last months prescription and the Rx from [facility] if taking 8 tablets daily.    Victoria Zamora [facility]  [phone]

## 2017-02-28 ENCOUNTER — OFFICE VISIT (OUTPATIENT)
Dept: FAMILY MEDICINE | Facility: CLINIC | Age: 47
End: 2017-02-28
Payer: COMMERCIAL

## 2017-02-28 VITALS
SYSTOLIC BLOOD PRESSURE: 150 MMHG | DIASTOLIC BLOOD PRESSURE: 86 MMHG | HEART RATE: 87 BPM | OXYGEN SATURATION: 100 % | RESPIRATION RATE: 20 BRPM | WEIGHT: 257.2 LBS | BODY MASS INDEX: 37.98 KG/M2 | TEMPERATURE: 97.8 F

## 2017-02-28 DIAGNOSIS — G62.9 NEUROPATHY: ICD-10-CM

## 2017-02-28 DIAGNOSIS — I10 ESSENTIAL HYPERTENSION WITH GOAL BLOOD PRESSURE LESS THAN 140/90: Primary | ICD-10-CM

## 2017-02-28 DIAGNOSIS — I25.110 CORONARY ARTERY DISEASE INVOLVING NATIVE CORONARY ARTERY OF NATIVE HEART WITH UNSTABLE ANGINA PECTORIS (H): ICD-10-CM

## 2017-02-28 PROCEDURE — 99214 OFFICE O/P EST MOD 30 MIN: CPT | Performed by: FAMILY MEDICINE

## 2017-02-28 RX ORDER — HYDROCODONE BITARTRATE AND ACETAMINOPHEN 7.5; 325 MG/1; MG/1
1 TABLET ORAL EVERY 8 HOURS PRN
Qty: 30 TABLET | Refills: 0 | Status: SHIPPED | OUTPATIENT
Start: 2017-02-28 | End: 2017-03-23

## 2017-02-28 RX ORDER — TRAMADOL HYDROCHLORIDE 50 MG/1
50-100 TABLET ORAL EVERY 6 HOURS PRN
Qty: 30 TABLET | Refills: 0 | Status: SHIPPED | OUTPATIENT
Start: 2017-02-28 | End: 2017-03-17

## 2017-02-28 RX ORDER — GABAPENTIN 600 MG/1
TABLET ORAL
Qty: 240 TABLET | Refills: 1 | Status: SHIPPED | OUTPATIENT
Start: 2017-02-28 | End: 2017-04-17

## 2017-02-28 RX ORDER — METOPROLOL SUCCINATE 25 MG/1
25 TABLET, EXTENDED RELEASE ORAL DAILY
Qty: 30 TABLET | Refills: 1 | Status: SHIPPED | OUTPATIENT
Start: 2017-02-28 | End: 2017-02-28

## 2017-02-28 RX ORDER — METOPROLOL SUCCINATE 50 MG/1
50 TABLET, EXTENDED RELEASE ORAL DAILY
Qty: 30 TABLET | Refills: 1 | Status: SHIPPED | OUTPATIENT
Start: 2017-02-28 | End: 2017-03-23

## 2017-02-28 ASSESSMENT — PAIN SCALES - GENERAL: PAINLEVEL: MODERATE PAIN (4)

## 2017-02-28 NOTE — PROGRESS NOTES
SUBJECTIVE:                                                    Cecil Vasquez is a 46 year old male who presents to clinic today for the following health issues:      Chief Complaint   Patient presents with     Other     disucss heart issues and medications with it.      Recently hospitalized and stented to his LAD after abnormal stress test. Feeling well today. Tolerating medications well. No chest pain. Would like to change to long-acting metoprolol. Discussed his diet. He is committed to a Mediterranean style change.    Like to increase his nerve medicine for his neuropathy. He has occipital neuropathy. He is facial pain clinic advised to dose upwards of 5000 mg daily. Vicodin also worked better in the hospital compared to oxycodone.      Problem list and histories reviewed & adjusted, as indicated.  Additional history: as documented        Reviewed and updated as needed this visit by clinical staff  Tobacco  Allergies  Med Hx  Surg Hx  Fam Hx  Soc Hx      Reviewed and updated as needed this visit by Provider         ROS:  Constitutional, HEENT, cardiovascular, pulmonary, gi and gu systems are negative, except as otherwise noted.    OBJECTIVE:                                                    /86 (BP Location: Left arm, Patient Position: Chair, Cuff Size: Adult Regular)  Pulse 87  Temp 97.8  F (36.6  C) (Temporal)  Resp 20  Wt 257 lb 3.2 oz (116.7 kg)  SpO2 100%  BMI 37.98 kg/m2  Body mass index is 37.98 kg/(m^2).  GENERAL: healthy, alert and no distress  NECK: no adenopathy, no asymmetry, masses, or scars and thyroid normal to palpation  RESP: lungs clear to auscultation - no rales, rhonchi or wheezes  CV: regular rate and rhythm, normal S1 S2, no S3 or S4, no murmur, click or rub, no peripheral edema and peripheral pulses strong  ABDOMEN: soft, nontender, no hepatosplenomegaly, no masses and bowel sounds normal  MS: no gross musculoskeletal defects noted, no edema    Diagnostic Test  Results:  none      ASSESSMENT/PLAN:                                                              ICD-10-CM    1. Essential hypertension with goal blood pressure less than 140/90 I10 DISCONTINUED: metoprolol (TOPROL-XL) 25 MG 24 hr tablet   2. cad - stent to LAD 2017 I25.110 metoprolol (TOPROL-XL) 50 MG 24 hr tablet     DISCONTINUED: metoprolol (TOPROL-XL) 25 MG 24 hr tablet   3. Coronary artery disease involving native coronary artery of native heart with unstable angina pectoris (H) I25.110 aspirin 81 MG EC tablet    s/p PHYLLIS to LAD 2/21/2017   4. Neuropathy (H) G62.9 gabapentin (NEURONTIN) 600 MG tablet     HYDROcodone-acetaminophen (NORCO) 7.5-325 MG per tablet     traMADol (ULTRAM) 50 MG tablet     Heart disease discussed. Diet discussed. Medication compliance discussed. Return if symptoms recur. Otherwise I can see him in one month for recheck. In terms of his chronic pain and neuropathy. Increased his Neurontin to 1200 mg twice 3 times daily. Continue to use tramadol. Added Norco to be used once or twice daily with #30 month's supply. I would like to simplify his regimen and stop one of these 2. He agrees. See him back.      Gonzalo Cole MD  [facility]

## 2017-02-28 NOTE — NURSING NOTE
"Chief Complaint   Patient presents with     Other     disucss heart issues and medications with it.        Initial /86 (BP Location: Left arm, Patient Position: Chair, Cuff Size: Adult Regular)  Pulse 87  Temp 97.8  F (36.6  C) (Temporal)  Resp 20  Wt 257 lb 3.2 oz (116.7 kg)  SpO2 100%  BMI 37.98 kg/m2 Estimated body mass index is 37.98 kg/(m^2) as calculated from the following:    Height as of 2/21/17: 5' 9\" (1.753 m).    Weight as of this encounter: 257 lb 3.2 oz (116.7 kg).  Medication Reconciliation: complete  Luiza Austin CMA   "

## 2017-02-28 NOTE — LETTER
My Depression Action Plan  Name: Cecil Vasquez   Date of Birth 1970  Date: 2/28/2017    My doctor: Gonzalo Cole   My clinic: [address] [address] [phone]          GREEN    ZONE   Good Control    What it looks like:     Things are going generally well. You have normal up s and down s. You may even feel depressed from time to time, but bad moods usually last less than a day.   What you need to do:  1. Continue to care for yourself (see self care plan)  2. Check your depression survival kit and update it as needed  3. Follow your physician s recommendations including any medication.  4. Do not stop taking medication unless you consult with your physician first.           YELLOW         ZONE Getting Worse    What it looks like:     Depression is starting to interfere with your life.     It may be hard to get out of bed; you may be starting to isolate yourself from others.    Symptoms of depression are starting to last most all day and this has happened for several days.     You may have suicidal thoughts but they are not constant.   What you need to do:     1. Call your care team, your response to treatment will improve if you keep your care team informed of your progress. Yellow periods are signs an adjustment may need to be made.     2. Continue your self-care, even if you have to fake it!    3. Talk to someone in your support network    4. Open up your depression survival kit           RED    ZONE Medical Alert - Get Help    What it looks like:     Depression is seriously interfering with your life.     You may experience these or other symptoms: You can t get out of bed most days, can t work or engage in other necessary activities, you have trouble taking care of basic hygiene, or basic responsibilities, thoughts of suicide or death that will not go away, self-injurious behavior.     What you need to do:  1. Call your care team  and request a same-day appointment. If they are not available (weekends or after hours) call your local crisis line, emergency room or 911.      Electronically signed by: Luiza Austin, February 28, 2017    Depression Self Care Plan / Survival Kit    Self-Care for Depression  Here s the deal. Your body and mind are really not as separate as most people think.  What you do and think affects how you feel and how you feel influences what you do and think. This means if you do things that people who feel good do, it will help you feel better.  Sometimes this is all it takes.  There is also a place for medication and therapy depending on how severe your depression is, so be sure to consult with your medical provider and/ or Behavioral Health Consultant if your symptoms are worsening or not improving.     In order to better manage my stress, I will:    Exercise  Get some form of exercise, every day. This will help reduce pain and release endorphins, the  feel good  chemicals in your brain. This is almost as good as taking antidepressants!  This is not the same as joining a gym and then never going! (they count on that by the way ) It can be as simple as just going for a walk or doing some gardening, anything that will get you moving.      Hygiene   Maintain good hygiene (Get out of bed in the morning, Make your bed, Brush your teeth, Take a shower, and Get dressed like you were going to work, even if you are unemployed).  If your clothes don't fit try to get ones that do.    Diet  I will strive to eat foods that are good for me, drink plenty of water, and avoid excessive sugar, caffeine, alcohol, and other mood-altering substances.  Some foods that are helpful in depression are: complex carbohydrates, B vitamins, flaxseed, fish or fish oil, fresh fruits and vegetables.    Psychotherapy  I agree to participate in Individual Therapy (if recommended).    Medication  If prescribed medications, I agree to take them.   Missing doses can result in serious side effects.  I understand that drinking alcohol, or other illicit drug use, may cause potential side effects.  I will not stop my medication abruptly without first discussing it with my provider.    Staying Connected With Others  I will stay in touch with my friends, family members, and my primary care provider/team.    Use your imagination  Be creative.  We all have a creative side; it doesn t matter if it s oil painting, sand castles, or mud pies! This will also kick up the endorphins.    Witness Beauty  (AKA stop and smell the roses) Take a look outside, even in mid-winter. Notice colors, textures. Watch the squirrels and birds.     Service to others  Be of service to others.  There is always someone else in need.  By helping others we can  get out of ourselves  and remember the really important things.  This also provides opportunities for practicing all the other parts of the program.    Humor  Laugh and be silly!  Adjust your TV habits for less news and crime-drama and more comedy.    Control your stress  Try breathing deep, massage therapy, biofeedback, and meditation. Find time to relax each day.     My support system    Clinic Contact:  Phone number:    Contact 1:  Phone number:    Contact 2:  Phone number:    Yarsani/:  Phone number:    Therapist:  Phone number:    Local crisis center:    Phone number:    Other community support:  Phone number:

## 2017-02-28 NOTE — MR AVS SNAPSHOT
After Visit Summary   2/28/2017    Cecil Vasquez    MRN: [id number]           Patient Information     Date Of Birth          1970        Visit Information        Provider Department      2/28/2017 9:20 AM Gonzalo Cole MD [facility]        Today's Diagnoses     Essential hypertension with goal blood pressure less than 140/90    -  1    cad - stent to LAD 2017        Coronary artery disease involving native coronary artery of native heart with unstable angina pectoris (H)        Neuropathy (H)           Follow-ups after your visit        Your next 10 appointments already scheduled     Mar 01, 2017  9:00 AM CST   Evaluation 105 with RUBIN Woodall   [facility] ([facility])    [address] [place] [address] [phone]            Mar 23, 2017 10:40 AM CDT   [facility] with Rose Durham PA-C   [facility]    [address] [facility] [id number] [phone]              Who to contact     If you have questions or need follow up information about today's clinic visit or your schedule please contact [facility] directly at [phone].  Normal or non-critical lab and imaging results will be communicated to you by MyChart, letter or phone within 4 business days after the clinic has received the results. If you do not hear from us within 7 days, please contact the clinic through MyChart or phone. If you have a critical or abnormal lab result, we will notify you by phone as soon as possible.  Submit refill requests through [employer] or call your pharmacy and they will forward the refill request to us. Please allow 3 business days for your refill to be completed.          Additional Information About Your Visit        [employer] Information     [employer] lets you send messages to your doctor, view your test results, renew your prescriptions,  "schedule appointments and more. To sign up, go to [email or web address] . Click on \"Log in\" on the left side of the screen, which will take you to the Welcome page. Then click on \"Sign up Now\" on the right side of the page.     You will be asked to enter the access code listed below, as well as some personal information. Please follow the directions to create your username and password.     Your access code is: [email or web address]  Expires: 2017  8:49 PM     Your access code will  in 90 days. If you need help or a new code, please call your [place] clinic or [phone].        Care EveryWhere ID     This is your Care EveryWhere ID. This could be used by other organizations to access your [place] medical records  [id number]        Your Vitals Were     Pulse Temperature Respirations Pulse Oximetry BMI (Body Mass Index)       87 97.8  F (36.6  C) (Temporal) 20 100% 37.98 kg/m2        Blood Pressure from Last 3 Encounters:   17 150/86   17 123/83   17 (!) 129/91    Weight from Last 3 Encounters:   17 257 lb 3.2 oz (116.7 kg)   17 257 lb 0.9 oz (116.6 kg)   17 260 lb (117.9 kg)              Today, you had the following     No orders found for display         Today's Medication Changes          These changes are accurate as of: 17 11:49 AM.  If you have any questions, ask your nurse or doctor.               Start taking these medicines.        Dose/Directions    HYDROcodone-acetaminophen 7.5-325 MG per tablet   Commonly known as:  NORCO   Used for:  Neuropathy (H)   Started by:  Gonzalo Cole MD        Dose:  1 tablet   Take 1 tablet by mouth every 8 hours as needed for pain 30 day supply   Quantity:  30 tablet   Refills:  0       metoprolol 50 MG 24 hr tablet   Commonly known as:  TOPROL-XL   Used for:  Coronary artery disease involving native coronary artery of native heart with unstable angina pectoris (H)   Started by:  Gonzalo Cole MD        " Dose:  50 mg   Take 1 tablet (50 mg) by mouth daily   Quantity:  30 tablet   Refills:  1         These medicines have changed or have updated prescriptions.        Dose/Directions    * NEURONTIN PO   This may have changed:  Another medication with the same name was changed. Make sure you understand how and when to take each.        Dose:  1200 mg   Take 1,200 mg by mouth At Bedtime   Refills:  0       * gabapentin 600 MG tablet   Commonly known as:  NEURONTIN   This may have changed:    - medication strength  - how much to take  - how to take this  - when to take this   Used for:  Neuropathy (H)   Changed by:  Gonzalo Cole MD        Am & lunchtime.   Quantity:  240 tablet   Refills:  1       * Notice:  This list has 2 medication(s) that are the same as other medications prescribed for you. Read the directions carefully, and ask your doctor or other care provider to review them with you.      Stop taking these medicines if you haven't already. Please contact your care team if you have questions.     metoprolol 25 MG tablet   Commonly known as:  LOPRESSOR   Stopped by:  Gonzalo Cole MD           oxyCODONE 5 MG IR tablet   Commonly known as:  ROXICODONE   Stopped by:  oGnzalo Cole MD                Where to get your medicines      These medications were sent to [address]   [address] [place] [address] [facility] [address]     Phone:  [phone]     aspirin 81 MG EC tablet    gabapentin 600 MG tablet    metoprolol 50 MG 24 hr tablet         Some of these will need a paper prescription and others can be bought over the counter.  Ask your nurse if you have questions.     Bring a paper prescription for each of these medications     HYDROcodone-acetaminophen 7.5-325 MG per tablet    traMADol 50 MG tablet                Primary Care Provider Office Phone # Fax #    Gonzalo Cole [phone]       [place] [address]  [place] [address]        Thank you!     Thank you for choosing [facility]  for your care. Our goal is always to provide you with excellent care. Hearing back from our patients is one way we can continue to improve our services. Please take a few minutes to complete the written survey that you may receive in the mail after your visit with us. Thank you!             Your Updated Medication List - Protect others around you: Learn how to safely use, store and throw away your medicines at www.disposemymeds.org.          This list is accurate as of: 2/28/17 11:49 AM.  Always use your most recent med list.                   Brand Name Dispense Instructions for use    acetaminophen 325 MG tablet    TYLENOL    100 tablet    Take 2 tablets (650 mg) by mouth every 4 hours as needed for mild pain       aspirin 81 MG EC tablet     90 tablet    Take 1 tablet (81 mg) by mouth daily       HYDROcodone-acetaminophen 7.5-325 MG per tablet    NORCO    30 tablet    Take 1 tablet by mouth every 8 hours as needed for pain 30 day supply       ipratropium - albuterol 0.5 mg/2.5 mg/3 mL 0.5-2.5 (3) MG/3ML neb solution    DUONEB    30 vial    Take 1 vial (3 mLs) by nebulization every 4 hours as needed for shortness of breath / dyspnea       lisinopril 10 MG tablet    PRINIVIL/ZESTRIL    30 tablet    Take 1 tablet (10 mg) by mouth daily       melatonin 3 MG tablet     30 tablet    Take 1 tablet (3 mg) by mouth nightly as needed for sleep       metoprolol 50 MG 24 hr tablet    TOPROL-XL    30 tablet    Take 1 tablet (50 mg) by mouth daily       * NEURONTIN PO      Take 1,200 mg by mouth At Bedtime       * gabapentin 600 MG tablet    NEURONTIN    240 tablet    Am & lunchtime.       prasugrel 10 MG Tabs tablet    EFFIENT    36 tablet    Take 6 tabs PO on Thursday, 2/23/2017. Starting Friday, 2/24 take 1 tab PO daily.       rosuvastatin 40 MG tablet    CRESTOR    30 tablet    Take 1 tablet (40 mg) by mouth daily        traMADol 50 MG tablet    ULTRAM    30 tablet    Take 1-2 tablets ( mg) by mouth every 6 hours as needed for moderate pain       VENTOLIN  (90 BASE) MCG/ACT Inhaler   Generic drug:  albuterol     18 g    INHALE 2 PUFFS INTO THE LUNGS EVERY 4 HOURS AS NEEDED FOR SHORTNESS OF BREATH OR DIFFICULTY BREATHING       * Notice:  This list has 2 medication(s) that are the same as other medications prescribed for you. Read the directions carefully, and ask your doctor or other care provider to review them with you.

## 2017-03-01 ENCOUNTER — HOSPITAL ENCOUNTER (OUTPATIENT)
Dept: CARDIAC REHAB | Facility: CLINIC | Age: 47
End: 2017-03-01
Attending: PHYSICIAN ASSISTANT
Payer: COMMERCIAL

## 2017-03-01 VITALS — WEIGHT: 257 LBS | BODY MASS INDEX: 38.06 KG/M2 | HEIGHT: 69 IN

## 2017-03-01 PROCEDURE — 93797 PHYS/QHP OP CAR RHAB WO ECG: CPT | Performed by: REHABILITATION PRACTITIONER

## 2017-03-01 PROCEDURE — 40000116 ZZH STATISTIC OP CR VISIT: Performed by: REHABILITATION PRACTITIONER

## 2017-03-01 PROCEDURE — 40000575 ZZH STATISTIC OP CARDIAC VISIT #2: Performed by: REHABILITATION PRACTITIONER

## 2017-03-01 PROCEDURE — 93798 PHYS/QHP OP CAR RHAB W/ECG: CPT | Performed by: REHABILITATION PRACTITIONER

## 2017-03-01 ASSESSMENT — ASTHMA QUESTIONNAIRES: ACT_TOTALSCORE: 25

## 2017-03-01 ASSESSMENT — 6 MINUTE WALK TEST (6MWT)
MALE CALC: 1874.41
FEMALE CALC: 1613.53
TOTAL DISTANCE WALKED (FT): 1760
GENDER SELECTION: MALE
PREDICTED: 1885.84

## 2017-03-01 NOTE — PROGRESS NOTES
03/01/17 0900   Session   Session Initial Evaluation and Exercise Prescription   Certified through this date 03/30/17   Cardiac Rehab Assessment   Cardiac Rehab Assessment Cecil is a pleasant 46 year old gentleman who had a positive stress test one week ago after experiencing exertional chest discomfort/pressure and had a successfull stent placed to his LAD. Today he is eager to start cardiac rehab and establish positive heart healthy lifestyle changes. He reports no chest discomfort since discharge from the hospital.     The patient's history and clinical status including hemodynamics and ECG were evaluated. He was assessed to be stable and appropriate to begin exercise. His functional capacity and exercise prescription were determined by the completion of the 6 minute walk test. CV response was WNL. No symptoms, complaints or pain were reported. See results below. He was oriented to the program. Risk factor profile was completed. Goals and objectives were discussed. Good prognosis for reaching above goals. Skilled therapy is necessary in order to monitor CV response to exercise, to provide education on risk factors and behavior change counseling needed to achieve patient's goals.  Plan to progress to 40-45 minutes of exercise prior to discharge from cardiac rehab.  Initial THR of up to 85% age predicted max heart rate; Effort rating of 4-7.  Initiate muscle conditioning as appropriate.  Provide risk factor education and behavior change counseling.      General Information   Treatment Diagnosis Stent   Date of Treatment Diagnosis 02/21/17   Significant Past CV History None   Comorbidities None   Lead up symptoms Chest pain/pressure with radiation to arms, teeth; diaphoresis   Hospital Location [facility] Discharge Date 02/22/17   Signs and Symptoms Post Hospital Discharge None   Outpatient Cardiac Rehab Start Date 03/01/17   Primary Physician Dr. Cole   Primary Physician Follow Up Completed  "  Cardiologist Rose Durham PA-C   Cardiologist Follow Up Scheduled   Ejection Fraction 55-60%   Summary of Cath Report   Summary of Cath Report Available   Date Performed 02/21/17   Left Main normal   LAD 99   D1 30% proximal   LCX mild irregularities   OM no significant disease   RCA 10% proximal; mild disease mid; 56% distal   PDA 20%   Living and Work Status    Living Arrangements and Social Status apartment;alone   Support System Live alone;Check-in help as needed   Return to Employment Yes   Occupation [employer]   Preventative Medications   CMS recommended medications Ace inhibitors;Beta Blocker;Lipid Lowering;Antiplatelets   Falls Screen   Have you fallen two or more times in the past year? No   Have you fallen and had an injury in the past year? No   Referral Initiated to Physical Therapy No   Pain   Patient Currently in Pain No   Physical Assessments   Incisions WNL   Edema None   Right Lung Sounds normal   Left Lung Sounds normal   Individualized Treatment Plan   Monitored Sessions Scheduled 24   Monitored Sessions Attended 1   Oxygen   Supplemental Oxygen needed No   Nutrition Management - Weight Management   Assessment Initial Assessment   Age 46   Weight 116.6 kg (257 lb)   Height 1.753 m (5' 9.02\")   BMI (Calculated) 38.01   Goal Weight 86.2 kg (190 lb)   Initial Rate Your Plate Score. Dietary tool to assess eating patterns. Scores range from 24 to 72. The higher the score the healthier the eating pattern. 26   Nutrition Management - Lipids   Lipids Labs Available   Date 02/22/17   Total Cholesterol 257   Triglycerides 2016   HDL 23   Prescribed Lipid Medication Yes   Statin Intensity High Intensity   Nutrition Management - Diabetes   Diabetes No   Nutrition Management Summary   Dietary Recommendations Mediterranean   Stages of Change for Diet Compliance Preparation   Interventions Planned Attend Nutrition Education Class(es)   Interventions In Progress or Completed (has started with dietician)   Patient " Goals Goal #1;Goal #2   Goal #1 Description Patient will attend 2 nutrition education classes to learn more about heart healthy eating   Goal #1 Target Date 04/19/17   Goal #2 Description Patient will create a calorie deficit of 500 kcal/day to achieve weight loss of 1 pound/week   Goal #2 Target Date 03/22/17   Goal #2 Progress Towards Goal 3/1 Patient will increase activity by attending cardiac rehab 3x/week and walking at home as well as start counting calorie intake   Psychosocial Management   Psychosocial Assessment Initial   Is there history of clinical depression or increased risk of depression? History of clinical depression   Current Level of Stress per Patient Report Moderate    Current Coping Skills Uses Stress Management/Relaxation Techniques;Has Positive Support System  (focuses on values)   Initial Patient Health Questionnaire -9 Score (PHQ-9) for depression. 5-9 Minimal symptoms, 10-14 Minor depression, 15-19 Major depression, moderately severe, > 20 Major depression, severe  2   Initial [facility] Survey score.  Quality of Life:   If total score > 25 review individual areas where patient rated a 4 or 5.  Consider patients current medical condition and what role that plays on the score.   Adjust treatment protocol to improve areas of concern.  Consider the following:  PHQ9 score, DASI, and re-assessment within the next 30 days to assist with developing treatments.  12   Interventions Planned Patient denies need for intervention at this time.   Other Core Components - Hypertension   History of or Diagnosis of Hypertension Yes   Currently taking Anti-Hypertensives Yes;Beta blocker;Ace Inhibitor   Other Core Components - Tobacco   History of Tobacco Use Yes   Quit Date or Planned Quit Date 01/01/16   Tobacco Use Status Former (Quit > 6 mo ago)   Tobacco Habit Cigarettes   Tobacco Use per Day (average) 0.5   Years of Tobacco Use 35   Stages of Change Maintenance   Other Core Components Summary    Interventions Planned Instruct patient on the DASH diet;Attend education class on Blood Pressure;List benefits of weight management;Educate on importance of maintaining low sodium diet   Patient Goals Yes   Goal #1 Description Patient will increase knowledge of living a heart healthy lifestyle by attending all 7 of the education classes offered   Goal #1 Target Date 04/19/17   Activity/Exercise History   Activity/Exercise Assessment Initial   Activity/Exercise Status prior to event? Sedentary   Number of Days Currently participating in Moderate Physical Activity? 5   Number of Days Currently performing  Aerobic Exercise (including rehab)? 5   Number of Minutes per Session Currently of Aerobic Exercise (average)? 15   Current Stage of Change (Physical Activity) Preparation   Current Stage of Change (Aerobic Exercise) Preparation   Patient Goals Goal #1   Goal #1 Description Patient will establish a home exercise program   Goal #1 Target Date 03/22/17   Goal #1 Progress Towards Goal 3/1 Patient has joined a local health club but has not started exercising there, is waiting guidance from cardiac rehab staff   Exercise Assessment   6 Minute Walk Predicted - Gender Selection Male   6 Minute Walk Predicted (Male) 1874.41   6 Minute Walk Predicted (Female) 1613.53   Initial 6 Minute Walk Distance (Feet) 1760 ft   Resting HR 79 bpm   Exercise  bpm   Post Exercise HR 86 bpm   Resting /62   Exercise /78   Post Exercise /60   Pre SpO2 96   While Exercising SpO2 97   Effort Rating 7   Current MET Level 3.6   MET Level Goal 7   ECG Rhythm Normal sinus rhythm;Sinus tachycardia   Ectopy None   Current Symptoms Denies symptoms   Limitations/Restrictions None   Exercise Prescription   Mode Treadmill;Nustep;Weights   Duration/Time 15-30 min;30-45 min   Frequency 3 daysweek   THR (85% of age predicted max HR) 146.2   OMNI Effort Rating (0-10 Scale) 4-6/10   Progression Continuous bouts;Total exercise time of  30-45 minutes;Aerobic exercise to OMNI rating of 5-7, and heart rate at or below target;Progress peak intensity by 1/2 MET per week   Recommended Home Exercise   Type of Exercise Walking;Health club   Frequency (days per week) 3   Duration (minutes per session) 15-30 min;30-45 min   Effort Rating Recommended 4-6/10   30 Day Exercise Plan establish home exercise plan   Current Home Exercise   Type of Exercise Walking   Frequency (days per week) 5   Duration (minutes per session) 15   Follow-up/On-going Support   Provider follow-up needed on the following No follow-up needed   Learning Assessment   Learner Patient   Primary Language English   Preferred Learning Style Listening;Reading;Pictures/Video   Barriers to Learning No barriers noted   Patient Education   Education recommended Anatomy and Physiology of the Heart;Blood Pressure;Exercise Principles;Medication Overview;Nutrition;Stress Management     Physician cosignature/electronic signature indicates approval of this ITP document. I have established, reviewed and made necessary   changes to the individualized treatment plan and exercise prescription for this patient.

## 2017-03-06 ENCOUNTER — HOSPITAL ENCOUNTER (OUTPATIENT)
Dept: CARDIAC REHAB | Facility: CLINIC | Age: 47
End: 2017-03-06
Attending: PHYSICIAN ASSISTANT
Payer: COMMERCIAL

## 2017-03-06 PROCEDURE — 40000116 ZZH STATISTIC OP CR VISIT: Performed by: REHABILITATION PRACTITIONER

## 2017-03-06 PROCEDURE — 93798 PHYS/QHP OP CAR RHAB W/ECG: CPT | Performed by: REHABILITATION PRACTITIONER

## 2017-03-17 DIAGNOSIS — I10 ESSENTIAL HYPERTENSION WITH GOAL BLOOD PRESSURE LESS THAN 140/90: ICD-10-CM

## 2017-03-17 DIAGNOSIS — I25.110 CORONARY ARTERY DISEASE INVOLVING NATIVE CORONARY ARTERY OF NATIVE HEART WITH UNSTABLE ANGINA PECTORIS (H): ICD-10-CM

## 2017-03-17 DIAGNOSIS — G62.9 NEUROPATHY: ICD-10-CM

## 2017-03-17 DIAGNOSIS — I24.9 ACS (ACUTE CORONARY SYNDROME) (H): ICD-10-CM

## 2017-03-17 RX ORDER — TRAMADOL HYDROCHLORIDE 50 MG/1
50-100 TABLET ORAL EVERY 6 HOURS PRN
Qty: 30 TABLET | Refills: 0 | Status: SHIPPED | OUTPATIENT
Start: 2017-03-17 | End: 2017-03-23

## 2017-03-17 RX ORDER — LISINOPRIL 10 MG/1
10 TABLET ORAL DAILY
Qty: 90 TABLET | Refills: 0 | Status: SHIPPED | OUTPATIENT
Start: 2017-03-17 | End: 2017-03-23

## 2017-03-17 RX ORDER — ROSUVASTATIN CALCIUM 40 MG/1
40 TABLET, COATED ORAL DAILY
Qty: 90 TABLET | Refills: 0 | Status: SHIPPED | OUTPATIENT
Start: 2017-03-17 | End: 2017-03-23

## 2017-03-17 RX ORDER — PRASUGREL 10 MG/1
10 TABLET, FILM COATED ORAL DAILY
Qty: 90 TABLET | Refills: 0 | Status: SHIPPED | OUTPATIENT
Start: 2017-03-17 | End: 2017-03-23

## 2017-03-17 NOTE — TELEPHONE ENCOUNTER
Lisinopril      Last Written Prescription Date: 02/22/2017  Last Fill Quantity: 30, # refills: 0      Crestor       Last Written Prescription Date: 02/22/2017  Last Fill Quantity: 30, # refills: 0      Effient      Last Written Prescription Date: 02/22/2017  Last Fill Quantity: 36,  # refills: 0     Tramadol      Last Written Prescription Date: 02/28/2017  Last Fill Quantity: 30,  # refills: 0   Last Office Visit with [facility] or [facility] prescribing provider: 02/28/2017                                         Next 5 appointments (look out 90 days)     Mar 23, 2017 10:40 AM CDT   Return Visit with Rose Durham PA-C   [facility] ([facility])    [address] [place] [address]   [phone]            Mar 23, 2017 12:20 PM CDT   Office Visit with Gonzalo Cole MD   [facility] ([facility])    [address] [place] [address]   [phone]                                Cholesterol   Date Value Ref Range Status   02/22/2017 257 (H) <200 mg/dL Final     Comment:     Desirable:       <200 mg/dl     HDL Cholesterol   Date Value Ref Range Status   02/22/2017 23 (L) >39 mg/dL Final     LDL Cholesterol Calculated   Date Value Ref Range Status   02/22/2017  <100 mg/dL Final    Cannot estimate LDL when triglyceride exceeds 400 mg/dL     LDL Cholesterol Direct   Date Value Ref Range Status   09/08/2011 140 (H) 0 - 129 mg/dL Final     Comment:     Optimal:         <100 mg/dL   Near Optimal:     100-129 mg/dL   Borderline High:  130-159 mg/dL   High:             160-189 mg/dL   Very high:  greater than or equal to 190 mg/dL   Cannot estimate LDL when triglyceride exceeds 400 mg/dL     Triglycerides   Date Value Ref Range Status   02/22/2017 2016 (H) <150 mg/dL Final     Comment:     Borderline high:  150-199 mg/dl   High:             200-499 mg/dl   Very high:       >499 mg/dl       Cholesterol/HDL Ratio   Date Value Ref  Range Status   01/13/2015 6.5 (H) 0.0 - 5.0 Final     ALT   Date Value Ref Range Status   02/13/2017 33 0 - 70 U/L Final            BP Readings from Last 3 Encounters:   02/28/17 150/86   02/22/17 123/83   02/21/17 (!) 129/91     Thanks  Cate Saavedra [facility]   [phone]

## 2017-03-23 ENCOUNTER — OFFICE VISIT (OUTPATIENT)
Dept: CARDIOLOGY | Facility: CLINIC | Age: 47
End: 2017-03-23
Payer: COMMERCIAL

## 2017-03-23 ENCOUNTER — OFFICE VISIT (OUTPATIENT)
Dept: FAMILY MEDICINE | Facility: CLINIC | Age: 47
End: 2017-03-23
Payer: COMMERCIAL

## 2017-03-23 VITALS
WEIGHT: 249.1 LBS | DIASTOLIC BLOOD PRESSURE: 82 MMHG | OXYGEN SATURATION: 98 % | HEART RATE: 88 BPM | SYSTOLIC BLOOD PRESSURE: 118 MMHG | HEIGHT: 69 IN | BODY MASS INDEX: 36.89 KG/M2

## 2017-03-23 VITALS
OXYGEN SATURATION: 98 % | DIASTOLIC BLOOD PRESSURE: 76 MMHG | TEMPERATURE: 97.5 F | WEIGHT: 247.9 LBS | RESPIRATION RATE: 20 BRPM | SYSTOLIC BLOOD PRESSURE: 122 MMHG | BODY MASS INDEX: 36.61 KG/M2 | HEART RATE: 89 BPM

## 2017-03-23 DIAGNOSIS — G62.9 NEUROPATHY: ICD-10-CM

## 2017-03-23 DIAGNOSIS — G89.4 CHRONIC PAIN SYNDROME: Primary | ICD-10-CM

## 2017-03-23 DIAGNOSIS — E78.5 DYSLIPIDEMIA, GOAL LDL BELOW 70: Primary | ICD-10-CM

## 2017-03-23 DIAGNOSIS — I24.9 ACS (ACUTE CORONARY SYNDROME) (H): ICD-10-CM

## 2017-03-23 DIAGNOSIS — I25.110 CORONARY ARTERY DISEASE INVOLVING NATIVE CORONARY ARTERY OF NATIVE HEART WITH UNSTABLE ANGINA PECTORIS (H): ICD-10-CM

## 2017-03-23 DIAGNOSIS — I10 ESSENTIAL HYPERTENSION WITH GOAL BLOOD PRESSURE LESS THAN 140/90: ICD-10-CM

## 2017-03-23 PROCEDURE — 80307 DRUG TEST PRSMV CHEM ANLYZR: CPT | Mod: 90 | Performed by: FAMILY MEDICINE

## 2017-03-23 PROCEDURE — 99214 OFFICE O/P EST MOD 30 MIN: CPT | Performed by: PHYSICIAN ASSISTANT

## 2017-03-23 PROCEDURE — 99214 OFFICE O/P EST MOD 30 MIN: CPT | Performed by: FAMILY MEDICINE

## 2017-03-23 PROCEDURE — 99000 SPECIMEN HANDLING OFFICE-LAB: CPT | Performed by: FAMILY MEDICINE

## 2017-03-23 RX ORDER — PRASUGREL 10 MG/1
10 TABLET, FILM COATED ORAL DAILY
Qty: 90 TABLET | Refills: 3 | Status: ON HOLD | OUTPATIENT
Start: 2017-03-23 | End: 2019-10-16

## 2017-03-23 RX ORDER — TRAMADOL HYDROCHLORIDE 50 MG/1
50-100 TABLET ORAL EVERY 6 HOURS PRN
Qty: 120 TABLET | Refills: 0 | Status: SHIPPED | OUTPATIENT
Start: 2017-03-23 | End: 2017-04-20

## 2017-03-23 RX ORDER — GABAPENTIN 300 MG/1
CAPSULE ORAL
Qty: 30 CAPSULE | Refills: 0 | Status: SHIPPED | OUTPATIENT
Start: 2017-03-23 | End: 2017-04-20

## 2017-03-23 RX ORDER — NITROGLYCERIN 0.4 MG/1
TABLET SUBLINGUAL
Qty: 25 TABLET | Refills: 3 | Status: SHIPPED | OUTPATIENT
Start: 2017-03-23 | End: 2017-06-13

## 2017-03-23 RX ORDER — NITROGLYCERIN 0.4 MG/1
TABLET SUBLINGUAL
Qty: 25 TABLET | Refills: 3 | Status: SHIPPED | OUTPATIENT
Start: 2017-03-23 | End: 2017-03-23

## 2017-03-23 RX ORDER — ROSUVASTATIN CALCIUM 40 MG/1
40 TABLET, COATED ORAL DAILY
Qty: 90 TABLET | Refills: 3 | Status: SHIPPED | OUTPATIENT
Start: 2017-03-23 | End: 2017-08-01

## 2017-03-23 RX ORDER — OXYCODONE HYDROCHLORIDE 5 MG/1
5 TABLET ORAL EVERY 4 HOURS PRN
Qty: 60 TABLET | Refills: 0 | Status: ON HOLD | OUTPATIENT
Start: 2017-03-23 | End: 2017-04-12

## 2017-03-23 RX ORDER — LISINOPRIL 10 MG/1
10 TABLET ORAL DAILY
Qty: 90 TABLET | Refills: 3 | Status: SHIPPED | OUTPATIENT
Start: 2017-03-23 | End: 2017-10-06

## 2017-03-23 RX ORDER — METOPROLOL SUCCINATE 50 MG/1
50 TABLET, EXTENDED RELEASE ORAL DAILY
Qty: 90 TABLET | Refills: 3 | Status: SHIPPED | OUTPATIENT
Start: 2017-03-23 | End: 2017-07-20

## 2017-03-23 ASSESSMENT — PAIN SCALES - GENERAL: PAINLEVEL: MILD PAIN (2)

## 2017-03-23 NOTE — PROGRESS NOTES
SUBJECTIVE:                                                    Cecil Vasquez is a 46 year old male who presents to clinic today for the following health issues:      Hypertension Follow-up      Outpatient blood pressures are being checked at home.  Results are 118's/80's.    Low Salt Diet: no added salt       Chronic Pain Follow-Up       Type / Location of Pain: head  Analgesia/pain control:       Recent changes:  same      Overall control: Inadequate pain control  Activity level/function:      Daily activities:  Able to do light housework, cooking, Able to do moderate activities and Able to do all daily activities    Work:  Pain does not limit any  work  Adverse effects:  No  Adherance    Taking medication as directed?  Yes    Participating in other treatments: no   Risk Factors:    Sleep:  Good    Mood/anxiety:  controlled    Recent family or social stressors:  none noted    Other aggravating factors: none  PHQ-9 SCORE 6/7/2016 11/17/2016 12/16/2016   Total Score - - -   Total Score 0 1 5     LAUREN-7 SCORE 2/9/2015 3/24/2015 12/16/2016   Total Score 0 2 -   Total Score - - 0     Encounter-Level CSA:     There are no encounter-level csa.             Amount of exercise or physical activity: 2-3 days/week for an average of 15-30 minutes    Problems taking medications regularly: No    Medication side effects: none    Diet: regular (no restrictions)      ROS:  Constitutional, HEENT, cardiovascular, pulmonary, gi and gu systems are negative, except as otherwise noted.    OBJECTIVE:                                                    /76 (BP Location: Right arm, Patient Position: Chair, Cuff Size: Adult Regular)  Pulse 89  Temp 97.5  F (36.4  C) (Temporal)  Resp 20  Wt 247 lb 14.4 oz (112.4 kg)  SpO2 98%  BMI 36.61 kg/m2  Body mass index is 36.61 kg/(m^2).  GENERAL: healthy, alert and no distress  NECK: no adenopathy, no asymmetry, masses, or scars and thyroid normal to palpation  RESP: lungs clear to  auscultation - no rales, rhonchi or wheezes  CV: regular rate and rhythm, normal S1 S2, no S3 or S4, no murmur, click or rub, no peripheral edema and peripheral pulses strong  ABDOMEN: soft, nontender, no hepatosplenomegaly, no masses and bowel sounds normal  MS: no gross musculoskeletal defects noted, no edema    Diagnostic Test Results:  none      ASSESSMENT/PLAN:                                                              ICD-10-CM    1. Chronic pain syndrome G89.4 Pain Drug Screen, urine with reported meds   2. Coronary artery disease involving native coronary artery of native heart with unstable angina pectoris (H) I25.110 nitroglycerin (NITROSTAT) 0.4 MG sublingual tablet    s/p PHYLLIS to LAD 2/21/2017   3. Neuropathy (H) G62.9 traMADol (ULTRAM) 50 MG tablet     oxyCODONE (ROXICODONE) 5 MG IR tablet     gabapentin (NEURONTIN) 300 MG capsule       Chronic pain uncontrolled. We'll increase his gabapentin to 900 mg in the a.m., 600 in the afternoon, 1200 at bedtime. He was given a prescription for 300 mg tablets to continue increasing by 300 mg until near 3600 mg daily. In the meantime, may continue to use 2-3 tablets of oxycodone as needed daily, and tramadol 3-4 times daily as well. Tramadol as used during work hours, oxycodone at night for breakthrough pain. See him back in 1-2 months.    Gonzalo Cole MD  [facility]

## 2017-03-23 NOTE — MR AVS SNAPSHOT
After Visit Summary   3/23/2017    Cecil Vasquez    MRN: [id number]           Patient Information     Date Of Birth          1970        Visit Information        Provider Department      3/23/2017 10:40 AM Marva, Rose Swan PA-C [facility]        Today's Diagnoses     Dyslipidemia, goal LDL below 70    -  1    Essential hypertension with goal blood pressure less than 140/90        ACS (acute coronary syndrome) (H)        cad - stent to LAD 2017        Coronary artery disease involving native coronary artery of native heart with unstable angina pectoris (H)          Care Instructions    Thanks for coming into [facility] today.    We discussed: congratulations on all your healthy changes.      Medication changes:  Continue current medications.      We'll do fasting labs in 1 month to check your cholesterol.      Follow up: with me in about 3 months, please call sooner with concerns.        Please call the clinic at  [phone] with any questions or concerns and my our nurses will be happy to help.    Please call [phone] for scheduling.      Reminder: Please bring in all current medications, over the counter supplements and vitamin bottles to your next appointment.            Follow-ups after your visit        Additional Services     Follow-Up with Cardiac Advanced Practice Provider                 Your next 10 appointments already scheduled     Mar 23, 2017 12:20 PM CDT   Office Visit with Gonzalo Cole MD   [facility] ([facility])    [address] [place] [id number] [phone]           Bring a current list of meds and any records pertaining to this visit.  For Physicals, please bring immunization records and any forms needing to be filled out.  Please arrive 10 minutes early to complete paperwork.            Mar 24, 2017  8:00 AM CDT   Treatment 60 with Skyla Cheek     [facility] ([facility])    [address] [place] [address]   [phone]            Mar 27, 2017  8:00 AM CDT   Treatment 60 with GURPREET Kearns   [facility] ([facility])    [address] [place] [address]   [phone]            Mar 29, 2017  8:00 AM CDT   Treatment 60 with GURPREET Kearns   [facility] ([facility])    [address] [place] [address]   [phone]            Mar 31, 2017  8:00 AM CDT   Treatment 60 with RUBIN Woodall   [facility] ([facility])    [address] [place] [address]   [phone]              Future tests that were ordered for you today     Open Future Orders        Priority Expected Expires Ordered    Basic metabolic panel Routine 4/22/2017 3/23/2018 3/23/2017    Follow-Up with Cardiac Advanced Practice Provider Routine 6/21/2017 3/23/2018 3/23/2017    Basic metabolic panel Routine 6/21/2017 3/23/2018 3/23/2017    Lipid Profile Routine 6/21/2017 3/23/2018 3/23/2017    ALT Routine 6/21/2017 3/23/2018 3/23/2017            Who to contact     If you have questions or need follow up information about today's clinic visit or your schedule please contact [facility] directly at [phone].  Normal or non-critical lab and imaging results will be communicated to you by MyChart, letter or phone within 4 business days after the clinic has received the results. If you do not hear from us within 7 days, please contact the clinic through MyChart or phone. If you have a critical or abnormal lab result, we will notify you by phone as soon as possible.  Submit refill requests through [employer] or call your pharmacy and they will forward the refill request to us. Please allow 3 business days for your refill to be completed.          Additional Information About Your Visit       "  MyChart Information     SleepOut lets you send messages to your doctor, view your test results, renew your prescriptions, schedule appointments and more. To sign up, go to [email or web address] . Click on \"Log in\" on the left side of the screen, which will take you to the Welcome page. Then click on \"Sign up Now\" on the right side of the page.     You will be asked to enter the access code listed below, as well as some personal information. Please follow the directions to create your username and password.     Your access code is: [email or web address]  Expires: 2017  9:49 PM     Your access code will  in 90 days. If you need help or a new code, please call your [facility] clinic or [phone].        Care EveryWhere ID     This is your Care EveryWhere ID. This could be used by other organizations to access your [facility] medical records  [id number]        Your Vitals Were     Pulse Height Pulse Oximetry BMI (Body Mass Index)          88 1.753 m (5' 9\") 98% 36.79 kg/m2         Blood Pressure from Last 3 Encounters:   17 118/82   17 150/86   17 123/83    Weight from Last 3 Encounters:   17 113 kg (249 lb 1.6 oz)   17 116.6 kg (257 lb)   17 116.7 kg (257 lb 3.2 oz)              We Performed the Following     Lipid panel reflex to direct LDL          Today's Medication Changes          These changes are accurate as of: 3/23/17 11:21 AM.  If you have any questions, ask your nurse or doctor.               Stop taking these medicines if you haven't already. Please contact your care team if you have questions.     acetaminophen 325 MG tablet   Commonly known as:  TYLENOL   Stopped by:  Rose Durham PA-C                Where to get your medicines      These medications were sent to [facility] [place]  [place], [place] - [address]  [address] [place] [address] [address]     Phone:  [phone]     lisinopril 10 MG tablet    metoprolol 50 MG 24 hr tablet    " prasugrel 10 MG Tabs tablet    rosuvastatin 40 MG tablet                Primary Care Provider Office Phone # Fax #    Gonzalo Kwame Cole [phone]       [address] [place] [address]        Thank you!     Thank you for choosing [facility]  for your care. Our goal is always to provide you with excellent care. Hearing back from our patients is one way we can continue to improve our services. Please take a few minutes to complete the written survey that you may receive in the mail after your visit with us. Thank you!             Your Updated Medication List - Protect others around you: Learn how to safely use, store and throw away your medicines at www.disposemymeds.org.          This list is accurate as of: 3/23/17 11:21 AM.  Always use your most recent med list.                   Brand Name Dispense Instructions for use    aspirin 81 MG EC tablet     90 tablet    Take 1 tablet (81 mg) by mouth daily       HYDROcodone-acetaminophen 7.5-325 MG per tablet    NORCO    30 tablet    Take 1 tablet by mouth every 8 hours as needed for pain 30 day supply       ipratropium - albuterol 0.5 mg/2.5 mg/3 mL 0.5-2.5 (3) MG/3ML neb solution    DUONEB    30 vial    Take 1 vial (3 mLs) by nebulization every 4 hours as needed for shortness of breath / dyspnea       lisinopril 10 MG tablet    PRINIVIL/ZESTRIL    90 tablet    Take 1 tablet (10 mg) by mouth daily       melatonin 3 MG tablet     30 tablet    Take 1 tablet (3 mg) by mouth nightly as needed for sleep       metoprolol 50 MG 24 hr tablet    TOPROL-XL    90 tablet    Take 1 tablet (50 mg) by mouth daily       * NEURONTIN PO      Take 1,200 mg by mouth At Bedtime       * gabapentin 600 MG tablet    NEURONTIN    240 tablet    Am & lunchtime.       prasugrel 10 MG Tabs tablet    EFFIENT    90 tablet    Take 1 tablet (10 mg) by mouth daily       rosuvastatin 40 MG tablet    CRESTOR    90 tablet    Take 1 tablet  (40 mg) by mouth daily       traMADol 50 MG tablet    ULTRAM    30 tablet    Take 1-2 tablets ( mg) by mouth every 6 hours as needed for moderate pain       VENTOLIN  (90 BASE) MCG/ACT Inhaler   Generic drug:  albuterol     18 g    INHALE 2 PUFFS INTO THE LUNGS EVERY 4 HOURS AS NEEDED FOR SHORTNESS OF BREATH OR DIFFICULTY BREATHING       * Notice:  This list has 2 medication(s) that are the same as other medications prescribed for you. Read the directions carefully, and ask your doctor or other care provider to review them with you.

## 2017-03-23 NOTE — PROGRESS NOTES
[id number]  HPI and Plan:   See dictation    Orders this Visit:  Orders Placed This Encounter   Procedures     Lipid panel reflex to direct LDL     Basic metabolic panel     Basic metabolic panel     Lipid Profile     ALT     ALT     Lipid Profile     Follow-Up with Cardiac Advanced Practice Provider     Orders Placed This Encounter   Medications     lisinopril (PRINIVIL/ZESTRIL) 10 MG tablet     Sig: Take 1 tablet (10 mg) by mouth daily     Dispense:  90 tablet     Refill:  3     metoprolol (TOPROL-XL) 50 MG 24 hr tablet     Sig: Take 1 tablet (50 mg) by mouth daily     Dispense:  90 tablet     Refill:  3     prasugrel (EFFIENT) 10 MG TABS tablet     Sig: Take 1 tablet (10 mg) by mouth daily     Dispense:  90 tablet     Refill:  3     rosuvastatin (CRESTOR) 40 MG tablet     Sig: Take 1 tablet (40 mg) by mouth daily     Dispense:  90 tablet     Refill:  3     nitroglycerin (NITROSTAT) 0.4 MG sublingual tablet     Sig: For chest pain place 1 tablet under the tongue every 5 minutes for 3 doses. If symptoms persist 5 minutes after 1st dose call 911.     Dispense:  25 tablet     Refill:  3     Medications Discontinued During This Encounter   Medication Reason     acetaminophen (TYLENOL) 325 MG tablet      lisinopril (PRINIVIL/ZESTRIL) 10 MG tablet Reorder     metoprolol (TOPROL-XL) 50 MG 24 hr tablet Reorder     prasugrel (EFFIENT) 10 MG TABS tablet Reorder     rosuvastatin (CRESTOR) 40 MG tablet Reorder         Encounter Diagnoses   Name Primary?     Dyslipidemia, goal LDL below 70 Yes     Essential hypertension with goal blood pressure less than 140/90      ACS (acute coronary syndrome) (H)      cad - stent to LAD 2017      Coronary artery disease involving native coronary artery of native heart with unstable angina pectoris (H)        CURRENT MEDICATIONS:  Current Outpatient Prescriptions   Medication Sig Dispense Refill     lisinopril (PRINIVIL/ZESTRIL) 10 MG tablet Take 1 tablet (10 mg) by mouth daily 90 tablet 3      metoprolol (TOPROL-XL) 50 MG 24 hr tablet Take 1 tablet (50 mg) by mouth daily 90 tablet 3     prasugrel (EFFIENT) 10 MG TABS tablet Take 1 tablet (10 mg) by mouth daily 90 tablet 3     rosuvastatin (CRESTOR) 40 MG tablet Take 1 tablet (40 mg) by mouth daily 90 tablet 3     nitroglycerin (NITROSTAT) 0.4 MG sublingual tablet For chest pain place 1 tablet under the tongue every 5 minutes for 3 doses. If symptoms persist 5 minutes after 1st dose call 911. 25 tablet 3     traMADol (ULTRAM) 50 MG tablet Take 1-2 tablets ( mg) by mouth every 6 hours as needed for moderate pain 30 tablet 0     aspirin 81 MG EC tablet Take 1 tablet (81 mg) by mouth daily 90 tablet 3     gabapentin (NEURONTIN) 600 MG tablet Am & lunchtime. 240 tablet 1     HYDROcodone-acetaminophen (NORCO) 7.5-325 MG per tablet Take 1 tablet by mouth every 8 hours as needed for pain 30 day supply 30 tablet 0     VENTOLIN  (90 BASE) MCG/ACT Inhaler INHALE 2 PUFFS INTO THE LUNGS EVERY 4 HOURS AS NEEDED FOR SHORTNESS OF BREATH OR DIFFICULTY BREATHING 18 g 0     Gabapentin (NEURONTIN PO) Take 1,200 mg by mouth At Bedtime       ipratropium - albuterol 0.5 mg/2.5 mg/3 mL (DUONEB) 0.5-2.5 (3) MG/3ML neb solution Take 1 vial (3 mLs) by nebulization every 4 hours as needed for shortness of breath / dyspnea 30 vial 0     melatonin 3 MG tablet Take 1 tablet (3 mg) by mouth nightly as needed for sleep 30 tablet 1     [DISCONTINUED] rosuvastatin (CRESTOR) 40 MG tablet Take 1 tablet (40 mg) by mouth daily 90 tablet 0     [DISCONTINUED] prasugrel (EFFIENT) 10 MG TABS tablet Take 1 tablet (10 mg) by mouth daily 90 tablet 0     [DISCONTINUED] lisinopril (PRINIVIL/ZESTRIL) 10 MG tablet Take 1 tablet (10 mg) by mouth daily 90 tablet 0     [DISCONTINUED] metoprolol (TOPROL-XL) 50 MG 24 hr tablet Take 1 tablet (50 mg) by mouth daily 30 tablet 1       ALLERGIES     Allergies   Allergen Reactions     No Known Drug Allergies        PAST MEDICAL HISTORY:  Past Medical  History:   Diagnosis Date     ADHD (attention deficit hyperactivity disorder)      Bipolar 1 disorder (H)      Depression      Gastro-oesophageal reflux disease      Hyperlipidaemia      Hypertension      Impaired fasting glucose      Nephrolithiasis      Obesity      Reactive airway disease     uses albuterol inhaler when has cold     Renal calcification      Rosacea      Tobacco abuse      Trigeminal neuralgia      Uncomplicated asthma        PAST SURGICAL HISTORY:  Past Surgical History:   Procedure Laterality Date     COMBINED CYSTOSCOPY, INSERT CATHETER URETER Left 3/25/2016    Procedure: COMBINED CYSTOSCOPY, INSERT CATHETER URETER;  Surgeon: Jorden Villafana MD;  Location:  OR     COMBINED CYSTOSCOPY, RETROGRADES, URETEROSCOPY, LASER HOLMIUM LITHOTRIPSY URETER(S), INSERT STENT Left 4/13/2016    Procedure: COMBINED CYSTOSCOPY, RETROGRADES, URETEROSCOPY, LASER HOLMIUM LITHOTRIPSY URETER(S), INSERT STENT;  Surgeon: Jorden Villafana MD;  Location:  OR     EXTRACORPOREAL SHOCK WAVE LITHOTRIPSY (ESWL) Left 3/25/2016    Procedure: EXTRACORPOREAL SHOCK WAVE LITHOTRIPSY (ESWL);  Surgeon: Jorden Villafana MD;  Location:  OR     EXTRACORPOREAL SHOCK WAVE LITHOTRIPSY, CYSTOSCOPY, INSERT STENT URETER(S), COMBINED Left 3/25/2016    Procedure: COMBINED EXTRACORPOREAL SHOCK WAVE LITHOTRIPSY, CYSTOSCOPY, INSERT STENT URETER(S);  Surgeon: Jorden Villafana MD;  Location:  OR     GENITOURINARY SURGERY  3 25 2016     Lithotripsy and stent placement     TONSILLECTOMY         FAMILY HISTORY:  Family History   Problem Relation Age of Onset     Psychotic Disorder Mother      DIABETES Sister      Psychotic Disorder Sister        SOCIAL HISTORY:  Social History     Social History     Marital status:      Spouse name: N/A     Number of children: N/A     Years of education: N/A     Social History Main Topics     Smoking status: Former Smoker     Packs/day: 0.50     Types: Cigarettes     Smokeless  "tobacco: Never Used     Alcohol use No     Drug use: No     Sexual activity: Not Currently     Other Topics Concern      Service No     Blood Transfusions No     Caffeine Concern No     Occupational Exposure No     Hobby Hazards No     Sleep Concern Yes     Stress Concern No     Weight Concern Yes     Special Diet No     low sodium     Back Care No     Exercise No     Seat Belt Yes     Self-Exams Yes     Social History Narrative        3 children    Working at [employer]       Review of Systems:  Skin:  Negative     Eyes:  Positive for    ENT:  Negative    Respiratory:  Negative    Cardiovascular:  Negative for;palpitations;chest pain;edema;lightheadedness;dizziness fatigue;Positive for  Gastroenterology: Negative    Genitourinary:  Negative    Musculoskeletal:  Positive for foot pain  Neurologic:  Negative    Psychiatric:  Positive for sleep disturbances  Heme/Lymph/Imm:  Negative    Endocrine:  Negative      Physical Exam:  Vitals: /82 (BP Location: Right arm, Patient Position: Fowlers, Cuff Size: Adult Large)  Pulse 88  Ht 1.753 m (5' 9\")  Wt 113 kg (249 lb 1.6 oz)  SpO2 98%  BMI 36.79 kg/m2   Please refer to dictation for physical exam    Recent Lab Results:  LIPID RESULTS:  Lab Results   Component Value Date    CHOL 257 (H) 02/22/2017    HDL 23 (L) 02/22/2017    LDL  02/22/2017     Cannot estimate LDL when triglyceride exceeds 400 mg/dL    TRIG 2016 (H) 02/22/2017    CHOLHDLRATIO 6.5 (H) 01/13/2015       LIVER ENZYME RESULTS:  Lab Results   Component Value Date    AST 20 02/13/2017    ALT 33 02/13/2017       CBC RESULTS:  Lab Results   Component Value Date    WBC 10.5 02/22/2017    RBC 4.69 02/22/2017    HGB 14.3 02/22/2017    HCT 41.0 02/22/2017    MCV 87 02/22/2017    MCH 30.5 02/22/2017    MCHC 34.9 02/22/2017    RDW 13.5 02/22/2017     02/22/2017       BMP RESULTS:  Lab Results   Component Value Date     02/22/2017    POTASSIUM 4.5 02/22/2017    CHLORIDE 102 " 02/22/2017    CO2 25 02/22/2017    ANIONGAP 9 02/22/2017     (H) 02/22/2017    BUN 23 02/22/2017    CR 0.96 02/22/2017    GFRESTIMATED 84 02/22/2017    GFRESTBLACK >90   GFR Calc   02/22/2017    LÁZARO 8.2 (L) 02/22/2017        A1C RESULTS:  Lab Results   Component Value Date    A1C 6.1 (H) 12/18/2015       INR RESULTS:  No results found for: INR        CC  No referring provider defined for this encounter.

## 2017-03-23 NOTE — LETTER
3/23/2017      RE: Cecil Vasquez  [address]  [facility] [address]       Dear Colleague,    Thank you for the opportunity to participate in the care of your patient, Cecil Vasquez, at the [facility]. Please see a copy of my visit note below.    [id number]  HPI and Plan:   See dictation    Orders this Visit:  Orders Placed This Encounter   Procedures     Lipid panel reflex to direct LDL     Basic metabolic panel     Basic metabolic panel     Lipid Profile     ALT     ALT     Lipid Profile     Follow-Up with Cardiac Advanced Practice Provider     Orders Placed This Encounter   Medications     lisinopril (PRINIVIL/ZESTRIL) 10 MG tablet     Sig: Take 1 tablet (10 mg) by mouth daily     Dispense:  90 tablet     Refill:  3     metoprolol (TOPROL-XL) 50 MG 24 hr tablet     Sig: Take 1 tablet (50 mg) by mouth daily     Dispense:  90 tablet     Refill:  3     prasugrel (EFFIENT) 10 MG TABS tablet     Sig: Take 1 tablet (10 mg) by mouth daily     Dispense:  90 tablet     Refill:  3     rosuvastatin (CRESTOR) 40 MG tablet     Sig: Take 1 tablet (40 mg) by mouth daily     Dispense:  90 tablet     Refill:  3     nitroglycerin (NITROSTAT) 0.4 MG sublingual tablet     Sig: For chest pain place 1 tablet under the tongue every 5 minutes for 3 doses. If symptoms persist 5 minutes after 1st dose call 911.     Dispense:  25 tablet     Refill:  3     Medications Discontinued During This Encounter   Medication Reason     acetaminophen (TYLENOL) 325 MG tablet      lisinopril (PRINIVIL/ZESTRIL) 10 MG tablet Reorder     metoprolol (TOPROL-XL) 50 MG 24 hr tablet Reorder     prasugrel (EFFIENT) 10 MG TABS tablet Reorder     rosuvastatin (CRESTOR) 40 MG tablet Reorder         Encounter Diagnoses   Name Primary?     Dyslipidemia, goal LDL below 70 Yes     Essential hypertension with goal blood pressure less than 140/90      ACS (acute coronary syndrome) (H)      cad  - stent to LAD 2017      Coronary artery disease involving native coronary artery of native heart with unstable angina pectoris (H)        CURRENT MEDICATIONS:  Current Outpatient Prescriptions   Medication Sig Dispense Refill     lisinopril (PRINIVIL/ZESTRIL) 10 MG tablet Take 1 tablet (10 mg) by mouth daily 90 tablet 3     metoprolol (TOPROL-XL) 50 MG 24 hr tablet Take 1 tablet (50 mg) by mouth daily 90 tablet 3     prasugrel (EFFIENT) 10 MG TABS tablet Take 1 tablet (10 mg) by mouth daily 90 tablet 3     rosuvastatin (CRESTOR) 40 MG tablet Take 1 tablet (40 mg) by mouth daily 90 tablet 3     nitroglycerin (NITROSTAT) 0.4 MG sublingual tablet For chest pain place 1 tablet under the tongue every 5 minutes for 3 doses. If symptoms persist 5 minutes after 1st dose call 911. 25 tablet 3     traMADol (ULTRAM) 50 MG tablet Take 1-2 tablets ( mg) by mouth every 6 hours as needed for moderate pain 30 tablet 0     aspirin 81 MG EC tablet Take 1 tablet (81 mg) by mouth daily 90 tablet 3     gabapentin (NEURONTIN) 600 MG tablet Am & lunchtime. 240 tablet 1     HYDROcodone-acetaminophen (NORCO) 7.5-325 MG per tablet Take 1 tablet by mouth every 8 hours as needed for pain 30 day supply 30 tablet 0     VENTOLIN  (90 BASE) MCG/ACT Inhaler INHALE 2 PUFFS INTO THE LUNGS EVERY 4 HOURS AS NEEDED FOR SHORTNESS OF BREATH OR DIFFICULTY BREATHING 18 g 0     Gabapentin (NEURONTIN PO) Take 1,200 mg by mouth At Bedtime       ipratropium - albuterol 0.5 mg/2.5 mg/3 mL (DUONEB) 0.5-2.5 (3) MG/3ML neb solution Take 1 vial (3 mLs) by nebulization every 4 hours as needed for shortness of breath / dyspnea 30 vial 0     melatonin 3 MG tablet Take 1 tablet (3 mg) by mouth nightly as needed for sleep 30 tablet 1     [DISCONTINUED] rosuvastatin (CRESTOR) 40 MG tablet Take 1 tablet (40 mg) by mouth daily 90 tablet 0     [DISCONTINUED] prasugrel (EFFIENT) 10 MG TABS tablet Take 1 tablet (10 mg) by mouth daily 90 tablet 0      [DISCONTINUED] lisinopril (PRINIVIL/ZESTRIL) 10 MG tablet Take 1 tablet (10 mg) by mouth daily 90 tablet 0     [DISCONTINUED] metoprolol (TOPROL-XL) 50 MG 24 hr tablet Take 1 tablet (50 mg) by mouth daily 30 tablet 1       ALLERGIES     Allergies   Allergen Reactions     No Known Drug Allergies        PAST MEDICAL HISTORY:  Past Medical History:   Diagnosis Date     ADHD (attention deficit hyperactivity disorder)      Bipolar 1 disorder (H)      Depression      Gastro-oesophageal reflux disease      Hyperlipidaemia      Hypertension      Impaired fasting glucose      Nephrolithiasis      Obesity      Reactive airway disease     uses albuterol inhaler when has cold     Renal calcification      Rosacea      Tobacco abuse      Trigeminal neuralgia      Uncomplicated asthma        PAST SURGICAL HISTORY:  Past Surgical History:   Procedure Laterality Date     COMBINED CYSTOSCOPY, INSERT CATHETER URETER Left 3/25/2016    Procedure: COMBINED CYSTOSCOPY, INSERT CATHETER URETER;  Surgeon: Jorden Villafana MD;  Location:  OR     COMBINED CYSTOSCOPY, RETROGRADES, URETEROSCOPY, LASER HOLMIUM LITHOTRIPSY URETER(S), INSERT STENT Left 4/13/2016    Procedure: COMBINED CYSTOSCOPY, RETROGRADES, URETEROSCOPY, LASER HOLMIUM LITHOTRIPSY URETER(S), INSERT STENT;  Surgeon: Jorden Villafana MD;  Location:  OR     EXTRACORPOREAL SHOCK WAVE LITHOTRIPSY (ESWL) Left 3/25/2016    Procedure: EXTRACORPOREAL SHOCK WAVE LITHOTRIPSY (ESWL);  Surgeon: Jorden Villafana MD;  Location:  OR     EXTRACORPOREAL SHOCK WAVE LITHOTRIPSY, CYSTOSCOPY, INSERT STENT URETER(S), COMBINED Left 3/25/2016    Procedure: COMBINED EXTRACORPOREAL SHOCK WAVE LITHOTRIPSY, CYSTOSCOPY, INSERT STENT URETER(S);  Surgeon: Jorden Villafana MD;  Location:  OR     GENITOURINARY SURGERY  3 25 2016     Lithotripsy and stent placement     TONSILLECTOMY         FAMILY HISTORY:  Family History   Problem Relation Age of Onset     Psychotic Disorder Mother       "DIABETES Sister      Psychotic Disorder Sister        SOCIAL HISTORY:  Social History     Social History     Marital status:      Spouse name: N/A     Number of children: N/A     Years of education: N/A     Social History Main Topics     Smoking status: Former Smoker     Packs/day: 0.50     Types: Cigarettes     Smokeless tobacco: Never Used     Alcohol use No     Drug use: No     Sexual activity: Not Currently     Other Topics Concern      Service No     Blood Transfusions No     Caffeine Concern No     Occupational Exposure No     Hobby Hazards No     Sleep Concern Yes     Stress Concern No     Weight Concern Yes     Special Diet No     low sodium     Back Care No     Exercise No     Seat Belt Yes     Self-Exams Yes     Social History Narrative        3 children    Working at [employer]       Review of Systems:  Skin:  Negative     Eyes:  Positive for    ENT:  Negative    Respiratory:  Negative    Cardiovascular:  Negative for;palpitations;chest pain;edema;lightheadedness;dizziness fatigue;Positive for  Gastroenterology: Negative    Genitourinary:  Negative    Musculoskeletal:  Positive for foot pain  Neurologic:  Negative    Psychiatric:  Positive for sleep disturbances  Heme/Lymph/Imm:  Negative    Endocrine:  Negative      Physical Exam:  Vitals: /82 (BP Location: Right arm, Patient Position: Fowlers, Cuff Size: Adult Large)  Pulse 88  Ht 1.753 m (5' 9\")  Wt 113 kg (249 lb 1.6 oz)  SpO2 98%  BMI 36.79 kg/m2   Please refer to dictation for physical exam    Recent Lab Results:  LIPID RESULTS:  Lab Results   Component Value Date    CHOL 257 (H) 02/22/2017    HDL 23 (L) 02/22/2017    LDL  02/22/2017     Cannot estimate LDL when triglyceride exceeds 400 mg/dL    TRIG 2016 (H) 02/22/2017    CHOLHDLRATIO 6.5 (H) 01/13/2015       LIVER ENZYME RESULTS:  Lab Results   Component Value Date    AST 20 02/13/2017    ALT 33 02/13/2017       CBC RESULTS:  Lab Results   Component Value Date    " WBC 10.5 02/22/2017    RBC 4.69 02/22/2017    HGB 14.3 02/22/2017    HCT 41.0 02/22/2017    MCV 87 02/22/2017    MCH 30.5 02/22/2017    MCHC 34.9 02/22/2017    RDW 13.5 02/22/2017     02/22/2017       BMP RESULTS:  Lab Results   Component Value Date     02/22/2017    POTASSIUM 4.5 02/22/2017    CHLORIDE 102 02/22/2017    CO2 25 02/22/2017    ANIONGAP 9 02/22/2017     (H) 02/22/2017    BUN 23 02/22/2017    CR 0.96 02/22/2017    GFRESTIMATED 84 02/22/2017    GFRESTBLACK >90   GFR Calc   02/22/2017    LÁZARO 8.2 (L) 02/22/2017        A1C RESULTS:  Lab Results   Component Value Date    A1C 6.1 (H) 12/18/2015       INR RESULTS:  No results found for: INR    Sincerely,     Rose Durham PA-C

## 2017-03-23 NOTE — NURSING NOTE
"Chief Complaint   Patient presents with     RECHECK     medication and talk about his heart        Initial /76 (BP Location: Right arm, Patient Position: Chair, Cuff Size: Adult Regular)  Pulse 89  Temp 97.5  F (36.4  C) (Temporal)  Resp 20  Wt 247 lb 14.4 oz (112.4 kg)  SpO2 98%  BMI 36.61 kg/m2 Estimated body mass index is 36.61 kg/(m^2) as calculated from the following:    Height as of an earlier encounter on 3/23/17: 5' 9\" (1.753 m).    Weight as of this encounter: 247 lb 14.4 oz (112.4 kg).  Medication Reconciliation: complete   Luiza Austin CMA     "

## 2017-03-23 NOTE — PATIENT INSTRUCTIONS
Thanks for coming into [facility] today.    We discussed: congratulations on all your healthy changes.      Medication changes:  Continue current medications.      We'll do fasting labs in 1 month to check your cholesterol.      Follow up: with me in about 3 months, please call sooner with concerns.        Please call the clinic at  [phone] with any questions or concerns and my our nurses will be happy to help.    Please call [phone] for scheduling.      Reminder: Please bring in all current medications, over the counter supplements and vitamin bottles to your next appointment.

## 2017-03-23 NOTE — MR AVS SNAPSHOT
After Visit Summary   3/23/2017    Cecil Vasquez    MRN: [id number]           Patient Information     Date Of Birth          1970        Visit Information        Provider Department      3/23/2017 12:20 PM Gonzalo Cole MD [facility]        Today's Diagnoses     Chronic pain syndrome    -  1    Coronary artery disease involving native coronary artery of native heart with unstable angina pectoris (H)        Neuropathy (H)           Follow-ups after your visit        Your next 10 appointments already scheduled     Mar 24, 2017  8:00 AM CDT   Treatment 60 with Skyla Cheek [facility] ([facility])    [address] [place] [address] Dr [place] [address]   [phone]            Mar 27, 2017  8:00 AM CDT   Treatment 60 with GURPREET Kearns   [facility] ([facility])    [address] [place] [address] Dr [place] [address]   [phone]            Mar 29, 2017  8:00 AM CDT   Treatment 60 with GURPREET Kearns   [facility] ([facility])    [address] [place] [address] [address] [place] [address]   [phone]            Mar 31, 2017  8:00 AM CDT   Treatment 60 with Skyla Cheek [facility] ([facility])    [address] [place] [address] Dr [place] [address]   [phone]            Apr 22, 2017  8:00 AM CDT   LAB with [facility] ([facility])    [address] [facility] [address]   [phone]           Patient must bring picture ID.  Patient should be prepared to give a urine specimen  Please do not eat 10-12 hours before your appointment if you are coming in fasting for labs on lipids, cholesterol, or glucose (sugar).  Pregnant women should follow their Care Team instructions. Water with medications is okay. Do not drink coffee or other fluids.   If you have concerns about taking  your  "medications, please ask at office or if scheduling via [employer], send a message by clicking on Secure Messaging, Message Your Care Team.            Balta 15, 2017 10:00 AM CDT   [facility] Rose Durham PA-C   [facility]    [address] [place] [address] [phone]              Future tests that were ordered for you today     Open Future Orders        Priority Expected Expires Ordered    ALT Routine 4/21/2017 9/19/2017 3/23/2017    Lipid Profile Routine 4/21/2017 9/19/2017 3/23/2017    Basic metabolic panel Routine 4/22/2017 3/23/2018 3/23/2017    Follow-Up with Cardiac Advanced Practice Provider Routine 6/21/2017 3/23/2018 3/23/2017    Basic metabolic panel Routine 6/21/2017 3/23/2018 3/23/2017    Lipid Profile Routine 6/21/2017 3/23/2018 3/23/2017    ALT Routine 6/21/2017 3/23/2018 3/23/2017            Who to contact     If you have questions or need follow up information about today's clinic visit or your schedule please contact [facility] directly at [phone].  Normal or non-critical lab and imaging results will be communicated to you by [employer], letter or phone within 4 business days after the clinic has received the results. If you do not hear from us within 7 days, please contact the clinic through [employer] or phone. If you have a critical or abnormal lab result, we will notify you by phone as soon as possible.  Submit refill requests through [employer] or call your pharmacy and they will forward the refill request to us. Please allow 3 business days for your refill to be completed.          Additional Information About Your Visit        [employer] Information     [employer] lets you send messages to your doctor, view your test results, renew your prescriptions, schedule appointments and more. To sign up, go to [email or web address] . Click on \"Log in\" on the left side of the screen, which will take you to the Welcome " "page. Then click on \"Sign up Now\" on the right side of the page.     You will be asked to enter the access code listed below, as well as some personal information. Please follow the directions to create your username and password.     Your access code is: [email or web address]  Expires: 2017  9:49 PM     Your access code will  in 90 days. If you need help or a new code, please call your [facility] or [phone].        Care EveryWhere ID     This is your Care EveryWhere ID. This could be used by other organizations to access your [facility] medical records  [id number]        Your Vitals Were     Pulse Temperature Respirations Pulse Oximetry BMI (Body Mass Index)       89 97.5  F (36.4  C) (Temporal) 20 98% 36.61 kg/m2        Blood Pressure from Last 3 Encounters:   17 122/76   17 118/82   17 150/86    Weight from Last 3 Encounters:   17 247 lb 14.4 oz (112.4 kg)   17 249 lb 1.6 oz (113 kg)   17 257 lb (116.6 kg)              We Performed the Following     Pain Drug Screen, urine with reported meds          Today's Medication Changes          These changes are accurate as of: 3/23/17  3:17 PM.  If you have any questions, ask your nurse or doctor.               Start taking these medicines.        Dose/Directions    nitroglycerin 0.4 MG sublingual tablet   Commonly known as:  NITROSTAT   Used for:  Coronary artery disease involving native coronary artery of native heart with unstable angina pectoris (H)   Started by:  Gonzalo Cole MD        For chest pain place 1 tablet under the tongue every 5 minutes for 3 doses. If symptoms persist 5 minutes after 1st dose call 911.   Quantity:  25 tablet   Refills:  3       oxyCODONE 5 MG IR tablet   Commonly known as:  ROXICODONE   Used for:  Neuropathy (H)   Started by:  Gonzalo Cole MD        Dose:  5 mg   Take 1 tablet (5 mg) by mouth every 4 hours as needed for pain   Quantity:  60 tablet   Refills:  0       "   These medicines have changed or have updated prescriptions.        Dose/Directions    * NEURONTIN PO   This may have changed:  Another medication with the same name was added. Make sure you understand how and when to take each.        Dose:  1200 mg   Take 1,200 mg by mouth At Bedtime   Refills:  0       * gabapentin 600 MG tablet   Commonly known as:  NEURONTIN   This may have changed:  Another medication with the same name was added. Make sure you understand how and when to take each.   Used for:  Neuropathy (H)   Changed by:  Gonzalo Cole MD        Am & lunchtime.   Quantity:  240 tablet   Refills:  1       * gabapentin 300 MG capsule   Commonly known as:  NEURONTIN   This may have changed:  You were already taking a medication with the same name, and this prescription was added. Make sure you understand how and when to take each.   Used for:  Neuropathy (H)   Changed by:  Gonzalo Cole MD        Add 300mg to morning dose for 900mg in am. If not improved, then add 300 to midday dose for total of 900mg   Quantity:  30 capsule   Refills:  0       * Notice:  This list has 3 medication(s) that are the same as other medications prescribed for you. Read the directions carefully, and ask your doctor or other care provider to review them with you.      Stop taking these medicines if you haven't already. Please contact your care team if you have questions.     acetaminophen 325 MG tablet   Commonly known as:  TYLENOL   Stopped by:  Rose Durham PA-C           HYDROcodone-acetaminophen 7.5-325 MG per tablet   Commonly known as:  NORCO   Stopped by:  Gonzalo Cole MD                Where to get your medicines      These medications were sent to [facility] [place] [address] [facility]   [address] [facility] , [facility] [address]     Phone:  [phone]     gabapentin 300 MG capsule    lisinopril 10 MG tablet    metoprolol 50 MG 24 hr tablet    nitroglycerin 0.4 MG  sublingual tablet    prasugrel 10 MG Tabs tablet    rosuvastatin 40 MG tablet         Some of these will need a paper prescription and others can be bought over the counter.  Ask your nurse if you have questions.     Bring a paper prescription for each of these medications     oxyCODONE 5 MG IR tablet    traMADol 50 MG tablet                Primary Care Provider Office Phone # Fax #    Gonzalo Cole [phone]       [address] [place] [address]        Thank you!     Thank you for choosing [facility]  for your care. Our goal is always to provide you with excellent care. Hearing back from our patients is one way we can continue to improve our services. Please take a few minutes to complete the written survey that you may receive in the mail after your visit with us. Thank you!             Your Updated Medication List - Protect others around you: Learn how to safely use, store and throw away your medicines at www.disposemymeds.org.          This list is accurate as of: 3/23/17  3:17 PM.  Always use your most recent med list.                   Brand Name Dispense Instructions for use    aspirin 81 MG EC tablet     90 tablet    Take 1 tablet (81 mg) by mouth daily       ipratropium - albuterol 0.5 mg/2.5 mg/3 mL 0.5-2.5 (3) MG/3ML neb solution    DUONEB    30 vial    Take 1 vial (3 mLs) by nebulization every 4 hours as needed for shortness of breath / dyspnea       lisinopril 10 MG tablet    PRINIVIL/ZESTRIL    90 tablet    Take 1 tablet (10 mg) by mouth daily       melatonin 3 MG tablet     30 tablet    Take 1 tablet (3 mg) by mouth nightly as needed for sleep       metoprolol 50 MG 24 hr tablet    TOPROL-XL    90 tablet    Take 1 tablet (50 mg) by mouth daily       * NEURONTIN PO      Take 1,200 mg by mouth At Bedtime       * gabapentin 600 MG tablet    NEURONTIN    240 tablet    Am & lunchtime.       * gabapentin 300 MG capsule    NEURONTIN     30 capsule    Add 300mg to morning dose for 900mg in am. If not improved, then add 300 to midday dose for total of 900mg       nitroglycerin 0.4 MG sublingual tablet    NITROSTAT    25 tablet    For chest pain place 1 tablet under the tongue every 5 minutes for 3 doses. If symptoms persist 5 minutes after 1st dose call 911.       oxyCODONE 5 MG IR tablet    ROXICODONE    60 tablet    Take 1 tablet (5 mg) by mouth every 4 hours as needed for pain       prasugrel 10 MG Tabs tablet    EFFIENT    90 tablet    Take 1 tablet (10 mg) by mouth daily       rosuvastatin 40 MG tablet    CRESTOR    90 tablet    Take 1 tablet (40 mg) by mouth daily       traMADol 50 MG tablet    ULTRAM    120 tablet    Take 1-2 tablets ( mg) by mouth every 6 hours as needed for moderate pain       VENTOLIN  (90 BASE) MCG/ACT Inhaler   Generic drug:  albuterol     18 g    INHALE 2 PUFFS INTO THE LUNGS EVERY 4 HOURS AS NEEDED FOR SHORTNESS OF BREATH OR DIFFICULTY BREATHING       * Notice:  This list has 3 medication(s) that are the same as other medications prescribed for you. Read the directions carefully, and ask your doctor or other care provider to review them with you.

## 2017-03-26 LAB — PAIN DRUG SCR UR W RPTD MEDS: NORMAL

## 2017-03-27 NOTE — PROGRESS NOTES
Cardiac Rehab Discharge Summary    Reason for discharge:    Patient has not shown for scheduled sessions. Voice mail was left x3 with no return call.    Progress towards goals:  Goals not met.  Barriers to achieving goals:   discharge from facility.    Recommendation(s):    No further therapy is recommended.

## 2017-03-27 NOTE — ADDENDUM NOTE
Encounter addended by: Divina De La Rosa EP on: 3/27/2017 10:10 AM<BR>     Actions taken: Sign clinical note, Episode resolved

## 2017-04-04 ENCOUNTER — TELEPHONE (OUTPATIENT)
Dept: FAMILY MEDICINE | Facility: CLINIC | Age: 47
End: 2017-04-04

## 2017-04-04 DIAGNOSIS — G89.4 CHRONIC PAIN SYNDROME: Primary | ICD-10-CM

## 2017-04-04 NOTE — TELEPHONE ENCOUNTER
Patient was called and asked to repeat his urine drug screen, he stated he would come in tomorrow and get that done.   No further action needed. A future UDS was ordered.    Luiza Austin CMA

## 2017-04-05 DIAGNOSIS — G50.0 TRIGEMINAL NEURALGIA: ICD-10-CM

## 2017-04-05 DIAGNOSIS — G89.4 CHRONIC PAIN SYNDROME: ICD-10-CM

## 2017-04-05 DIAGNOSIS — I25.110 CORONARY ARTERY DISEASE INVOLVING NATIVE CORONARY ARTERY OF NATIVE HEART WITH UNSTABLE ANGINA PECTORIS (H): ICD-10-CM

## 2017-04-05 DIAGNOSIS — I10 ESSENTIAL HYPERTENSION WITH GOAL BLOOD PRESSURE LESS THAN 140/90: ICD-10-CM

## 2017-04-05 PROCEDURE — 80307 DRUG TEST PRSMV CHEM ANLYZR: CPT | Mod: 90 | Performed by: FAMILY MEDICINE

## 2017-04-05 PROCEDURE — 99000 SPECIMEN HANDLING OFFICE-LAB: CPT | Performed by: FAMILY MEDICINE

## 2017-04-07 NOTE — TELEPHONE ENCOUNTER
metoprolol (TOPROL-XL) 50 MG 24 hr tablet was filled on 3/23.please deny.             acetaminophen (TYLENOL) 325 MG tablet -was discontinued on 3/23 by cardiology. Please deny.  Camila BELL MA

## 2017-04-09 LAB — PAIN DRUG SCR UR W RPTD MEDS: NORMAL

## 2017-04-10 RX ORDER — METOPROLOL SUCCINATE 25 MG/1
TABLET, EXTENDED RELEASE ORAL
Qty: 30 TABLET | Refills: 1 | OUTPATIENT
Start: 2017-04-10

## 2017-04-11 ENCOUNTER — APPOINTMENT (OUTPATIENT)
Dept: GENERAL RADIOLOGY | Facility: CLINIC | Age: 47
End: 2017-04-11
Attending: EMERGENCY MEDICINE
Payer: COMMERCIAL

## 2017-04-11 ENCOUNTER — HOSPITAL ENCOUNTER (OUTPATIENT)
Facility: CLINIC | Age: 47
Setting detail: OBSERVATION
Discharge: HOME | End: 2017-04-12
Attending: EMERGENCY MEDICINE | Admitting: FAMILY MEDICINE
Payer: COMMERCIAL

## 2017-04-11 DIAGNOSIS — R07.9 ACUTE CHEST PAIN: ICD-10-CM

## 2017-04-11 DIAGNOSIS — G62.9 NEUROPATHY: ICD-10-CM

## 2017-04-11 DIAGNOSIS — I95.9: ICD-10-CM

## 2017-04-11 DIAGNOSIS — I25.110 CORONARY ARTERY DISEASE INVOLVING NATIVE CORONARY ARTERY OF NATIVE HEART WITH UNSTABLE ANGINA PECTORIS (H): ICD-10-CM

## 2017-04-11 DIAGNOSIS — I95.9 HYPOTENSION, UNSPECIFIED: ICD-10-CM

## 2017-04-11 PROBLEM — I25.10 CORONARY ARTERY DISEASE INVOLVING NATIVE CORONARY ARTERY OF NATIVE HEART: Status: ACTIVE | Noted: 2017-02-28

## 2017-04-11 PROBLEM — G50.0 TRIGEMINAL NEURALGIA: Status: ACTIVE | Noted: 2017-04-11

## 2017-04-11 LAB
ALBUMIN SERPL-MCNC: 4.1 G/DL (ref 3.4–5)
ALP SERPL-CCNC: 77 U/L (ref 40–150)
ALT SERPL W P-5'-P-CCNC: 34 U/L (ref 0–70)
ANION GAP SERPL CALCULATED.3IONS-SCNC: 10 MMOL/L (ref 3–14)
AST SERPL W P-5'-P-CCNC: 16 U/L (ref 0–45)
BASOPHILS # BLD AUTO: 0 10E9/L (ref 0–0.2)
BASOPHILS NFR BLD AUTO: 0.1 %
BILIRUB SERPL-MCNC: 0.5 MG/DL (ref 0.2–1.3)
BUN SERPL-MCNC: 20 MG/DL (ref 7–30)
CALCIUM SERPL-MCNC: 9.2 MG/DL (ref 8.5–10.1)
CHLORIDE SERPL-SCNC: 103 MMOL/L (ref 94–109)
CO2 SERPL-SCNC: 27 MMOL/L (ref 20–32)
CREAT SERPL-MCNC: 0.87 MG/DL (ref 0.66–1.25)
D DIMER PPP FEU-MCNC: 0.3 UG/ML FEU (ref 0–0.5)
DIFFERENTIAL METHOD BLD: NORMAL
EOSINOPHIL # BLD AUTO: 0.1 10E9/L (ref 0–0.7)
EOSINOPHIL NFR BLD AUTO: 1.5 %
ERYTHROCYTE [DISTWIDTH] IN BLOOD BY AUTOMATED COUNT: 13 % (ref 10–15)
GFR SERPL CREATININE-BSD FRML MDRD: ABNORMAL ML/MIN/1.7M2
GLUCOSE SERPL-MCNC: 110 MG/DL (ref 70–99)
HCT VFR BLD AUTO: 41.3 % (ref 40–53)
HGB BLD-MCNC: 13.9 G/DL (ref 13.3–17.7)
IMM GRANULOCYTES # BLD: 0.1 10E9/L (ref 0–0.4)
IMM GRANULOCYTES NFR BLD: 0.9 %
LIPASE SERPL-CCNC: 238 U/L (ref 73–393)
LYMPHOCYTES # BLD AUTO: 1.9 10E9/L (ref 0.8–5.3)
LYMPHOCYTES NFR BLD AUTO: 24.9 %
MCH RBC QN AUTO: 28.6 PG (ref 26.5–33)
MCHC RBC AUTO-ENTMCNC: 33.7 G/DL (ref 31.5–36.5)
MCV RBC AUTO: 85 FL (ref 78–100)
MONOCYTES # BLD AUTO: 0.6 10E9/L (ref 0–1.3)
MONOCYTES NFR BLD AUTO: 7.7 %
NEUTROPHILS # BLD AUTO: 4.9 10E9/L (ref 1.6–8.3)
NEUTROPHILS NFR BLD AUTO: 64.9 %
PLATELET # BLD AUTO: 217 10E9/L (ref 150–450)
POTASSIUM SERPL-SCNC: 4.3 MMOL/L (ref 3.4–5.3)
PROT SERPL-MCNC: 7.9 G/DL (ref 6.8–8.8)
RBC # BLD AUTO: 4.86 10E12/L (ref 4.4–5.9)
SODIUM SERPL-SCNC: 140 MMOL/L (ref 133–144)
TROPONIN I SERPL-MCNC: NORMAL UG/L (ref 0–0.04)
WBC # BLD AUTO: 7.6 10E9/L (ref 4–11)

## 2017-04-11 PROCEDURE — 25000128 H RX IP 250 OP 636: Performed by: FAMILY MEDICINE

## 2017-04-11 PROCEDURE — 99285 EMERGENCY DEPT VISIT HI MDM: CPT | Mod: 25 | Performed by: EMERGENCY MEDICINE

## 2017-04-11 PROCEDURE — 80053 COMPREHEN METABOLIC PANEL: CPT | Performed by: EMERGENCY MEDICINE

## 2017-04-11 PROCEDURE — 96375 TX/PRO/DX INJ NEW DRUG ADDON: CPT

## 2017-04-11 PROCEDURE — G0378 HOSPITAL OBSERVATION PER HR: HCPCS

## 2017-04-11 PROCEDURE — 25000128 H RX IP 250 OP 636: Performed by: EMERGENCY MEDICINE

## 2017-04-11 PROCEDURE — 25000132 ZZH RX MED GY IP 250 OP 250 PS 637: Performed by: FAMILY MEDICINE

## 2017-04-11 PROCEDURE — 99207 ZZC CDG-MDM COMPONENT: MEETS HIGH - UP CODED: CPT | Performed by: FAMILY MEDICINE

## 2017-04-11 PROCEDURE — 25000132 ZZH RX MED GY IP 250 OP 250 PS 637: Performed by: EMERGENCY MEDICINE

## 2017-04-11 PROCEDURE — 85379 FIBRIN DEGRADATION QUANT: CPT | Performed by: EMERGENCY MEDICINE

## 2017-04-11 PROCEDURE — 85025 COMPLETE CBC W/AUTO DIFF WBC: CPT | Performed by: EMERGENCY MEDICINE

## 2017-04-11 PROCEDURE — 96376 TX/PRO/DX INJ SAME DRUG ADON: CPT

## 2017-04-11 PROCEDURE — 83690 ASSAY OF LIPASE: CPT | Performed by: EMERGENCY MEDICINE

## 2017-04-11 PROCEDURE — 84484 ASSAY OF TROPONIN QUANT: CPT | Performed by: EMERGENCY MEDICINE

## 2017-04-11 PROCEDURE — 36415 COLL VENOUS BLD VENIPUNCTURE: CPT | Performed by: FAMILY MEDICINE

## 2017-04-11 PROCEDURE — 25000125 ZZHC RX 250: Performed by: EMERGENCY MEDICINE

## 2017-04-11 PROCEDURE — 84484 ASSAY OF TROPONIN QUANT: CPT | Performed by: FAMILY MEDICINE

## 2017-04-11 PROCEDURE — 99220 ZZC INITIAL OBSERVATION CARE,LEVL III: CPT | Performed by: FAMILY MEDICINE

## 2017-04-11 PROCEDURE — 96374 THER/PROPH/DIAG INJ IV PUSH: CPT

## 2017-04-11 PROCEDURE — 71010 XR CHEST PORT 1 VW: CPT | Mod: TC

## 2017-04-11 PROCEDURE — 99285 EMERGENCY DEPT VISIT HI MDM: CPT | Mod: 25

## 2017-04-11 RX ORDER — ACETAMINOPHEN 325 MG/1
650 TABLET ORAL EVERY 4 HOURS PRN
Status: DISCONTINUED | OUTPATIENT
Start: 2017-04-11 | End: 2017-04-11

## 2017-04-11 RX ORDER — LORAZEPAM 2 MG/ML
.5-1 INJECTION INTRAMUSCULAR EVERY 4 HOURS PRN
Status: DISCONTINUED | OUTPATIENT
Start: 2017-04-11 | End: 2017-04-12 | Stop reason: HOSPADM

## 2017-04-11 RX ORDER — ASPIRIN 81 MG/1
81 TABLET ORAL DAILY
Status: DISCONTINUED | OUTPATIENT
Start: 2017-04-12 | End: 2017-04-12 | Stop reason: HOSPADM

## 2017-04-11 RX ORDER — LORAZEPAM 0.5 MG/1
.5-1 TABLET ORAL EVERY 4 HOURS PRN
Status: DISCONTINUED | OUTPATIENT
Start: 2017-04-11 | End: 2017-04-12 | Stop reason: HOSPADM

## 2017-04-11 RX ORDER — SODIUM CHLORIDE 9 MG/ML
INJECTION, SOLUTION INTRAVENOUS CONTINUOUS
Status: DISCONTINUED | OUTPATIENT
Start: 2017-04-11 | End: 2017-04-12 | Stop reason: HOSPADM

## 2017-04-11 RX ORDER — GABAPENTIN 400 MG/1
1200 CAPSULE ORAL AT BEDTIME
Status: DISCONTINUED | OUTPATIENT
Start: 2017-04-11 | End: 2017-04-12 | Stop reason: HOSPADM

## 2017-04-11 RX ORDER — TRAMADOL HYDROCHLORIDE 50 MG/1
50-100 TABLET ORAL EVERY 6 HOURS PRN
Status: DISCONTINUED | OUTPATIENT
Start: 2017-04-11 | End: 2017-04-12 | Stop reason: HOSPADM

## 2017-04-11 RX ORDER — NITROGLYCERIN 0.4 MG/1
0.4 TABLET SUBLINGUAL EVERY 5 MIN PRN
Status: DISCONTINUED | OUTPATIENT
Start: 2017-04-11 | End: 2017-04-12 | Stop reason: HOSPADM

## 2017-04-11 RX ORDER — MORPHINE SULFATE 4 MG/ML
4 INJECTION, SOLUTION INTRAMUSCULAR; INTRAVENOUS ONCE
Status: COMPLETED | OUTPATIENT
Start: 2017-04-11 | End: 2017-04-11

## 2017-04-11 RX ORDER — ONDANSETRON 4 MG/1
4 TABLET, ORALLY DISINTEGRATING ORAL EVERY 6 HOURS PRN
Status: DISCONTINUED | OUTPATIENT
Start: 2017-04-11 | End: 2017-04-12 | Stop reason: HOSPADM

## 2017-04-11 RX ORDER — ROSUVASTATIN CALCIUM 20 MG/1
40 TABLET, COATED ORAL DAILY
Status: DISCONTINUED | OUTPATIENT
Start: 2017-04-12 | End: 2017-04-12 | Stop reason: HOSPADM

## 2017-04-11 RX ORDER — ACETAMINOPHEN 325 MG/1
650 TABLET ORAL EVERY 4 HOURS PRN
Status: DISCONTINUED | OUTPATIENT
Start: 2017-04-11 | End: 2017-04-12 | Stop reason: HOSPADM

## 2017-04-11 RX ORDER — OXYCODONE HYDROCHLORIDE 5 MG/1
5 TABLET ORAL EVERY 4 HOURS PRN
Status: DISCONTINUED | OUTPATIENT
Start: 2017-04-11 | End: 2017-04-12 | Stop reason: HOSPADM

## 2017-04-11 RX ORDER — GABAPENTIN 300 MG/1
900 CAPSULE ORAL 2 TIMES DAILY
Status: DISCONTINUED | OUTPATIENT
Start: 2017-04-12 | End: 2017-04-12 | Stop reason: HOSPADM

## 2017-04-11 RX ORDER — ONDANSETRON 2 MG/ML
4 INJECTION INTRAMUSCULAR; INTRAVENOUS EVERY 6 HOURS PRN
Status: DISCONTINUED | OUTPATIENT
Start: 2017-04-11 | End: 2017-04-12 | Stop reason: HOSPADM

## 2017-04-11 RX ORDER — NALOXONE HYDROCHLORIDE 0.4 MG/ML
.1-.4 INJECTION, SOLUTION INTRAMUSCULAR; INTRAVENOUS; SUBCUTANEOUS
Status: DISCONTINUED | OUTPATIENT
Start: 2017-04-11 | End: 2017-04-12 | Stop reason: HOSPADM

## 2017-04-11 RX ORDER — NALOXONE HYDROCHLORIDE 0.4 MG/ML
.1-.4 INJECTION, SOLUTION INTRAMUSCULAR; INTRAVENOUS; SUBCUTANEOUS
Status: DISCONTINUED | OUTPATIENT
Start: 2017-04-11 | End: 2017-04-11

## 2017-04-11 RX ORDER — ALUMINA, MAGNESIA, AND SIMETHICONE 2400; 2400; 240 MG/30ML; MG/30ML; MG/30ML
15-30 SUSPENSION ORAL EVERY 4 HOURS PRN
Status: DISCONTINUED | OUTPATIENT
Start: 2017-04-11 | End: 2017-04-12 | Stop reason: HOSPADM

## 2017-04-11 RX ORDER — LORAZEPAM 2 MG/ML
1 INJECTION INTRAMUSCULAR ONCE
Status: COMPLETED | OUTPATIENT
Start: 2017-04-11 | End: 2017-04-11

## 2017-04-11 RX ORDER — MORPHINE SULFATE 4 MG/ML
4 INJECTION, SOLUTION INTRAMUSCULAR; INTRAVENOUS
Status: DISCONTINUED | OUTPATIENT
Start: 2017-04-11 | End: 2017-04-12 | Stop reason: HOSPADM

## 2017-04-11 RX ORDER — PROCHLORPERAZINE 25 MG
25 SUPPOSITORY, RECTAL RECTAL EVERY 12 HOURS PRN
Status: DISCONTINUED | OUTPATIENT
Start: 2017-04-11 | End: 2017-04-12 | Stop reason: HOSPADM

## 2017-04-11 RX ORDER — HYDROMORPHONE HYDROCHLORIDE 1 MG/ML
.3-.5 INJECTION, SOLUTION INTRAMUSCULAR; INTRAVENOUS; SUBCUTANEOUS
Status: DISCONTINUED | OUTPATIENT
Start: 2017-04-11 | End: 2017-04-11

## 2017-04-11 RX ORDER — ASPIRIN 81 MG/1
324 TABLET, CHEWABLE ORAL ONCE
Status: COMPLETED | OUTPATIENT
Start: 2017-04-11 | End: 2017-04-11

## 2017-04-11 RX ORDER — METOCLOPRAMIDE HYDROCHLORIDE 5 MG/ML
10 INJECTION INTRAMUSCULAR; INTRAVENOUS EVERY 6 HOURS PRN
Status: DISCONTINUED | OUTPATIENT
Start: 2017-04-11 | End: 2017-04-12 | Stop reason: HOSPADM

## 2017-04-11 RX ORDER — ONDANSETRON 2 MG/ML
4 INJECTION INTRAMUSCULAR; INTRAVENOUS EVERY 30 MIN PRN
Status: DISCONTINUED | OUTPATIENT
Start: 2017-04-11 | End: 2017-04-11

## 2017-04-11 RX ORDER — HYDROCODONE BITARTRATE AND ACETAMINOPHEN 5; 325 MG/1; MG/1
1-2 TABLET ORAL EVERY 4 HOURS PRN
Status: DISCONTINUED | OUTPATIENT
Start: 2017-04-11 | End: 2017-04-11

## 2017-04-11 RX ORDER — LISINOPRIL 10 MG/1
10 TABLET ORAL DAILY
Status: DISCONTINUED | OUTPATIENT
Start: 2017-04-12 | End: 2017-04-12 | Stop reason: HOSPADM

## 2017-04-11 RX ORDER — IBUPROFEN 600 MG/1
600 TABLET, FILM COATED ORAL EVERY 6 HOURS PRN
Status: DISCONTINUED | OUTPATIENT
Start: 2017-04-11 | End: 2017-04-11

## 2017-04-11 RX ORDER — FENTANYL CITRATE 50 UG/ML
50-100 INJECTION, SOLUTION INTRAMUSCULAR; INTRAVENOUS
Status: DISCONTINUED | OUTPATIENT
Start: 2017-04-11 | End: 2017-04-11

## 2017-04-11 RX ORDER — ACETAMINOPHEN 325 MG/1
TABLET ORAL
Qty: 100 TABLET | Refills: 11 | Status: ON HOLD | OUTPATIENT
Start: 2017-04-11 | End: 2019-10-16

## 2017-04-11 RX ORDER — PROCHLORPERAZINE MALEATE 5 MG
5-10 TABLET ORAL EVERY 6 HOURS PRN
Status: DISCONTINUED | OUTPATIENT
Start: 2017-04-11 | End: 2017-04-12 | Stop reason: HOSPADM

## 2017-04-11 RX ORDER — METOCLOPRAMIDE 5 MG/1
10 TABLET ORAL EVERY 6 HOURS PRN
Status: DISCONTINUED | OUTPATIENT
Start: 2017-04-11 | End: 2017-04-12 | Stop reason: HOSPADM

## 2017-04-11 RX ORDER — SODIUM CHLORIDE 9 MG/ML
1000 INJECTION, SOLUTION INTRAVENOUS CONTINUOUS
Status: DISCONTINUED | OUTPATIENT
Start: 2017-04-11 | End: 2017-04-11 | Stop reason: ALTCHOICE

## 2017-04-11 RX ORDER — KETOROLAC TROMETHAMINE 30 MG/ML
30 INJECTION, SOLUTION INTRAMUSCULAR; INTRAVENOUS ONCE
Status: COMPLETED | OUTPATIENT
Start: 2017-04-11 | End: 2017-04-11

## 2017-04-11 RX ORDER — PRASUGREL 10 MG/1
10 TABLET, FILM COATED ORAL DAILY
Status: DISCONTINUED | OUTPATIENT
Start: 2017-04-12 | End: 2017-04-12 | Stop reason: HOSPADM

## 2017-04-11 RX ADMIN — MORPHINE SULFATE 4 MG: 4 INJECTION, SOLUTION INTRAMUSCULAR; INTRAVENOUS at 23:59

## 2017-04-11 RX ADMIN — FENTANYL CITRATE 100 MCG: 50 INJECTION, SOLUTION INTRAMUSCULAR; INTRAVENOUS at 16:17

## 2017-04-11 RX ADMIN — LORAZEPAM 1 MG: 2 INJECTION INTRAMUSCULAR; INTRAVENOUS at 14:32

## 2017-04-11 RX ADMIN — FENTANYL CITRATE 100 MCG: 50 INJECTION, SOLUTION INTRAMUSCULAR; INTRAVENOUS at 18:22

## 2017-04-11 RX ADMIN — ASPIRIN 81 MG CHEWABLE TABLET 324 MG: 81 TABLET CHEWABLE at 14:03

## 2017-04-11 RX ADMIN — HYDROMORPHONE HYDROCHLORIDE 0.5 MG: 1 INJECTION, SOLUTION INTRAMUSCULAR; INTRAVENOUS; SUBCUTANEOUS at 19:43

## 2017-04-11 RX ADMIN — SODIUM CHLORIDE 1000 ML: 9 INJECTION, SOLUTION INTRAVENOUS at 14:04

## 2017-04-11 RX ADMIN — KETOROLAC TROMETHAMINE 30 MG: 30 INJECTION, SOLUTION INTRAMUSCULAR at 15:38

## 2017-04-11 RX ADMIN — OXYCODONE HYDROCHLORIDE 5 MG: 5 TABLET ORAL at 21:57

## 2017-04-11 RX ADMIN — MORPHINE SULFATE 4 MG: 4 INJECTION, SOLUTION INTRAMUSCULAR; INTRAVENOUS at 15:03

## 2017-04-11 RX ADMIN — GABAPENTIN 1200 MG: 400 CAPSULE ORAL at 21:58

## 2017-04-11 RX ADMIN — SODIUM CHLORIDE 1000 ML: 9 INJECTION, SOLUTION INTRAVENOUS at 15:03

## 2017-04-11 RX ADMIN — ONDANSETRON 4 MG: 2 INJECTION INTRAMUSCULAR; INTRAVENOUS at 14:11

## 2017-04-11 RX ADMIN — SODIUM CHLORIDE 1000 ML: 9 INJECTION, SOLUTION INTRAVENOUS at 16:18

## 2017-04-11 NOTE — LETTER
[address] MEDICAL SURGICAL  [address] [place] [address]  Phone: [phone]    April 12, 2017        Cecil Vasquez  [address]   [facility] [address]          To whom it may concern:    RE: Cecil Jacob was hospitalized overnight for evaluation of an acute condition from 4/11/17 through 4/12/17.  He is being discharged on 4/12/17 and should be able to return to work on 4/13/17 without restrictions.    Please contact me for questions or concerns.      Sincerely,        Gladys Burger MD

## 2017-04-11 NOTE — ED NOTES
Chest pain that has been building all AM. Not relieved with Nitro. Had cardiac stents a couple weeks ago. A little nauseated but no sweats. Feels like a weight on his chest. Has not taken his ASA for a couple days. Took a Nitro an hour ago without relief.

## 2017-04-11 NOTE — IP AVS SNAPSHOT
[address] [facility]    [address] [place] [address]    Phone:  [phone]                                       After Visit Summary   4/11/2017    Cecil Vasquez    MRN: [id number]           After Visit Summary Signature Page     I have received my discharge instructions, and my questions have been answered. I have discussed any challenges I see with this plan with the nurse or doctor.    ..........................................................................................................................................  Patient/Patient Representative Signature      ..........................................................................................................................................  Patient Representative Print Name and Relationship to Patient    ..................................................               ................................................  Date                                            Time    ..........................................................................................................................................  Reviewed by Signature/Title    ...................................................              ..............................................  Date                                                            Time

## 2017-04-11 NOTE — IP AVS SNAPSHOT
MRN:[id number]                      After Visit Summary   4/11/2017    Cecil Vasquez    MRN: [id number]           Thank you!     Thank you for choosing [facility] for your care. Our goal is always to provide you with excellent care. Hearing back from our patients is one way we can continue to improve our services. Please take a few minutes to complete the written survey that you may receive in the mail after you visit with us. Thank you!        Patient Information     Date Of Birth          1970        About your hospital stay     You were admitted on:  April 11, 2017 You last received care in the:  [address]    You were discharged on:  April 12, 2017        Reason for your hospital stay       Chest pain - given your recent heart history, you were placed in the hospital for observation and your heart was monitored in several ways.  Thankfully all of your troponins were negative (indicating no damage was being done to your heart) and your lexiscan stress test was also normal, indicating good blood flow to your heart even while under stress.  At this time, your chest pain does not prove to be heart related.  Please follow up next week with Dr. Cole as scheduled and keep close track of your symptoms between now and then as discussed.                  Who to Call     For medical emergencies, please call 911.  For non-urgent questions about your medical care, please call your primary care provider or clinic, [phone]          Attending Provider     Provider Specialty    Amy Casper MD Emergency Medicine    Aroda, Lui Sewell MD [facility]       Primary Care Provider Office Phone # Fax #    Fffg Kwame Cole [phone]       [address] [place] [address]        After Care Instructions     Activity       Your activity upon discharge:  activity as tolerated            Diet       Follow this diet upon discharge: Cardiac low salt diet                  Follow-up Appointments     Follow-up and recommended labs and tests        Follow up with primary care provider, Gonzalo Cole, within 8 days for hospital follow- up.                  Your next 10 appointments already scheduled     Apr 20, 2017 12:00 PM CDT   Office Visit with Gonzalo Cole MD   [address] [address]   [phone]           Bring a current list of meds and any records pertaining to this visit.  For Physicals, please bring immunization records and any forms needing to be filled out.  Please arrive 10 minutes early to complete paperwork.            Apr 22, 2017  8:00 AM CDT   LAB with [facility]   [address] [id number] [phone]           Patient must bring picture ID.  Patient should be prepared to give a urine specimen  Please do not eat 10-12 hours before your appointment if you are coming in fasting for labs on lipids, cholesterol, or glucose (sugar).  Pregnant women should follow their Care Team instructions. Water with medications is okay. Do not drink coffee or other fluids.   If you have concerns about taking  your medications, please ask at office or if scheduling via i-drive, send a message by clicking on Secure Messaging, Message Your Care Team.            Balta 15, 2017 10:00 AM CDT   Return Visit with Rose Durham PA-C   [address] [address]   [phone]              Pending Results     No orders found for last 3 day(s).            Statement of Approval     Ordered          04/12/17 1430  I have reviewed and agree with all the recommendations and orders detailed in this document.  EFFECTIVE NOW    "  Approved and electronically signed by:  Gladys Burger MD             Admission Information     Date & Time Provider Department Dept. Phone    2017 Gladys Burger MD [address] [facility] [phone]      Your Vitals Were     Blood Pressure Pulse Temperature Respirations Height Weight    103/69 70 97.1  F (36.2  C) (Oral) 20 1.753 m (5' 9\") 115 kg (253 lb 8.5 oz)    Pulse Oximetry BMI (Body Mass Index)                97% 37.44 kg/m2          MyChart Information     dotCloud lets you send messages to your doctor, view your test results, renew your prescriptions, schedule appointments and more. To sign up, go to [email or web address] . Click on \"Log in\" on the left side of the screen, which will take you to the Welcome page. Then click on \"Sign up Now\" on the right side of the page.     You will be asked to enter the access code listed below, as well as some personal information. Please follow the directions to create your username and password.     Your access code is: [email or web address]  Expires: 2017  9:49 PM     Your access code will  in 90 days. If you need help or a new code, please call your [place] clinic or [phone].        Care EveryWhere ID     This is your Care EveryWhere ID. This could be used by other organizations to access your [place] medical records  [id number]           Review of your medicines      START taking        Dose / Directions    MAPAP 325 MG tablet   Used for:  Trigeminal neuralgia   Generic drug:  acetaminophen        TAKE TWO TABLETS BY MOUTH EVERY 4 HOURS AS NEEDED FOR MILD PAIN   Quantity:  100 tablet   Refills:  11         CONTINUE these medicines which have NOT CHANGED        Dose / Directions    aspirin 81 MG EC tablet   Used for:  Coronary artery disease involving native coronary artery of native heart with unstable angina pectoris (H)        Dose:  81 mg   Take 1 tablet (81 mg) by mouth daily   Quantity:  90 tablet "   Refills:  0       ipratropium - albuterol 0.5 mg/2.5 mg/3 mL 0.5-2.5 (3) MG/3ML neb solution   Commonly known as:  DUONEB   Used for:  Asthma with acute exacerbation, unspecified asthma severity        Dose:  1 vial   Take 1 vial (3 mLs) by nebulization every 4 hours as needed for shortness of breath / dyspnea   Quantity:  30 vial   Refills:  0       lisinopril 10 MG tablet   Commonly known as:  PRINIVIL/ZESTRIL   Used for:  Essential hypertension with goal blood pressure less than 140/90, ACS (acute coronary syndrome) (H)        Dose:  10 mg   Take 1 tablet (10 mg) by mouth daily   Quantity:  90 tablet   Refills:  3       melatonin 3 MG tablet   Used for:  Trigeminal neuralgia        Dose:  3 mg   Take 1 tablet (3 mg) by mouth nightly as needed for sleep   Quantity:  30 tablet   Refills:  1       metoprolol 50 MG 24 hr tablet   Commonly known as:  TOPROL-XL   Used for:  Coronary artery disease involving native coronary artery of native heart with unstable angina pectoris (H)        Dose:  50 mg   Take 1 tablet (50 mg) by mouth daily   Quantity:  90 tablet   Refills:  3       * NEURONTIN PO        Dose:  1200 mg   Take 1,200 mg by mouth At Bedtime   Refills:  0       * gabapentin 600 MG tablet   Commonly known as:  NEURONTIN   Used for:  Neuropathy (H)        Am & lunchtime.   Quantity:  240 tablet   Refills:  1       * gabapentin 300 MG capsule   Commonly known as:  NEURONTIN   Used for:  Neuropathy (H)        Add 300mg to morning dose for 900mg in am. If not improved, then add 300 to midday dose for total of 900mg   Quantity:  30 capsule   Refills:  0       nitroglycerin 0.4 MG sublingual tablet   Commonly known as:  NITROSTAT   Used for:  Coronary artery disease involving native coronary artery of native heart with unstable angina pectoris (H)        For chest pain place 1 tablet under the tongue every 5 minutes for 3 doses. If symptoms persist 5 minutes after 1st dose call 911.   Quantity:  25 tablet    Refills:  3       oxyCODONE 5 MG IR tablet   Commonly known as:  ROXICODONE   Used for:  Neuropathy (H), Acute chest pain        Dose:  5 mg   Take 1 tablet (5 mg) by mouth every 4 hours as needed for pain   Quantity:  15 tablet   Refills:  0       prasugrel 10 MG Tabs tablet   Commonly known as:  EFFIENT   Used for:  ACS (acute coronary syndrome) (H), Coronary artery disease involving native coronary artery of native heart with unstable angina pectoris (H)        Dose:  10 mg   Take 1 tablet (10 mg) by mouth daily   Quantity:  90 tablet   Refills:  3       rosuvastatin 40 MG tablet   Commonly known as:  CRESTOR   Used for:  ACS (acute coronary syndrome) (H)        Dose:  40 mg   Take 1 tablet (40 mg) by mouth daily   Quantity:  90 tablet   Refills:  3       traMADol 50 MG tablet   Commonly known as:  ULTRAM   Used for:  Neuropathy (H)        Dose:   mg   Take 1-2 tablets ( mg) by mouth every 6 hours as needed for moderate pain   Quantity:  120 tablet   Refills:  0       VENTOLIN  (90 BASE) MCG/ACT Inhaler   Used for:  Asthma with acute exacerbation, unspecified asthma severity   Generic drug:  albuterol        INHALE 2 PUFFS INTO THE LUNGS EVERY 4 HOURS AS NEEDED FOR SHORTNESS OF BREATH OR DIFFICULTY BREATHING   Quantity:  18 g   Refills:  0       * Notice:  This list has 3 medication(s) that are the same as other medications prescribed for you. Read the directions carefully, and ask your doctor or other care provider to review them with you.         Where to get your medicines      These medications were sent to [facility] [place], [place] - [facility] [address]  [address], [facility] [address]     Phone:  [phone]     aspirin 81 MG EC tablet    MAPAP 325 MG tablet         Some of these will need a paper prescription and others can be bought over the counter. Ask your nurse if you have questions.     Bring a paper prescription for each of these medications     oxyCODONE 5  MG IR tablet                Protect others around you: Learn how to safely use, store and throw away your medicines at www.disposemymeds.org.             Medication List: This is a list of all your medications and when to take them. Check marks below indicate your daily home schedule. Keep this list as a reference.      Medications           Morning Afternoon Evening Bedtime As Needed    aspirin 81 MG EC tablet   Take 1 tablet (81 mg) by mouth daily   Last time this was given:  81 mg on 4/12/2017  8:44 AM                                   ipratropium - albuterol 0.5 mg/2.5 mg/3 mL 0.5-2.5 (3) MG/3ML neb solution   Commonly known as:  DUONEB   Take 1 vial (3 mLs) by nebulization every 4 hours as needed for shortness of breath / dyspnea                                   lisinopril 10 MG tablet   Commonly known as:  PRINIVIL/ZESTRIL   Take 1 tablet (10 mg) by mouth daily   Last time this was given:  10 mg on 4/12/2017  8:44 AM                                   MAPAP 325 MG tablet   TAKE TWO TABLETS BY MOUTH EVERY 4 HOURS AS NEEDED FOR MILD PAIN   Generic drug:  acetaminophen                                   melatonin 3 MG tablet   Take 1 tablet (3 mg) by mouth nightly as needed for sleep   Last time this was given:  3 mg on 4/12/2017 12:28 AM                                   metoprolol 50 MG 24 hr tablet   Commonly known as:  TOPROL-XL   Take 1 tablet (50 mg) by mouth daily                                   * NEURONTIN PO   Take 1,200 mg by mouth At Bedtime   Last time this was given:  900 mg on 4/12/2017  1:05 PM                                   * gabapentin 600 MG tablet   Commonly known as:  NEURONTIN   Am & lunchtime.   Last time this was given:  1,200 mg on 4/11/2017  9:58 PM                                      * gabapentin 300 MG capsule   Commonly known as:  NEURONTIN   Add 300mg to morning dose for 900mg in am. If not improved, then add 300 to midday dose for total of 900mg   Last time this was given:  900  mg on 4/12/2017  1:05 PM                                      nitroglycerin 0.4 MG sublingual tablet   Commonly known as:  NITROSTAT   For chest pain place 1 tablet under the tongue every 5 minutes for 3 doses. If symptoms persist 5 minutes after 1st dose call 911.                                   oxyCODONE 5 MG IR tablet   Commonly known as:  ROXICODONE   Take 1 tablet (5 mg) by mouth every 4 hours as needed for pain   Last time this was given:  5 mg on 4/12/2017  1:09 PM                                   prasugrel 10 MG Tabs tablet   Commonly known as:  EFFIENT   Take 1 tablet (10 mg) by mouth daily   Last time this was given:  10 mg on 4/12/2017  8:43 AM                                   rosuvastatin 40 MG tablet   Commonly known as:  CRESTOR   Take 1 tablet (40 mg) by mouth daily   Last time this was given:  40 mg on 4/12/2017  8:44 AM                                   traMADol 50 MG tablet   Commonly known as:  ULTRAM   Take 1-2 tablets ( mg) by mouth every 6 hours as needed for moderate pain   Last time this was given:  100 mg on 4/12/2017  8:44 AM                                   VENTOLIN  (90 BASE) MCG/ACT Inhaler   INHALE 2 PUFFS INTO THE LUNGS EVERY 4 HOURS AS NEEDED FOR SHORTNESS OF BREATH OR DIFFICULTY BREATHING   Generic drug:  albuterol                                   * Notice:  This list has 3 medication(s) that are the same as other medications prescribed for you. Read the directions carefully, and ask your doctor or other care provider to review them with you.

## 2017-04-12 ENCOUNTER — APPOINTMENT (OUTPATIENT)
Dept: NUCLEAR MEDICINE | Facility: CLINIC | Age: 47
End: 2017-04-12
Attending: FAMILY MEDICINE
Payer: COMMERCIAL

## 2017-04-12 VITALS
RESPIRATION RATE: 20 BRPM | TEMPERATURE: 97.1 F | BODY MASS INDEX: 37.55 KG/M2 | HEART RATE: 70 BPM | HEIGHT: 69 IN | OXYGEN SATURATION: 97 % | DIASTOLIC BLOOD PRESSURE: 69 MMHG | SYSTOLIC BLOOD PRESSURE: 103 MMHG | WEIGHT: 253.53 LBS

## 2017-04-12 LAB — TROPONIN I SERPL-MCNC: NORMAL UG/L (ref 0–0.04)

## 2017-04-12 PROCEDURE — 34300033 ZZH RX 343: Performed by: FAMILY MEDICINE

## 2017-04-12 PROCEDURE — 25000132 ZZH RX MED GY IP 250 OP 250 PS 637: Performed by: FAMILY MEDICINE

## 2017-04-12 PROCEDURE — 96376 TX/PRO/DX INJ SAME DRUG ADON: CPT

## 2017-04-12 PROCEDURE — 93018 CV STRESS TEST I&R ONLY: CPT | Performed by: INTERNAL MEDICINE

## 2017-04-12 PROCEDURE — 93017 CV STRESS TEST TRACING ONLY: CPT | Performed by: REHABILITATION PRACTITIONER

## 2017-04-12 PROCEDURE — 99217 ZZC OBSERVATION CARE DISCHARGE: CPT | Performed by: FAMILY MEDICINE

## 2017-04-12 PROCEDURE — G0378 HOSPITAL OBSERVATION PER HR: HCPCS

## 2017-04-12 PROCEDURE — 78452 HT MUSCLE IMAGE SPECT MULT: CPT | Mod: 26 | Performed by: INTERNAL MEDICINE

## 2017-04-12 PROCEDURE — 84484 ASSAY OF TROPONIN QUANT: CPT | Performed by: FAMILY MEDICINE

## 2017-04-12 PROCEDURE — 36415 COLL VENOUS BLD VENIPUNCTURE: CPT | Performed by: FAMILY MEDICINE

## 2017-04-12 PROCEDURE — 25000132 ZZH RX MED GY IP 250 OP 250 PS 637: Performed by: EMERGENCY MEDICINE

## 2017-04-12 PROCEDURE — 25000128 H RX IP 250 OP 636: Performed by: EMERGENCY MEDICINE

## 2017-04-12 PROCEDURE — 78452 HT MUSCLE IMAGE SPECT MULT: CPT

## 2017-04-12 PROCEDURE — 93016 CV STRESS TEST SUPVJ ONLY: CPT | Performed by: INTERNAL MEDICINE

## 2017-04-12 PROCEDURE — 25000128 H RX IP 250 OP 636: Performed by: FAMILY MEDICINE

## 2017-04-12 PROCEDURE — A9502 TC99M TETROFOSMIN: HCPCS | Performed by: FAMILY MEDICINE

## 2017-04-12 RX ORDER — REGADENOSON 0.08 MG/ML
0.4 INJECTION, SOLUTION INTRAVENOUS ONCE
Status: DISCONTINUED | OUTPATIENT
Start: 2017-04-12 | End: 2017-04-12 | Stop reason: HOSPADM

## 2017-04-12 RX ORDER — METOPROLOL TARTRATE 50 MG
50 TABLET ORAL ONCE
Status: COMPLETED | OUTPATIENT
Start: 2017-04-12 | End: 2017-04-12

## 2017-04-12 RX ORDER — OXYCODONE HYDROCHLORIDE 5 MG/1
5 TABLET ORAL EVERY 4 HOURS PRN
Qty: 15 TABLET | Refills: 0 | Status: SHIPPED | OUTPATIENT
Start: 2017-04-12 | End: 2017-05-18

## 2017-04-12 RX ADMIN — OXYCODONE HYDROCHLORIDE 5 MG: 5 TABLET ORAL at 04:13

## 2017-04-12 RX ADMIN — PRASUGREL HYDROCHLORIDE 10 MG: 10 TABLET, FILM COATED ORAL at 08:43

## 2017-04-12 RX ADMIN — LISINOPRIL 10 MG: 10 TABLET ORAL at 08:44

## 2017-04-12 RX ADMIN — ROSUVASTATIN CALCIUM 40 MG: 20 TABLET ORAL at 08:44

## 2017-04-12 RX ADMIN — TRAMADOL HYDROCHLORIDE 100 MG: 50 TABLET, COATED ORAL at 08:44

## 2017-04-12 RX ADMIN — OXYCODONE HYDROCHLORIDE 5 MG: 5 TABLET ORAL at 13:09

## 2017-04-12 RX ADMIN — GABAPENTIN 900 MG: 300 CAPSULE ORAL at 08:43

## 2017-04-12 RX ADMIN — LORAZEPAM 1 MG: 0.5 TABLET ORAL at 00:28

## 2017-04-12 RX ADMIN — TETROFOSMIN 37.7 MCI.: 0.23 INJECTION, POWDER, LYOPHILIZED, FOR SOLUTION INTRAVENOUS at 11:50

## 2017-04-12 RX ADMIN — OXYCODONE HYDROCHLORIDE 5 MG: 5 TABLET ORAL at 08:44

## 2017-04-12 RX ADMIN — GABAPENTIN 900 MG: 300 CAPSULE ORAL at 13:05

## 2017-04-12 RX ADMIN — MORPHINE SULFATE 4 MG: 4 INJECTION, SOLUTION INTRAMUSCULAR; INTRAVENOUS at 06:15

## 2017-04-12 RX ADMIN — ASPIRIN 81 MG: 81 TABLET ORAL at 08:44

## 2017-04-12 RX ADMIN — MORPHINE SULFATE 4 MG: 4 INJECTION, SOLUTION INTRAMUSCULAR; INTRAVENOUS at 10:04

## 2017-04-12 RX ADMIN — Medication 3 MG: at 00:28

## 2017-04-12 RX ADMIN — LORAZEPAM 1 MG: 0.5 TABLET ORAL at 14:36

## 2017-04-12 RX ADMIN — LORAZEPAM 1 MG: 0.5 TABLET ORAL at 08:44

## 2017-04-12 RX ADMIN — TETROFOSMIN 12.1 MCI.: 0.23 INJECTION, POWDER, LYOPHILIZED, FOR SOLUTION INTRAVENOUS at 10:20

## 2017-04-12 RX ADMIN — SODIUM CHLORIDE: 9 INJECTION, SOLUTION INTRAVENOUS at 00:30

## 2017-04-12 RX ADMIN — METOPROLOL TARTRATE 50 MG: 50 TABLET, FILM COATED ORAL at 14:36

## 2017-04-12 NOTE — PROGRESS NOTES
S-(situation): Patient registered to Observation. Patient arrived to room 256 via cart from ED    B-(background): chest pain    A-(assessment): patient reports chest pain/pressure to mid sternum radiating to the left arm, jaw, and upper back. Telemetry reading normal sinus rhythm, vital signs stable.     R-(recommendations): Orders and observation goals reviewed with patient    Nursing Observation criteria listed below was met:    Skin issues/needs documented:NA  Isolation needs addressed, if appropriate: NA  Fall Prevention: Education given and documented: NA  Education Assessment documented:Yes  Education Documented (Pre-existing chronic infection such as, MRSA/VRE need education on admission): Yes  New medication patient education completed and documented (Possible Side Effects of Common Medications handout): Yes  Home medications if not able to send immediately home with family stored here: NA  Reminder note placed in discharge instructions: NA  Patient has discharge needs (If yes, please explain): No

## 2017-04-12 NOTE — ED NOTES
ED Nursing criteria listed below was addressed during verbal handoff:     Abnormal vitals: Yes  Abnormal results: Yes  Med Reconciliation completed: Yes  Meds given in ED: Yes  Any Overdue Meds: No  Core Measures: Yes  Isolation: N/A  Special needs: N/A  Skin assessment: N/A    Observation Patient  Education provided: Yes

## 2017-04-12 NOTE — PROGRESS NOTES
S-(situation): Patient discharged to home via ambulatory with friend    B-(background): chest pain     A-(assessment): minimal chest discomfort, resolves with pain and anti-anxiety medications, moves independently in room, vitals stable    R-(recommendations): Discharge instructions reviewed with patient and friend. Listed belongings gathered and returned to patient.          Discharge Nursing Criteria:     Care Plan and Patient education resolved: Yes    New Medications- pt has been educated about purpose and side effects: Yes    Vaccines  Pneumonia Vaccine verified at discharge: No  Influenza status verified at discharge:  No      MISC  Prescriptions if needed, hard copies sent with patient  NA  Home and hospital aquired medications returned to patient: NA  Medication Bin checked and emptied on discharge Yes  Patient reports post-discharge pain management plan is effective: Yes

## 2017-04-12 NOTE — PROGRESS NOTES
S-(situation): Observation Note.    B-(background): Chest pain.    A-(assessment): Vital signs stable, NSR. Chest pain continues in mid-sternal area radiating to left neck, left shoulder, left arm, left upper back. NPO after 0400.     R-(recommendations): Stress echo scheduled for this morning.

## 2017-04-12 NOTE — DISCHARGE SUMMARY
[facility] Discharge Summary    Cecil Vasquez MRN# [id number]   Age: 46 year old YOB: 1970     Date of Admission:  4/11/2017  Date of Discharge::  4/12/2017  Admitting Physician:  Lui Mario MD  Discharge Physician:  Gladys Burger MD    Home clinic: [facility]          Admission Diagnoses:   Acute chest pain [R07.9]  Significant hypotension [I95.9]          Discharge Diagnosis:   Principal Problem:    Chest pain    Assessment: Patient presented with left-sided chest pain that was a pressure sensation in a patient with recent angioplasty of the LAD in February 2015 and decision was made to observe the patient for possible cardiac etiology for acute pain. During this observation stay patient continued to struggle with intermittent chest discomfort, however of note he had most improvement following p.r.n. Ativan and there was felt to be a large component of anxiety to his symptoms. Serial troponins were negative and Lexiscan stress test was unremarkable for cardiac etiology.    Plan:  Patient will be discharged home without changed to home regimen recently prescribed by cardiology following his angioplasty. He will follow-up with his primary care provider and discuss the role anxiety does play in his symptomatology as well as medications that may be beneficial in treating these conditions going forward. He will also keep better track of his chest pain symptoms if they do occur going forward and will follow up with his primary care provider within the week.    Active Problems:    Essential hypertension with goal blood pressure less than 140/90    Assessment:  Blood pressure remained stable, even with metoprolol being held    Plan:  Discharge without changed home regimen      Coronary artery disease involving native coronary artery of native heart status stenting of LAD lesion with drug eluting stent 2/21/2017    Assessment:  With acute chest pain as above  which does not appear to be cardiac in nature    Plan:  Discharge is unchanged home regimen and ongoing cardiology follow-up as previously scheduled      Chronic pain syndrome    Assessment:  Ongoing and chronic, with patient going to see a specialist down at the [place] regarding this issue    Plan:  Discharge without changed home regimen      Trigeminal neuralgia    Assessment:  Ongoing, causing much of patient's chronic pain symptoms    Plan:  Discharge without changed home regimen, follow-up with [facility] physician as above      Hyperlipidemia LDL goal <70    Assessment:  Patient recently started on statin therapy secondary to angioplasty    Plan:  Continue without change at time of discharge      Obesity    Assessment:  Patient has been attempting to lose weight and has had a 10 pound weight loss since his angioplasty    Plan:  Encouraged ongoing weight loss going forward.          Procedures:   Lexiscan treadmill stress test performed - no abnormal perfusion noted, normal exam          Medications Prior to Admission:     Prescriptions Prior to Admission   Medication Sig Dispense Refill Last Dose     lisinopril (PRINIVIL/ZESTRIL) 10 MG tablet Take 1 tablet (10 mg) by mouth daily 90 tablet 3 4/11/2017 at 0700     metoprolol (TOPROL-XL) 50 MG 24 hr tablet Take 1 tablet (50 mg) by mouth daily 90 tablet 3 4/11/2017 at 0700     prasugrel (EFFIENT) 10 MG TABS tablet Take 1 tablet (10 mg) by mouth daily 90 tablet 3 Past Week at Unknown time     rosuvastatin (CRESTOR) 40 MG tablet Take 1 tablet (40 mg) by mouth daily 90 tablet 3 4/11/2017 at 0700     nitroglycerin (NITROSTAT) 0.4 MG sublingual tablet For chest pain place 1 tablet under the tongue every 5 minutes for 3 doses. If symptoms persist 5 minutes after 1st dose call 911. 25 tablet 3 4/11/2017 at 1230     traMADol (ULTRAM) 50 MG tablet Take 1-2 tablets ( mg) by mouth every 6 hours as needed for moderate pain 120 tablet 0 4/11/2017 at 0700      gabapentin (NEURONTIN) 300 MG capsule Add 300mg to morning dose for 900mg in am. If not improved, then add 300 to midday dose for total of 900mg 30 capsule 0 4/11/2017 at 0700     gabapentin (NEURONTIN) 600 MG tablet Am & lunchtime. 240 tablet 1 4/11/2017 at 0700     Gabapentin (NEURONTIN PO) Take 1,200 mg by mouth At Bedtime   4/10/2017 at 2200     melatonin 3 MG tablet Take 1 tablet (3 mg) by mouth nightly as needed for sleep 30 tablet 1 4/10/2017 at 2200     MAPAP 325 MG tablet TAKE TWO TABLETS BY MOUTH EVERY 4 HOURS AS NEEDED FOR MILD PAIN 100 tablet 11      VENTOLIN  (90 BASE) MCG/ACT Inhaler INHALE 2 PUFFS INTO THE LUNGS EVERY 4 HOURS AS NEEDED FOR SHORTNESS OF BREATH OR DIFFICULTY BREATHING (Patient not taking: Reported on 3/23/2017) 18 g 0 Unknown at Unknown time     ipratropium - albuterol 0.5 mg/2.5 mg/3 mL (DUONEB) 0.5-2.5 (3) MG/3ML neb solution Take 1 vial (3 mLs) by nebulization every 4 hours as needed for shortness of breath / dyspnea (Patient not taking: Reported on 3/23/2017) 30 vial 0 Unknown at Unknown time             Discharge Medications:     Current Discharge Medication List      CONTINUE these medications which have CHANGED    Details   oxyCODONE (ROXICODONE) 5 MG IR tablet Take 1 tablet (5 mg) by mouth every 4 hours as needed for pain  Qty: 15 tablet, Refills: 0    Associated Diagnoses: Neuropathy (H); Acute chest pain      aspirin 81 MG EC tablet Take 1 tablet (81 mg) by mouth daily  Qty: 90 tablet, Refills: 0    Associated Diagnoses: Coronary artery disease involving native coronary artery of native heart with unstable angina pectoris (H)         CONTINUE these medications which have NOT CHANGED    Details   lisinopril (PRINIVIL/ZESTRIL) 10 MG tablet Take 1 tablet (10 mg) by mouth daily  Qty: 90 tablet, Refills: 3    Associated Diagnoses: Essential hypertension with goal blood pressure less than 140/90; ACS (acute coronary syndrome) (H)      metoprolol (TOPROL-XL) 50 MG 24 hr  tablet Take 1 tablet (50 mg) by mouth daily  Qty: 90 tablet, Refills: 3    Associated Diagnoses: Coronary artery disease involving native coronary artery of native heart with unstable angina pectoris (H)      prasugrel (EFFIENT) 10 MG TABS tablet Take 1 tablet (10 mg) by mouth daily  Qty: 90 tablet, Refills: 3    Associated Diagnoses: ACS (acute coronary syndrome) (H); Coronary artery disease involving native coronary artery of native heart with unstable angina pectoris (H)      rosuvastatin (CRESTOR) 40 MG tablet Take 1 tablet (40 mg) by mouth daily  Qty: 90 tablet, Refills: 3    Associated Diagnoses: ACS (acute coronary syndrome) (H)      nitroglycerin (NITROSTAT) 0.4 MG sublingual tablet For chest pain place 1 tablet under the tongue every 5 minutes for 3 doses. If symptoms persist 5 minutes after 1st dose call 911.  Qty: 25 tablet, Refills: 3    Associated Diagnoses: Coronary artery disease involving native coronary artery of native heart with unstable angina pectoris (H)      traMADol (ULTRAM) 50 MG tablet Take 1-2 tablets ( mg) by mouth every 6 hours as needed for moderate pain  Qty: 120 tablet, Refills: 0    Associated Diagnoses: Neuropathy (H)      !! gabapentin (NEURONTIN) 300 MG capsule Add 300mg to morning dose for 900mg in am. If not improved, then add 300 to midday dose for total of 900mg  Qty: 30 capsule, Refills: 0    Associated Diagnoses: Neuropathy (H)      !! gabapentin (NEURONTIN) 600 MG tablet Am & lunchtime.  Qty: 240 tablet, Refills: 1    Associated Diagnoses: Neuropathy (H)      !! Gabapentin (NEURONTIN PO) Take 1,200 mg by mouth At Bedtime      melatonin 3 MG tablet Take 1 tablet (3 mg) by mouth nightly as needed for sleep  Qty: 30 tablet, Refills: 1    Associated Diagnoses: Trigeminal neuralgia      MAPAP 325 MG tablet TAKE TWO TABLETS BY MOUTH EVERY 4 HOURS AS NEEDED FOR MILD PAIN  Qty: 100 tablet, Refills: 11    Associated Diagnoses: Trigeminal neuralgia      VENTOLIN  (90  BASE) MCG/ACT Inhaler INHALE 2 PUFFS INTO THE LUNGS EVERY 4 HOURS AS NEEDED FOR SHORTNESS OF BREATH OR DIFFICULTY BREATHING  Qty: 18 g, Refills: 0    Associated Diagnoses: Asthma with acute exacerbation, unspecified asthma severity      ipratropium - albuterol 0.5 mg/2.5 mg/3 mL (DUONEB) 0.5-2.5 (3) MG/3ML neb solution Take 1 vial (3 mLs) by nebulization every 4 hours as needed for shortness of breath / dyspnea  Qty: 30 vial, Refills: 0    Associated Diagnoses: Asthma with acute exacerbation, unspecified asthma severity       !! - Potential duplicate medications found. Please discuss with provider.                Consultations:   Consultation during this admission received from cardiology given patient's recent cardiac history and onset of pain on presentation          Brief History of Illness:   Patient is a 46-year-old gentleman with known history of CAD with a drug-eluting stent placed in February 2016 in the LAD region who presented to the emergency room with acute onset of left-sided pressure and concerns for repeat cardiac etiology. Initial EKG was unchanged and troponin was negative, but given his recent history decision was made to place patient under observation and perform stress testing in the morning if troponins remain stable.          Hospital Course:    During the observation stay patient did struggle with intermittent left-sided chest pressure which did not improve with nitroglycerin or other interventions, but did seem to have moderate improvement following p.r.n. Ativan with patient noted to have a significant anxiety component contributing to his symptoms. Troponins ×4 were negative and patient did undergo a Lexiscan stress test which showed normal perfusion and no concern at this time. Patient will be discharged home without change home regimen. He is very open to the thought that anxiety is playing a role in his symptoms and would like to discuss with his primary care provider options to help  assist in treatment of his anxiety going forward.         Physical Exam:   Vitals were reviewed  All vitals stable  Constitutional:   awake, alert, cooperative, no apparent distress, and appears stated age     Lungs:   No increased work of breathing, good air exchange, clear to auscultation bilaterally, no crackles or wheezing     Cardiovascular:   Normal apical impulse, regular rate and rhythm, normal S1 and S2, no S3 or S4, and no murmur noted     Abdomen:   Bowel sounds present, abdomen soft and non-tender     Musculoskeletal:   no lower extremity pitting edema present     Neurologic:   Awake, alert, oriented to name, place and time.       Skin:   normal skin color, texture, turgor              Discharge Instructions and Follow-Up:   Discharge diet: Cardiac low salt diet   Discharge activity: Activity as tolerated   Discharge follow-up: Follow up with primary care provider within 7-10 days           Discharge Disposition:   Discharged to home      Attestation:  I have reviewed today's vital signs, notes, medications, labs and imaging.    Less than 30 minutes was spent on this discharge.      Gladys Burger MD    Note: Chart documentation done in part with Dragon Voice Recognition software. Although reviewed after completion, some word and grammatical errors may remain.

## 2017-04-12 NOTE — H&P
[facility]    History and Physical  Hospitalist       Date of Admission:  4/11/2017  Date of Service (when I saw the patient): 04/11/17    Assessment & Plan   Cecil Vasquez is a 46 year old male who presents with chest pain involving the left side of his chest. Described as a weight associate with nausea starting this morning. It has not improved. History significant for abnormal stress testing in February 2017 leading to angiogram with a 99% proximal LAD lesion which was stented and the drug eluting stent place. Since that time has been started on beta blocker, ace inhibitor and is taking Effient daily. He has been pain-free until today. In the emergency room is EKG is unremarkable with no acute changes into troponin studies were normal. D dimer is normal, chest x-ray is normal. The pain is not improved nitroglycerin and has improved somewhat after IV narcotics. History is notable for chronic pain secondary to trigeminal neuralgia. This case was discussed with the on-call cardiologist was recommending observation stay with serial troponin studies and a nuclear stress test in the morning. This is been set up and will proceed with at as ordered. For now will continue his home medications including gabapentin, oral oxycodone and tramadol. Will monitor for worsening symptoms.    Principal Problem:    Chest pain    Assessment: presenting today with history of coronary artery disease with LAD lesion that was stented on every 21st 2017. Description of pain is somewhat worrisome described as a weight with associated nausea.    Plan: as per cardiology recommendations, will have stress nuclear test tomorrow morning. We'll proceed from there.  Active Problems:    Essential hypertension with goal blood pressure less than 140/90    Assessment: much improved with low blood pressure is in the ED currently on beta blocker and Ace inhibitor    Plan: will hold the beta blocker for now pending the  stress test. Follow blood pressure    Coronary artery disease involving native coronary artery of native heart status stenting of LAD lesion with drug eluting stent 2/21/2017    Assessment: also noted was a 60% RCA lesion which was not stented    Plan: proceed with stress testing as above    Chronic pain syndrome    Assessment: secondary to trigeminal neuralgia taking oxycodone, Neurontin and tramadol    Plan: continual meds    Trigeminal neuralgia    Assessment: as above    Plan: continual meds    Hyperlipidemia LDL goal <70    Assessment: recently started on Crestor, 40 milligrams daily    Plan: continue    Obesity    Assessment: has lost 10 pounds    Plan: continue weight loss  DVT Prophylaxis: Low Risk/Ambulatory with no VTE prophylaxis indicated  Code Status: Full Code    Disposition: Expected discharge in 1 days once stress test complete and plan determined.    Lui Mario MD    Primary Care Physician   Gonzalo Cole    Chief Complaint   46-year-old male with left-sided chest pain    History is obtained from the patient, electronic health record and emergency department physician    History of Present Illness   Cecil Vasquez is a 46 year old male who presents with left-sided chest pain since this morning. Described as a heavy weight on the left side of his chest without radiation associated with nausea, no diaphoresis. History significant for coronary artery disease within LAD lesion which was stented with a drug eluting stent on February 21, 2017. He has been pain-free since that procedure, until today. The pain today is different from what he experienced before. He has been working on his risk factors taking beta blocker and Ace inhibitor for blood pressure, Crestor for hyperlipidemia, daily aspirin along with Effient. He is trying to lose weight, having lost 10 pounds. He has chronic pain issues secondary to trigeminal neuralgia taking daily oxycodone, tramadol and they are working up on his  dose of Neurontin. He denies vomiting, abdominal pain. He denies any swelling in his legs, no signs of orthopnea, PND. He is a non-smoker.    Past Medical History    I have reviewed this patient's medical history and updated it with pertinent information if needed.   Past Medical History:   Diagnosis Date     ADHD (attention deficit hyperactivity disorder)      Bipolar 1 disorder (H)      Depression      Gastro-oesophageal reflux disease      Hyperlipidaemia      Hypertension      Impaired fasting glucose      Nephrolithiasis      Obesity      Reactive airway disease     uses albuterol inhaler when has cold     Renal calcification      Rosacea      Tobacco abuse      Trigeminal neuralgia      Uncomplicated asthma        Past Surgical History   I have reviewed this patient's surgical history and updated it with pertinent information if needed.  Past Surgical History:   Procedure Laterality Date     COMBINED CYSTOSCOPY, INSERT CATHETER URETER Left 3/25/2016    Procedure: COMBINED CYSTOSCOPY, INSERT CATHETER URETER;  Surgeon: Jorden Villafana MD;  Location:  OR     COMBINED CYSTOSCOPY, RETROGRADES, URETEROSCOPY, LASER HOLMIUM LITHOTRIPSY URETER(S), INSERT STENT Left 4/13/2016    Procedure: COMBINED CYSTOSCOPY, RETROGRADES, URETEROSCOPY, LASER HOLMIUM LITHOTRIPSY URETER(S), INSERT STENT;  Surgeon: Jorden Villafana MD;  Location:  OR     EXTRACORPOREAL SHOCK WAVE LITHOTRIPSY (ESWL) Left 3/25/2016    Procedure: EXTRACORPOREAL SHOCK WAVE LITHOTRIPSY (ESWL);  Surgeon: Jorden Villafana MD;  Location:  OR     EXTRACORPOREAL SHOCK WAVE LITHOTRIPSY, CYSTOSCOPY, INSERT STENT URETER(S), COMBINED Left 3/25/2016    Procedure: COMBINED EXTRACORPOREAL SHOCK WAVE LITHOTRIPSY, CYSTOSCOPY, INSERT STENT URETER(S);  Surgeon: Jorden Villafana MD;  Location:  OR     GENITOURINARY SURGERY  3 25 2016     Lithotripsy and stent placement     TONSILLECTOMY         Prior to Admission Medications   Prior to Admission  Medications   Prescriptions Last Dose Informant Patient Reported? Taking?   Gabapentin (NEURONTIN PO) 4/10/2017 at 2200  Yes Yes   Sig: Take 1,200 mg by mouth At Bedtime   MAPAP 325 MG tablet   No No   Sig: TAKE TWO TABLETS BY MOUTH EVERY 4 HOURS AS NEEDED FOR MILD PAIN   VENTOLIN  (90 BASE) MCG/ACT Inhaler Unknown at Unknown time  No No   Sig: INHALE 2 PUFFS INTO THE LUNGS EVERY 4 HOURS AS NEEDED FOR SHORTNESS OF BREATH OR DIFFICULTY BREATHING   Patient not taking: Reported on 3/23/2017   aspirin 81 MG EC tablet Past Week at Unknown time  No Yes   Sig: Take 1 tablet (81 mg) by mouth daily   gabapentin (NEURONTIN) 300 MG capsule 4/11/2017 at 0700  No Yes   Sig: Add 300mg to morning dose for 900mg in am. If not improved, then add 300 to midday dose for total of 900mg   gabapentin (NEURONTIN) 600 MG tablet 4/11/2017 at 0700  No Yes   Sig: Am & lunchtime.   ipratropium - albuterol 0.5 mg/2.5 mg/3 mL (DUONEB) 0.5-2.5 (3) MG/3ML neb solution Unknown at Unknown time  No No   Sig: Take 1 vial (3 mLs) by nebulization every 4 hours as needed for shortness of breath / dyspnea   Patient not taking: Reported on 3/23/2017   lisinopril (PRINIVIL/ZESTRIL) 10 MG tablet 4/11/2017 at 0700  No Yes   Sig: Take 1 tablet (10 mg) by mouth daily   melatonin 3 MG tablet 4/10/2017 at 2200  No Yes   Sig: Take 1 tablet (3 mg) by mouth nightly as needed for sleep   metoprolol (TOPROL-XL) 50 MG 24 hr tablet 4/11/2017 at 0700  No Yes   Sig: Take 1 tablet (50 mg) by mouth daily   nitroglycerin (NITROSTAT) 0.4 MG sublingual tablet 4/11/2017 at 1230  No Yes   Sig: For chest pain place 1 tablet under the tongue every 5 minutes for 3 doses. If symptoms persist 5 minutes after 1st dose call 911.   oxyCODONE (ROXICODONE) 5 MG IR tablet   No No   Sig: Take 1 tablet (5 mg) by mouth every 4 hours as needed for pain   prasugrel (EFFIENT) 10 MG TABS tablet Past Week at Unknown time  No Yes   Sig: Take 1 tablet (10 mg) by mouth daily   rosuvastatin  (CRESTOR) 40 MG tablet 4/11/2017 at 0700  No Yes   Sig: Take 1 tablet (40 mg) by mouth daily   traMADol (ULTRAM) 50 MG tablet 4/11/2017 at 0700  No Yes   Sig: Take 1-2 tablets ( mg) by mouth every 6 hours as needed for moderate pain      Facility-Administered Medications: None     Allergies   Allergies   Allergen Reactions     No Known Drug Allergies        Social History   I have reviewed this patient's social history and updated it with pertinent information if needed. Cecil Vasquez  reports that he has quit smoking. His smoking use included Cigarettes. He smoked 0.50 packs per day. He has never used smokeless tobacco. He reports that he does not drink alcohol or use illicit drugs.    Family History   I have reviewed this patient's family history and updated it with pertinent information if needed.   Family History   Problem Relation Age of Onset     Psychotic Disorder Mother      DIABETES Sister      Psychotic Disorder Sister        Review of Systems   The 10 point Review of Systems is negative other than noted in the HPI or here.     Physical Exam   Temp: 96.4  F (35.8  C) Temp src: Oral BP: 108/54 Pulse: 66 Heart Rate: 66 Resp: 16 SpO2: 96 % O2 Device: None (Room air)    Vital Signs with Ranges  Temp:  [96.4  F (35.8  C)-96.6  F (35.9  C)] 96.4  F (35.8  C)  Pulse:  [66-98] 66  Heart Rate:  [54-87] 66  Resp:  [6-35] 16  BP: ()/(48-93) 108/54  SpO2:  [86 %-99 %] 96 %  253 lbs 8.46 oz    EXAM:  Constitutional: alert, no distress, cooperative and over weight   Cardiovascular: PMI normal. No lifts, heaves, or thrills. RRR. No murmurs, clicks gallops or rub  Respiratory: Percussion normal. Good diaphragmatic excursion. Lungs clear  Psychiatric: mentation appears normal and affect normal/bright  Head: Normocephalic. No masses, lesions, tenderness or abnormalities  Neck: Neck supple. No adenopathy. Thyroid symmetric, normal size,  ENT: ENT exam normal, no neck nodes or sinus tenderness  Abdomen: Abdomen  soft, non-tender. BS normal. No masses, organomegaly  NEURO: Gait normal. Reflexes normal and symmetric. Sensation grossly WNL.  SKIN: some bruising on his arms. Area bruising wrapped with some tape on the right arm  LYMPH: Normal cervical lymph nodes  JOINT/EXTREMITIES: extremities normal- no gross deformities noted, normal muscle tone and no  ankle edema     Data   Data reviewed today:  I personally reviewed the EKG tracing showing Regular sinus rhythm, no changes, appears normal and the chest x-ray image(s) showing Appears normal with no lesions, or infiltrates.    Recent Labs  Lab 04/11/17  1627 04/11/17  1354   WBC  --  7.6   HGB  --  13.9   MCV  --  85   PLT  --  217   NA  --  140   POTASSIUM  --  4.3   CHLORIDE  --  103   CO2  --  27   BUN  --  20   CR  --  0.87   ANIONGAP  --  10   LÁZARO  --  9.2   GLC  --  110*   ALBUMIN  --  4.1   PROTTOTAL  --  7.9   BILITOTAL  --  0.5   ALKPHOS  --  77   ALT  --  34   AST  --  16   LIPASE  --  238   TROPI <0.015The 99th percentile for upper reference range is 0.045 ug/L.  Troponin values in the range of 0.045 - 0.120 ug/L may be associated with risks of adverse clinical events. <0.015The 99th percentile for upper reference range is 0.045 ug/L.  Troponin values in the range of 0.045 - 0.120 ug/L may be associated with risks of adverse clinical events.       Recent Results (from the past 24 hour(s))   XR Chest Port 1 View    Narrative    XR CHEST PORT 1 VW   4/11/2017 2:36 PM     HISTORY: chest pain    COMPARISON: Chest x-rays dated 2/19/2017.    FINDINGS:  The lungs are clear. No pleural effusions or pneumothorax.  Heart size and pulmonary vascularity are within normal limits. No  acute fracture.      Impression    IMPRESSION: No evidence of acute cardiopulmonary disease is seen.    NICKI SANTOS MD

## 2017-04-12 NOTE — ED PROVIDER NOTES
"  History     Chief Complaint   Patient presents with     Chest Pain     The history is provided by the patient and medical records.     This is a 46-year-old male with history of coronary disease status post LAD stent 2/21/17, hypertension, hyperlipidemia, asthma, GERD, chronic pain, and kidney stones presenting with chest pain. Patient initially noted left-sided chest pain at about 8 o'clock this morning. He describes the pain as a pressure that continues to worsen since its onset. He tried sublingual nitroglycerin about an hour prior to coming to the ED without any change. He feels that the pain is growling and he states that he has \"an impending sense of doom\". He notes that his chest pain is not the same as his previous anginal pain. He currently has some associated lightheadedness, nausea and sweating. He also feels short of breath for the last couple of hours. He has not taken any aspirin for the last 2 days because he was unable to find his supply, but he states he has been taking his other medications as prescribed. He denies any recent illnesses, cold or cough symptoms. No trauma or lifing or other chest muscle overuse.  No rash.    I have reviewed the Medications, Allergies, Past Medical and Surgical History, and Social History in the [facility] system.  Past Medical History:   Diagnosis Date     ADHD (attention deficit hyperactivity disorder)      Bipolar 1 disorder (H)      Depression      Gastro-oesophageal reflux disease      Hyperlipidaemia      Hypertension      Impaired fasting glucose      Nephrolithiasis      Obesity      Reactive airway disease     uses albuterol inhaler when has cold     Renal calcification      Rosacea      Tobacco abuse      Trigeminal neuralgia      Uncomplicated asthma      Past Surgical History:   Procedure Laterality Date     COMBINED CYSTOSCOPY, INSERT CATHETER URETER Left 3/25/2016    Procedure: COMBINED CYSTOSCOPY, INSERT CATHETER URETER;  Surgeon: Jorden Villafana MD;  " Location:  OR     COMBINED CYSTOSCOPY, RETROGRADES, URETEROSCOPY, LASER HOLMIUM LITHOTRIPSY URETER(S), INSERT STENT Left 4/13/2016    Procedure: COMBINED CYSTOSCOPY, RETROGRADES, URETEROSCOPY, LASER HOLMIUM LITHOTRIPSY URETER(S), INSERT STENT;  Surgeon: Jorden Villafana MD;  Location:  OR     EXTRACORPOREAL SHOCK WAVE LITHOTRIPSY (ESWL) Left 3/25/2016    Procedure: EXTRACORPOREAL SHOCK WAVE LITHOTRIPSY (ESWL);  Surgeon: Jorden Villafana MD;  Location: [facility] OR     EXTRACORPOREAL SHOCK WAVE LITHOTRIPSY, CYSTOSCOPY, INSERT STENT URETER(S), COMBINED Left 3/25/2016    Procedure: COMBINED EXTRACORPOREAL SHOCK WAVE LITHOTRIPSY, CYSTOSCOPY, INSERT STENT URETER(S);  Surgeon: Jorden Villafana MD;  Location:  OR     GENITOURINARY SURGERY  3 25 2016     Lithotripsy and stent placement     TONSILLECTOMY         No current facility-administered medications on file prior to encounter.   Current Outpatient Prescriptions on File Prior to Encounter:  lisinopril (PRINIVIL/ZESTRIL) 10 MG tablet Take 1 tablet (10 mg) by mouth daily   metoprolol (TOPROL-XL) 50 MG 24 hr tablet Take 1 tablet (50 mg) by mouth daily   prasugrel (EFFIENT) 10 MG TABS tablet Take 1 tablet (10 mg) by mouth daily   rosuvastatin (CRESTOR) 40 MG tablet Take 1 tablet (40 mg) by mouth daily   nitroglycerin (NITROSTAT) 0.4 MG sublingual tablet For chest pain place 1 tablet under the tongue every 5 minutes for 3 doses. If symptoms persist 5 minutes after 1st dose call 911.   traMADol (ULTRAM) 50 MG tablet Take 1-2 tablets ( mg) by mouth every 6 hours as needed for moderate pain   gabapentin (NEURONTIN) 300 MG capsule Add 300mg to morning dose for 900mg in am. If not improved, then add 300 to midday dose for total of 900mg   aspirin 81 MG EC tablet Take 1 tablet (81 mg) by mouth daily   gabapentin (NEURONTIN) 600 MG tablet Am & lunchtime.   Gabapentin (NEURONTIN PO) Take 1,200 mg by mouth At Bedtime   melatonin 3 MG tablet Take 1 tablet (3 mg)  "by mouth nightly as needed for sleep   MAPAP 325 MG tablet TAKE TWO TABLETS BY MOUTH EVERY 4 HOURS AS NEEDED FOR MILD PAIN   oxyCODONE (ROXICODONE) 5 MG IR tablet Take 1 tablet (5 mg) by mouth every 4 hours as needed for pain   VENTOLIN  (90 BASE) MCG/ACT Inhaler INHALE 2 PUFFS INTO THE LUNGS EVERY 4 HOURS AS NEEDED FOR SHORTNESS OF BREATH OR DIFFICULTY BREATHING (Patient not taking: Reported on 3/23/2017)   ipratropium - albuterol 0.5 mg/2.5 mg/3 mL (DUONEB) 0.5-2.5 (3) MG/3ML neb solution Take 1 vial (3 mLs) by nebulization every 4 hours as needed for shortness of breath / dyspnea (Patient not taking: Reported on 3/23/2017)     Allergies   Allergen Reactions     No Known Drug Allergies      Review of Systems   All other ROS reviewed and are negative or non-contributory except as stated in HPI.     Physical Exam   BP: 117/72  Pulse: 98  Heart Rate: 80  Temp: 96.6  F (35.9  C)  Resp: 16  Height: 175.3 cm (5' 9\")  Weight: 117.9 kg (260 lb)  SpO2: 97 %  Physical Exam   Constitutional: He appears well-developed and well-nourished.   Anxious, diaphoretic, pale man sitting in the bed   HENT:   Head: Normocephalic.   Right Ear: External ear normal.   Left Ear: External ear normal.   Nose: Nose normal.   Tacky mucous membranes   Eyes: Conjunctivae and EOM are normal. Pupils are equal, round, and reactive to light. No scleral icterus.   Neck: Normal range of motion. Neck supple.   Cardiovascular: Normal rate, regular rhythm, normal heart sounds and intact distal pulses.    Pulmonary/Chest: Effort normal and breath sounds normal.   Abdominal: Soft. There is no tenderness.   Musculoskeletal: Normal range of motion.   Neurological: He is alert. He exhibits normal muscle tone.   Skin: Skin is warm. He is diaphoretic.   Psychiatric: His behavior is normal.   Anxious   Vitals reviewed.      ED Course (with Medical Decision Making)    Pt seen and examined by me.  RN and EPIC notes reviewed.      Patient with recent stent " placement for 99% LAD lesion presenting with chest pain. He does state the pain is not the same as previous anginal pain, but this could be his anginal variant. Possible other cardiac cause, dissection, PE, pulmonary cause, GI, other. Plan EKG, labs, IV, nitroglycerin, pain medication. Patient noted to have episode of hypotension and IV fluids started. Zofran for nausea. Ativan for anxiety.     Patient's labs are all basically normal. As includes lipase, troponin and d-dimer. Chest x-ray clear. EKG normal. This was repeated a 2nd time when he became more hypotensive and diaphoretic and it was also normal.    Patient continued to have chest pain that was relieved with narcotic medications. I spoke with cardiology at [facility] regarding the patient. With all of the normal findings, he recommended a 2 hour troponin. If it was increasing, he would take the patient for transfer. If not, he recommended observing the patient with a nuclear stress test tomorrow.     2nd troponin is normal.     I spoke with the patient regarding the findings and the recommendation. I'm going to admit him observation status at this point. I do have some concerns with continuing to use narcotic pain medications in this patient however, he does have significant cardiac history and he is appropriately being managed with admission and nuclear stress test. His blood pressures have remained slightly decreased and he received 2 L of IV fluids and continues to have saline running. He is stable at this point.        Procedures          Results for orders placed or performed during the hospital encounter of 04/11/17   XR Chest Port 1 View    Narrative    XR CHEST PORT 1 VW   4/11/2017 2:36 PM     HISTORY: chest pain    COMPARISON: Chest x-rays dated 2/19/2017.    FINDINGS:  The lungs are clear. No pleural effusions or pneumothorax.  Heart size and pulmonary vascularity are within normal limits. No  acute fracture.      Impression     IMPRESSION: No evidence of acute cardiopulmonary disease is seen.    NICKI SANTOS MD   CBC with platelets differential   Result Value Ref Range    WBC 7.6 4.0 - 11.0 10e9/L    RBC Count 4.86 4.4 - 5.9 10e12/L    Hemoglobin 13.9 13.3 - 17.7 g/dL    Hematocrit 41.3 40.0 - 53.0 %    MCV 85 78 - 100 fl    MCH 28.6 26.5 - 33.0 pg    MCHC 33.7 31.5 - 36.5 g/dL    RDW 13.0 10.0 - 15.0 %    Platelet Count 217 150 - 450 10e9/L    Diff Method Automated Method     % Neutrophils 64.9 %    % Lymphocytes 24.9 %    % Monocytes 7.7 %    % Eosinophils 1.5 %    % Basophils 0.1 %    % Immature Granulocytes 0.9 %    Absolute Neutrophil 4.9 1.6 - 8.3 10e9/L    Absolute Lymphocytes 1.9 0.8 - 5.3 10e9/L    Absolute Monocytes 0.6 0.0 - 1.3 10e9/L    Absolute Eosinophils 0.1 0.0 - 0.7 10e9/L    Absolute Basophils 0.0 0.0 - 0.2 10e9/L    Abs Immature Granulocytes 0.1 0 - 0.4 10e9/L   Troponin I   Result Value Ref Range    Troponin I ES  0.000 - 0.045 ug/L     <0.015  The 99th percentile for upper reference range is 0.045 ug/L.  Troponin values in   the range of 0.045 - 0.120 ug/L may be associated with risks of adverse   clinical events.     Comprehensive metabolic panel   Result Value Ref Range    Sodium 140 133 - 144 mmol/L    Potassium 4.3 3.4 - 5.3 mmol/L    Chloride 103 94 - 109 mmol/L    Carbon Dioxide 27 20 - 32 mmol/L    Anion Gap 10 3 - 14 mmol/L    Glucose 110 (H) 70 - 99 mg/dL    Urea Nitrogen 20 7 - 30 mg/dL    Creatinine 0.87 0.66 - 1.25 mg/dL    GFR Estimate >90  Non  GFR Calc   >60 mL/min/1.7m2    GFR Estimate If Black >90   GFR Calc   >60 mL/min/1.7m2    Calcium 9.2 8.5 - 10.1 mg/dL    Bilirubin Total 0.5 0.2 - 1.3 mg/dL    Albumin 4.1 3.4 - 5.0 g/dL    Protein Total 7.9 6.8 - 8.8 g/dL    Alkaline Phosphatase 77 40 - 150 U/L    ALT 34 0 - 70 U/L    AST 16 0 - 45 U/L   Lipase   Result Value Ref Range    Lipase 238 73 - 393 U/L   D dimer quantitative   Result Value Ref Range    D Dimer 0.3 0.0 -  0.50 ug/ml FEU   Troponin I   Result Value Ref Range    Troponin I ES  0.000 - 0.045 ug/L     <0.015  The 99th percentile for upper reference range is 0.045 ug/L.  Troponin values in   the range of 0.045 - 0.120 ug/L may be associated with risks of adverse   clinical events.     Troponin I - Now then in 6 hours x 2     Result Value Ref Range    Troponin I ES  0.000 - 0.045 ug/L     <0.015  The 99th percentile for upper reference range is 0.045 ug/L.  Troponin values in   the range of 0.045 - 0.120 ug/L may be associated with risks of adverse   clinical events.          Assessments & Plan   I have reviewed the findings, diagnosis, plan with the patient.    Current Discharge Medication List      START taking these medications    Details   MAPAP 325 MG tablet TAKE TWO TABLETS BY MOUTH EVERY 4 HOURS AS NEEDED FOR MILD PAIN  Qty: 100 tablet, Refills: 11    Associated Diagnoses: Trigeminal neuralgia             Final diagnoses:   Acute chest pain   Significant hypotension     Disposition: Patient admitted observation status in stable condition.    Note: Chart documentation done in part with Dragon Voice Recognition software. Although reviewed after completion, some word and grammatical errors may remain.     4/11/2017   [address] MEDICAL SURGICAL     Amy Casper MD  04/12/17 0044

## 2017-04-13 ENCOUNTER — TELEPHONE (OUTPATIENT)
Dept: INTERNAL MEDICINE | Facility: CLINIC | Age: 47
End: 2017-04-13

## 2017-04-13 ENCOUNTER — TELEPHONE (OUTPATIENT)
Dept: FAMILY MEDICINE | Facility: CLINIC | Age: 47
End: 2017-04-13

## 2017-04-13 NOTE — TELEPHONE ENCOUNTER
Inpatient Visit Date: 4/12/17, [facility]  Diagnosis / Reason for Visit: Acute chest pain

## 2017-04-13 NOTE — TELEPHONE ENCOUNTER
"Hospital/TCU/ED for chronic condition Discharge Protocol    \"Hi, my name is Val Garciahelm, a registered nurse, and I am calling from [facility].  I am calling to follow up and see how things are going for you after your recent emergency visit/hospital/TCU stay.\"    Tell me how you are doing now that you are home?\" I am feeling OK, but NOT great. Some intermittent pains, still.  MORPHINE helped a lot in the hospital.       Discharge Instructions    \"Let's review your discharge instructions.  What is/are the follow-up recommendations?  Pt. Response:  Make appt to see my provider    \"Has an appointment with your primary care provider been scheduled?\"   Yes. (confirm)  4/20/17    \"When you see the provider, I would recommend that you bring your medications with you.\"    Medications    \"Tell me what changed about your medicines when you discharged?\"    Changes to chronic meds?    \"NO changes to my medications and NO new meds sent home. \"    \"What questions do you have about your medications?\"    \"When I had My first heart attack I had to stop the rehab stuff guilherme of work contraints, etc.  I was wondering if Dr. Cole would let me do that now after having these symptoms again?  \"   RN suggests he discuss this with him at his appt on 4/20/17.      New diagnoses of heart failure, COPD, diabetes, or MI?    No              Medication reconciliation completed? \" NO changes to my meds. \"   Was MTM referral placed (*Make sure to put transitions as reason for referral)?   No    Call Summary    \"What questions or concerns do you have about your recent visit and your follow-up care?\"     none    \"If you have questions or things don't continue to improve, we encourage you contact us through the main clinic number (give number).  Even if the clinic is not open, triage nurses are available 24/7 to help you.     We would like you to know that our clinic has extended hours (provide information).  We also have urgent care " "(provide details on closest location and hours/contact info)\"      \"Thank you for your time and take care!\"             "

## 2017-04-13 NOTE — TELEPHONE ENCOUNTER
[facility] call been made to the patient early this afternoon. Will close this encounter has DO NOT want to repeat call........................................RUSS Li

## 2017-04-15 DIAGNOSIS — I25.110 CORONARY ARTERY DISEASE INVOLVING NATIVE CORONARY ARTERY OF NATIVE HEART WITH UNSTABLE ANGINA PECTORIS (H): ICD-10-CM

## 2017-04-15 DIAGNOSIS — I10 ESSENTIAL HYPERTENSION WITH GOAL BLOOD PRESSURE LESS THAN 140/90: ICD-10-CM

## 2017-04-17 DIAGNOSIS — G62.9 NEUROPATHY: ICD-10-CM

## 2017-04-17 RX ORDER — METOPROLOL SUCCINATE 25 MG/1
TABLET, EXTENDED RELEASE ORAL
Qty: 30 TABLET | Refills: 11 | Status: SHIPPED | OUTPATIENT
Start: 2017-04-17 | End: 2017-07-20

## 2017-04-17 NOTE — TELEPHONE ENCOUNTER
Gabapentin       Last Written Prescription Date:  2/28/2017  Last Fill Quantity: 240,   # refills: 1  Last Office Visit with [facility], [facility] or [facility] prescribing provider: 3/23/2017  Future Office visit:    Next 5 appointments (look out 90 days)     Apr 20, 2017 12:00 PM CDT   Office Visit with Gonzalo Cole MD   [facility] ([facility])    [address] [place] [id number]   [phone]            Balta 15, 2017 10:00 AM CDT   Return Visit with Rose Durham PA-C   [facility] ([facility])    [address] [place] [id number]   [phone]                   Routing refill request to provider for review/approval because:  Drug not on the [facility], [facility] or [facility] refill protocol or controlled substance

## 2017-04-17 NOTE — TELEPHONE ENCOUNTER
Metoprolol (TOLROL-XL) 50 MG 24 hr tablet      Last Written Prescription Date: 03/23/2017  Last Fill Quantity: 90, # refills: 3    Last Office Visit with [facility], [facility] or [facility] prescribing provider:  03/23/2017   Future Office Visit:    Next 5 appointments (look out 90 days)     Apr 20, 2017 12:00 PM CDT   Office Visit with Gonzalo Cole MD   [facility]    [address] [place] [id number]   [phone]            Balta 15, 2017 10:00 AM CDT   Return Visit with Rose Durham PA-C   [facility] ([facility])    [address] [place] [phone]   [phone]                    BP Readings from Last 3 Encounters:   04/12/17 103/69   03/23/17 122/76   03/23/17 118/82     Luiza Austin [facility]

## 2017-04-17 NOTE — TELEPHONE ENCOUNTER
Prescription approved per [facility] Refill Protocol............RUSS Li

## 2017-04-18 RX ORDER — GABAPENTIN 600 MG/1
TABLET ORAL
Qty: 240 TABLET | Refills: 1 | Status: SHIPPED | OUTPATIENT
Start: 2017-04-18 | End: 2017-04-20

## 2017-04-20 ENCOUNTER — OFFICE VISIT (OUTPATIENT)
Dept: FAMILY MEDICINE | Facility: CLINIC | Age: 47
End: 2017-04-20
Payer: COMMERCIAL

## 2017-04-20 VITALS
TEMPERATURE: 97.6 F | SYSTOLIC BLOOD PRESSURE: 144 MMHG | OXYGEN SATURATION: 99 % | RESPIRATION RATE: 20 BRPM | WEIGHT: 247.3 LBS | HEART RATE: 104 BPM | BODY MASS INDEX: 36.52 KG/M2 | DIASTOLIC BLOOD PRESSURE: 88 MMHG

## 2017-04-20 DIAGNOSIS — G50.0 TRIGEMINAL NEURALGIA: ICD-10-CM

## 2017-04-20 DIAGNOSIS — G89.4 CHRONIC PAIN SYNDROME: ICD-10-CM

## 2017-04-20 DIAGNOSIS — I25.10 CORONARY ARTERY DISEASE INVOLVING NATIVE CORONARY ARTERY OF NATIVE HEART WITHOUT ANGINA PECTORIS: Primary | ICD-10-CM

## 2017-04-20 DIAGNOSIS — G62.9 NEUROPATHY: ICD-10-CM

## 2017-04-20 PROCEDURE — 99214 OFFICE O/P EST MOD 30 MIN: CPT | Performed by: FAMILY MEDICINE

## 2017-04-20 RX ORDER — OXYCODONE HYDROCHLORIDE 5 MG/1
5 TABLET ORAL EVERY 4 HOURS PRN
Qty: 60 TABLET | Refills: 0 | Status: SHIPPED | OUTPATIENT
Start: 2017-04-20 | End: 2017-05-18

## 2017-04-20 RX ORDER — TRAMADOL HYDROCHLORIDE 50 MG/1
50-100 TABLET ORAL EVERY 6 HOURS PRN
Qty: 120 TABLET | Refills: 0 | Status: SHIPPED | OUTPATIENT
Start: 2017-04-20 | End: 2017-05-18

## 2017-04-20 RX ORDER — GABAPENTIN 600 MG/1
TABLET ORAL
Qty: 240 TABLET | Refills: 1 | COMMUNITY
Start: 2017-04-20 | End: 2017-04-20

## 2017-04-20 RX ORDER — LANOLIN ALCOHOL/MO/W.PET/CERES
3 CREAM (GRAM) TOPICAL
Qty: 30 TABLET | Refills: 1 | Status: SHIPPED | OUTPATIENT
Start: 2017-04-20 | End: 2017-06-13

## 2017-04-20 RX ORDER — GABAPENTIN 600 MG/1
TABLET ORAL
Qty: 240 TABLET | Refills: 1 | Status: SHIPPED | OUTPATIENT
Start: 2017-04-20 | End: 2017-06-14

## 2017-04-20 ASSESSMENT — PAIN SCALES - GENERAL: PAINLEVEL: SEVERE PAIN (7)

## 2017-04-20 NOTE — NURSING NOTE
"Chief Complaint   Patient presents with     Recheck Medication     Hospital F/U       Initial /88 (BP Location: Left arm, Patient Position: Chair, Cuff Size: Adult Regular)  Pulse 104  Temp 97.6  F (36.4  C) (Temporal)  Resp 20  Wt 247 lb 4.8 oz (112.2 kg)  SpO2 99%  BMI 36.52 kg/m2 Estimated body mass index is 36.52 kg/(m^2) as calculated from the following:    Height as of 4/11/17: 5' 9\" (1.753 m).    Weight as of this encounter: 247 lb 4.8 oz (112.2 kg).  Medication Reconciliation: complete   Luiza Austin CMA     "

## 2017-04-20 NOTE — PROGRESS NOTES
SUBJECTIVE:                                                    Cecil Vasquez is a 46 year old male who presents to clinic today for the following health issues:    Chief Complaint   Patient presents with     Recheck Medication     Hospital F/U            Hospital Follow-up Visit:    Hospital/Nursing Home/IP Rehab Facility: [facility]  Date of Admission: 04/11/2017  Date of Discharge: 04/12/2017  Reason(s) for Admissi/on: Chest pain            Problems taking medications regularly:  None       Medication changes since discharge: None       Problems adhering to non-medication therapy:  None    Summary of hospitalization:  [facility] discharge summary reviewed  Diagnostic Tests/Treatments reviewed.  Follow up needed: none  Other Healthcare Providers Involved in Patient s Care:         None  Update since discharge: improved.     Post Discharge Medication Reconciliation: discharge medications reconciled, continue medications without change.  Plan of care communicated with patient     Coding guidelines for this visit:  Type of Medical   Decision Making Face-to-Face Visit       within 7 Days of discharge Face-to-Face Visit        within 14 days of discharge   Moderate Complexity [id number]   High Complexity [id number]          Patient returns to clinic. This is a hospital follow-up. He was admitted with a chest pain rule out. He has known heart disease. His rule out was unremarkable. At discharge they were suspicious this had more anxiety component. He was given Ativan with mass effect.      ROS:  Constitutional, HEENT, cardiovascular, pulmonary, gi and gu systems are negative, except as otherwise noted.    OBJECTIVE:                                                    /88 (BP Location: Left arm, Patient Position: Chair, Cuff Size: Adult Regular)  Pulse 104  Temp 97.6  F (36.4  C) (Temporal)  Resp 20  Wt 247 lb 4.8 oz (112.2 kg)  SpO2 99%  BMI 36.52 kg/m2  Body mass index is 36.52  kg/(m^2).    GENERAL: healthy, alert and no distress  NECK: no adenopathy, no asymmetry, masses, or scars and thyroid normal to palpation  RESP: lungs clear to auscultation - no rales, rhonchi or wheezes  CV: regular rate and rhythm, normal S1 S2, no S3 or S4, no murmur, click or rub, no peripheral edema and peripheral pulses strong  ABDOMEN: soft, nontender, no hepatosplenomegaly, no masses and bowel sounds normal  MS: no gross musculoskeletal defects noted, no edema    Diagnostic Test Results:  none      ASSESSMENT/PLAN:                                                        ICD-10-CM    1. Coronary artery disease involving native coronary artery of native heart without angina pectoris I25.10    2. Neuropathy (H) G62.9 oxyCODONE (ROXICODONE) 5 MG IR tablet     traMADol (ULTRAM) 50 MG tablet     gabapentin (NEURONTIN) 600 MG tablet     DISCONTINUED: gabapentin (NEURONTIN) 600 MG tablet   3. Trigeminal neuralgia G50.0 melatonin 3 MG tablet   4. Chronic pain syndrome G89.4        46-year-old with ongoing chest pain issues thought to be possibly related to anxiety. It is unknown heart disease. I asked him to closely watch his symptoms. He may continue to use nitroglycerin. If chest pain returns certainly may use me or the ER for evaluation. Over time, we should get used to this chest pain as noncardiac, if that's the case. This may also be his anxiety. I don't want to initiate benzodiazepines for him. He agrees. He will continue working with his counselor to manage stress. Might also consider BuSpar or an SSRI. Currently he doesn't feel as necessary. His chronic pain is under control. He will continue to use 2-3 oxycodone daily as breakthrough on top of his tramadol. I will also increase his gabapentin to approximately 800 mg 3 times a day to help with this facial pain. Over the next few months are goal is to get his oxycodone use back down to one tab daily. He agrees.    Gonzalo Cole MD  [facility]  [place]

## 2017-04-20 NOTE — MR AVS SNAPSHOT
After Visit Summary   4/20/2017    Cecil Vasquez    MRN: [id number]           Patient Information     Date Of Birth          1970        Visit Information        Provider Department      4/20/2017 12:00 PM Gonzalo Cole MD [facility]        Today's Diagnoses     Coronary artery disease involving native coronary artery of native heart without angina pectoris    -  1    Neuropathy (H)        Trigeminal neuralgia        Chronic pain syndrome           Follow-ups after your visit        Your next 10 appointments already scheduled     Apr 22, 2017  8:00 AM CDT   LAB with [facility] LAB [facility]   [facility]    [address] [place] [id number] [phone]           Patient must bring picture ID.  Patient should be prepared to give a urine specimen  Please do not eat 10-12 hours before your appointment if you are coming in fasting for labs on lipids, cholesterol, or glucose (sugar).  Pregnant women should follow their Care Team instructions. Water with medications is okay. Do not drink coffee or other fluids.   If you have concerns about taking  your medications, please ask at office or if scheduling via [employer], send a message by clicking on Secure Messaging, Message Your Care Team.            Balta 15, 2017 10:00 AM CDT   Return Visit with Rose Durham PA-C   [facility]    [address] [place] [id number] [phone]              Who to contact     If you have questions or need follow up information about today's clinic visit or your schedule please contact [facility] directly at [phone].  Normal or non-critical lab and imaging results will be communicated to you by MyChart, letter or phone within 4 business days after the clinic has received the results. If you do not hear from us within 7 days, please contact the clinic through [employer] or  phone. If you have a critical or abnormal lab result, we will notify you by phone as soon as possible.  Submit refill requests through [employer] or call your pharmacy and they will forward the refill request to us. Please allow 3 business days for your refill to be completed.          Additional Information About Your Visit        Care EveryWhere ID     This is your Care EveryWhere ID. This could be used by other organizations to access your [facility] medical records  [id number]        Your Vitals Were     Pulse Temperature Respirations Pulse Oximetry BMI (Body Mass Index)       104 97.6  F (36.4  C) (Temporal) 20 99% 36.52 kg/m2        Blood Pressure from Last 3 Encounters:   04/20/17 144/88   04/12/17 103/69   03/23/17 122/76    Weight from Last 3 Encounters:   04/20/17 247 lb 4.8 oz (112.2 kg)   04/11/17 253 lb 8.5 oz (115 kg)   03/23/17 247 lb 14.4 oz (112.4 kg)              Today, you had the following     No orders found for display         Today's Medication Changes          These changes are accurate as of: 4/20/17  3:06 PM.  If you have any questions, ask your nurse or doctor.               Start taking these medicines.        Dose/Directions    gabapentin 600 MG tablet   Commonly known as:  NEURONTIN   Used for:  Neuropathy (H)   Replaces:  NEURONTIN PO   Started by:  Gonzalo Cole MD        TAKE TWO TABLETS BY MOUTH THREE TIMES A DAY, increase by 1 tab every 2-3 days, until max of 5400 mg daily   Quantity:  240 tablet   Refills:  1         These medicines have changed or have updated prescriptions.        Dose/Directions    * oxyCODONE 5 MG IR tablet   Commonly known as:  ROXICODONE   This may have changed:  Another medication with the same name was added. Make sure you understand how and when to take each.   Used for:  Neuropathy (H), Acute chest pain        Dose:  5 mg   Take 1 tablet (5 mg) by mouth every 4 hours as needed for pain   Quantity:  15 tablet   Refills:  0       * oxyCODONE 5 MG IR  tablet   Commonly known as:  ROXICODONE   This may have changed:  You were already taking a medication with the same name, and this prescription was added. Make sure you understand how and when to take each.   Used for:  Neuropathy (H)   Changed by:  Gonzalo Cole MD        Dose:  5 mg   Take 1 tablet (5 mg) by mouth every 4 hours as needed for pain   Quantity:  60 tablet   Refills:  0       * Notice:  This list has 2 medication(s) that are the same as other medications prescribed for you. Read the directions carefully, and ask your doctor or other care provider to review them with you.      Stop taking these medicines if you haven't already. Please contact your care team if you have questions.     gabapentin 300 MG capsule   Commonly known as:  NEURONTIN   Stopped by:  Gonazlo Cole MD           NEURONTIN PO   Replaced by:  gabapentin 600 MG tablet   Stopped by:  Gonzalo Cole MD                Where to get your medicines      These medications were sent to [facility], [address]   [address] [place] [address] [facility] [address]     Phone:  [phone]     gabapentin 600 MG tablet    melatonin 3 MG tablet         Some of these will need a paper prescription and others can be bought over the counter.  Ask your nurse if you have questions.     Bring a paper prescription for each of these medications     oxyCODONE 5 MG IR tablet    traMADol 50 MG tablet                Primary Care Provider Office Phone # Fax #    Gonzalo Cole [phone]       [address]   [facility] [address]        Thank you!     Thank you for choosing [facility]  for your care. Our goal is always to provide you with excellent care. Hearing back from our patients is one way we can continue to improve our services. Please take a few minutes to complete the written survey that you may receive in the mail after your visit with  us. Thank you!             Your Updated Medication List - Protect others around you: Learn how to safely use, store and throw away your medicines at www.disposemymeds.org.          This list is accurate as of: 4/20/17  3:06 PM.  Always use your most recent med list.                   Brand Name Dispense Instructions for use    aspirin 81 MG EC tablet     90 tablet    Take 1 tablet (81 mg) by mouth daily       gabapentin 600 MG tablet    NEURONTIN    240 tablet    TAKE TWO TABLETS BY MOUTH THREE TIMES A DAY, increase by 1 tab every 2-3 days, until max of 5400 mg daily       ipratropium - albuterol 0.5 mg/2.5 mg/3 mL 0.5-2.5 (3) MG/3ML neb solution    DUONEB    30 vial    Take 1 vial (3 mLs) by nebulization every 4 hours as needed for shortness of breath / dyspnea       lisinopril 10 MG tablet    PRINIVIL/ZESTRIL    90 tablet    Take 1 tablet (10 mg) by mouth daily       MAPAP 325 MG tablet   Generic drug:  acetaminophen     100 tablet    TAKE TWO TABLETS BY MOUTH EVERY 4 HOURS AS NEEDED FOR MILD PAIN       melatonin 3 MG tablet     30 tablet    Take 1 tablet (3 mg) by mouth nightly as needed for sleep       * metoprolol 50 MG 24 hr tablet    TOPROL-XL    90 tablet    Take 1 tablet (50 mg) by mouth daily       * metoprolol 25 MG 24 hr tablet    TOPROL-XL    30 tablet    TAKE ONE TABLET BY MOUTH EVERY DAY       nitroglycerin 0.4 MG sublingual tablet    NITROSTAT    25 tablet    For chest pain place 1 tablet under the tongue every 5 minutes for 3 doses. If symptoms persist 5 minutes after 1st dose call 911.       * oxyCODONE 5 MG IR tablet    ROXICODONE    15 tablet    Take 1 tablet (5 mg) by mouth every 4 hours as needed for pain       * oxyCODONE 5 MG IR tablet    ROXICODONE    60 tablet    Take 1 tablet (5 mg) by mouth every 4 hours as needed for pain       prasugrel 10 MG Tabs tablet    EFFIENT    90 tablet    Take 1 tablet (10 mg) by mouth daily       rosuvastatin 40 MG tablet    CRESTOR    90 tablet    Take 1  tablet (40 mg) by mouth daily       traMADol 50 MG tablet    ULTRAM    120 tablet    Take 1-2 tablets ( mg) by mouth every 6 hours as needed for moderate pain       VENTOLIN  (90 BASE) MCG/ACT Inhaler   Generic drug:  albuterol     18 g    INHALE 2 PUFFS INTO THE LUNGS EVERY 4 HOURS AS NEEDED FOR SHORTNESS OF BREATH OR DIFFICULTY BREATHING       * Notice:  This list has 4 medication(s) that are the same as other medications prescribed for you. Read the directions carefully, and ask your doctor or other care provider to review them with you.

## 2017-04-28 DIAGNOSIS — G50.0 TRIGEMINAL NEURALGIA: ICD-10-CM

## 2017-04-28 RX ORDER — ACETAMINOPHEN 325 MG/1
TABLET ORAL
Qty: 100 TABLET | Refills: 11 | Status: SHIPPED | OUTPATIENT
Start: 2017-04-28 | End: 2017-05-18

## 2017-04-28 NOTE — TELEPHONE ENCOUNTER
MAPAP 325 MG tablet      Last Written Prescription Date:  4/11/17  Last Fill Quantity: 100,   # refills: 11  Last Office Visit with [facility], [facility] or [facility] prescribing provider: 4/20/17  Future Office visit:    Next 5 appointments (look out 90 days)     Balta 15, 2017 10:00 AM CDT   Return Visit with Rose Durham PA-C   [facility]    [address] [place] [id number] [phone]                   Routing refill request to provider for review/approval because:  Drug not on the [facility], [facility] or [facility] refill protocol or controlled substance

## 2017-05-04 ENCOUNTER — TELEPHONE (OUTPATIENT)
Dept: FAMILY MEDICINE | Facility: CLINIC | Age: 47
End: 2017-05-04

## 2017-05-04 NOTE — TELEPHONE ENCOUNTER
Patient called back. Scheduled him for an office visit on 5/18. Patient requested an office visit to discuss medication check as well as blood pressure check.    Thanks  Elgin Pace

## 2017-05-04 NOTE — TELEPHONE ENCOUNTER
Panel Management Review      Patient has the following on his problem list:     Hypertension   Last three blood pressure readings:  BP Readings from Last 3 Encounters:   04/20/17 144/88   04/12/17 103/69   03/23/17 122/76     Blood pressure: FAILED    HTN Guidelines:  Age 18-59 BP range:  Less than 140/90  Age 60-85 with Diabetes:  Less than 140/90  Age 60-85 without Diabetes:  less than 150/90      Composite cancer screening  Chart review shows that this patient is due/due soon for the following None  Summary:    Patient is due/failing the following:   BP CHECK    Action needed:   Patient needs office visit for float appointment for a blood pressure check.    Type of outreach:    Phone, left message for patient to call back.     Questions for provider review:    None                                                                                                                                    Luiza Austin CMA      Chart routed to Care Team .

## 2017-05-18 ENCOUNTER — OFFICE VISIT (OUTPATIENT)
Dept: FAMILY MEDICINE | Facility: CLINIC | Age: 47
End: 2017-05-18
Payer: COMMERCIAL

## 2017-05-18 VITALS
TEMPERATURE: 98.4 F | BODY MASS INDEX: 36.87 KG/M2 | RESPIRATION RATE: 20 BRPM | DIASTOLIC BLOOD PRESSURE: 89 MMHG | WEIGHT: 249.7 LBS | SYSTOLIC BLOOD PRESSURE: 130 MMHG | OXYGEN SATURATION: 98 % | HEART RATE: 104 BPM

## 2017-05-18 DIAGNOSIS — G62.9 NEUROPATHY: ICD-10-CM

## 2017-05-18 DIAGNOSIS — E78.5 DYSLIPIDEMIA, GOAL LDL BELOW 70: ICD-10-CM

## 2017-05-18 DIAGNOSIS — I10 ESSENTIAL HYPERTENSION WITH GOAL BLOOD PRESSURE LESS THAN 140/90: Primary | ICD-10-CM

## 2017-05-18 DIAGNOSIS — I10 ESSENTIAL HYPERTENSION WITH GOAL BLOOD PRESSURE LESS THAN 140/90: ICD-10-CM

## 2017-05-18 LAB
ALT SERPL W P-5'-P-CCNC: 37 U/L (ref 0–70)
ANION GAP SERPL CALCULATED.3IONS-SCNC: 11 MMOL/L (ref 3–14)
BUN SERPL-MCNC: 23 MG/DL (ref 7–30)
CALCIUM SERPL-MCNC: 9.5 MG/DL (ref 8.5–10.1)
CHLORIDE SERPL-SCNC: 105 MMOL/L (ref 94–109)
CHOLEST SERPL-MCNC: 181 MG/DL
CO2 SERPL-SCNC: 24 MMOL/L (ref 20–32)
CREAT SERPL-MCNC: 0.77 MG/DL (ref 0.66–1.25)
GFR SERPL CREATININE-BSD FRML MDRD: ABNORMAL ML/MIN/1.7M2
GLUCOSE SERPL-MCNC: 139 MG/DL (ref 70–99)
HDLC SERPL-MCNC: 39 MG/DL
LDLC SERPL CALC-MCNC: ABNORMAL MG/DL
NONHDLC SERPL-MCNC: 142 MG/DL
POTASSIUM SERPL-SCNC: 3.8 MMOL/L (ref 3.4–5.3)
SODIUM SERPL-SCNC: 140 MMOL/L (ref 133–144)
TRIGL SERPL-MCNC: 511 MG/DL

## 2017-05-18 PROCEDURE — 83721 ASSAY OF BLOOD LIPOPROTEIN: CPT | Performed by: PHYSICIAN ASSISTANT

## 2017-05-18 PROCEDURE — 84460 ALANINE AMINO (ALT) (SGPT): CPT | Performed by: PHYSICIAN ASSISTANT

## 2017-05-18 PROCEDURE — 80048 BASIC METABOLIC PNL TOTAL CA: CPT | Performed by: PHYSICIAN ASSISTANT

## 2017-05-18 PROCEDURE — 80061 LIPID PANEL: CPT | Performed by: PHYSICIAN ASSISTANT

## 2017-05-18 PROCEDURE — 36415 COLL VENOUS BLD VENIPUNCTURE: CPT | Performed by: PHYSICIAN ASSISTANT

## 2017-05-18 PROCEDURE — 99214 OFFICE O/P EST MOD 30 MIN: CPT | Performed by: FAMILY MEDICINE

## 2017-05-18 RX ORDER — TRAMADOL HYDROCHLORIDE 50 MG/1
50-100 TABLET ORAL EVERY 6 HOURS PRN
Qty: 120 TABLET | Refills: 0 | Status: SHIPPED | OUTPATIENT
Start: 2017-05-18 | End: 2017-06-13

## 2017-05-18 RX ORDER — OXYCODONE HYDROCHLORIDE 5 MG/1
5 TABLET ORAL EVERY 4 HOURS PRN
Qty: 70 TABLET | Refills: 0 | Status: SHIPPED | OUTPATIENT
Start: 2017-05-18 | End: 2017-06-04

## 2017-05-18 ASSESSMENT — PAIN SCALES - GENERAL: PAINLEVEL: SEVERE PAIN (7)

## 2017-05-18 NOTE — NURSING NOTE
"Chief Complaint   Patient presents with     RECHECK     BP      Recheck Medication     tramadol and oxicodone.  States that he is in a lot of pain and the medication sin't working as well       Initial /89 (BP Location: Right arm, Patient Position: Chair, Cuff Size: Adult Regular)  Pulse 104  Temp 98.4  F (36.9  C) (Temporal)  Resp 20  Wt 249 lb 11.2 oz (113.3 kg)  SpO2 98%  BMI 36.87 kg/m2 Estimated body mass index is 36.87 kg/(m^2) as calculated from the following:    Height as of 4/11/17: 5' 9\" (1.753 m).    Weight as of this encounter: 249 lb 11.2 oz (113.3 kg).  Medication Reconciliation: complete   Luiza Austin, CHARLES     "

## 2017-05-18 NOTE — PROGRESS NOTES
SUBJECTIVE:                                                    Cecil Vasquez is a 46 year old male who presents to clinic today for the following health issues:    Chief Complaint   Patient presents with     RECHECK     BP      Recheck Medication     tramadol and oxicodone.  States that he is in a lot of pain and the medication sin't working as well        Hypertension Follow-up      Outpatient blood pressures are being checked at home.  Results are 130's/80's.    Low Salt Diet: no added salt       Chronic Pain Follow-Up       Type / Location of Pain: head  Analgesia/pain control:       Recent changes:  worse      Overall control: Inadequate pain control  Activity level/function:      Daily activities:  Can do most things most days, with some rest    Work:  Able to work part time with limitations  Adverse effects:  No  Adherance    Taking medication as directed?  Yes    Participating in other treatments: yes  Risk Factors:    Sleep:  Fair    Mood/anxiety:  worsened    Recent family or social stressors:  none noted    Other aggravating factors: repetitive activities - movement  PHQ-9 SCORE 6/7/2016 11/17/2016 12/16/2016   Total Score - - -   Total Score 0 1 5     LAUREN-7 SCORE 2/9/2015 3/24/2015 12/16/2016   Total Score 0 2 -   Total Score - - 0     Encounter-Level CSA:     There are no encounter-level csa.             Amount of exercise or physical activity: 2-3 days/week for an average of 15-30 minutes    Problems taking medications regularly: No    Medication side effects: none    Diet: low salt    Returns for recheck. He's having worsening neuropathic pain to the face and head. Chronic issue that for some reason is worsening. He acknowledges that his anxiety is worse as well.    ROS:  Constitutional, HEENT, cardiovascular, pulmonary, gi and gu systems are negative, except as otherwise noted.    OBJECTIVE:                                                    /89 (BP Location: Right arm, Patient Position:  Chair, Cuff Size: Adult Regular)  Pulse 104  Temp 98.4  F (36.9  C) (Temporal)  Resp 20  Wt 113.3 kg (249 lb 11.2 oz)  SpO2 98%  BMI 36.87 kg/m2  Body mass index is 36.87 kg/(m^2).    GENERAL: healthy, alert and no distress  NECK: no adenopathy, no asymmetry, masses, or scars and thyroid normal to palpation  RESP: lungs clear to auscultation - no rales, rhonchi or wheezes  CV: regular rate and rhythm, normal S1 S2, no S3 or S4, no murmur, click or rub, no peripheral edema and peripheral pulses strong  ABDOMEN: soft, nontender, no hepatosplenomegaly, no masses and bowel sounds normal  MS: no gross musculoskeletal defects noted, no edema    Diagnostic Test Results:  none      ASSESSMENT/PLAN:                                                        ICD-10-CM    1. Essential hypertension with goal blood pressure less than 140/90 I10    2. Neuropathy (H) G62.9 oxyCODONE (ROXICODONE) 5 MG IR tablet     traMADol (ULTRAM) 50 MG tablet       Continue with pain regimen, increased oxycodone to #70 per month. Expect this to be short term. Declines longer acting at this time, and that's reasonable to hold off until pain clinic sees him. Tramadol to be used during the day, 2-4 tabs of oxycodone for breakthrough pain. He should return to the head and neck pain clinic at the [facility] for recheck. He has achieved the maximum dose of gabapentin that they first suggested. bp controlled. No change.    Recheck  1- 2 mo    Gonzalo Cole MD  [facility]

## 2017-05-22 DIAGNOSIS — E78.5 HYPERLIPIDEMIA LDL GOAL <70: Primary | ICD-10-CM

## 2017-05-22 LAB — LDLC SERPL DIRECT ASSAY-MCNC: 77 MG/DL

## 2017-05-31 ENCOUNTER — TELEPHONE (OUTPATIENT)
Dept: FAMILY MEDICINE | Facility: CLINIC | Age: 47
End: 2017-05-31

## 2017-05-31 ENCOUNTER — CARE COORDINATION (OUTPATIENT)
Dept: CARDIOLOGY | Facility: CLINIC | Age: 47
End: 2017-05-31

## 2017-05-31 DIAGNOSIS — E78.5 HYPERLIPIDEMIA LDL GOAL <70: Primary | ICD-10-CM

## 2017-05-31 RX ORDER — EZETIMIBE 10 MG/1
10 TABLET ORAL DAILY
Qty: 90 TABLET | Refills: 3 | Status: SHIPPED | OUTPATIENT
Start: 2017-05-31 | End: 2017-06-27

## 2017-05-31 NOTE — PROGRESS NOTES
"    Called and spoke with pt and reviewed that LDL cholesterol is much better, down to 77 from 140.  Also reviewed that Ziyad states need to continue to be aggressive with cholesterol given his age and would like pt to start Zetia 10mg daily with repeat lipids/alt in 3 months.  Pt verbalized understanding and agrees with this plan.  Reviewed with pt that Zetia may be expensive, depending on his insurance, will not know until Rx sent to pharmacy.  Advised pt to call [facility] if Rx too expensive or not covered and will then work on getting it covered.  Pt verbalized understanding and agrees with this plan.  Zetia Rx sent to [facility] in [place] as requested by pt.      Pt requests that I document that he continues to have severe pain in bilateral lower legs daily.  He states his PMD has Rx'd oxycodone and tramadol for the pain and has also referred him to a pain clinic. Per chart review, Ziyad's OV note from 3/23/17 states, \"He has bilateral leg pain that was resolved for the first week after but is now back.  This is on a pain management program he is compliant with with his primary.\"   RUSS Lim 9:41 AM 5/31/2017    "

## 2017-05-31 NOTE — PROGRESS NOTES
Received call from Victoria at [facility] who reports that Zetia is not covered by pt's insurance.  Victoria inquires if PA should be initiated.  Victoria does state that pt's insurance does cover gemfibrozil.  Advised Victoria that PA for Zetia should be done.  Victoria states she will send PA in Epic.      Called and spoke with pt.  Informed him that Zetia is not covered by his insurance.  Discussed that PA will be initiated and will call pt once determination from insurance received.  Pt verbalized understanding and agrees with this plan.  RUSS Lim 10:38 AM 5/31/2017

## 2017-06-01 NOTE — TELEPHONE ENCOUNTER
**Please Do Not Close This Encounter**               ** Until This Has Been Addressed**          PRIOR AUTHORIZATION REQUIED PER INSURANCE       PATIENT:Cecil Rosa    DMarianaOMarianaB:1970          MEDICATION:Zetia 10mg                    SI tablet every day       NDC:[address]-84      INSURANCE:[employer]       INSURANCE PHONE #:[phone]      ID #:[id number]      INSURANCE REJECT:Not on Formulary      PHARMACY:[facility]      PHARMACY NPI:[id number]      PHARMACY PHONE #:[phone]    Angelica Dorsey, Pharmacy Technician  [facility]  [phone]                                                            Please let us know when Prior Auth approved/denied, prescriber refusal to complete prior auth or if you would like to change medication/discontinue                                                            Thank you

## 2017-06-04 ENCOUNTER — APPOINTMENT (OUTPATIENT)
Dept: CT IMAGING | Facility: CLINIC | Age: 47
End: 2017-06-04
Attending: PHYSICIAN ASSISTANT
Payer: COMMERCIAL

## 2017-06-04 ENCOUNTER — APPOINTMENT (OUTPATIENT)
Dept: GENERAL RADIOLOGY | Facility: CLINIC | Age: 47
End: 2017-06-04
Attending: PHYSICIAN ASSISTANT
Payer: COMMERCIAL

## 2017-06-04 ENCOUNTER — HOSPITAL ENCOUNTER (EMERGENCY)
Facility: CLINIC | Age: 47
Discharge: HOME | End: 2017-06-05
Attending: PHYSICIAN ASSISTANT | Admitting: PHYSICIAN ASSISTANT
Payer: COMMERCIAL

## 2017-06-04 DIAGNOSIS — M79.604 BILATERAL LOWER EXTREMITY PAIN: ICD-10-CM

## 2017-06-04 DIAGNOSIS — R07.9 CHEST PAIN, UNSPECIFIED TYPE: ICD-10-CM

## 2017-06-04 DIAGNOSIS — F41.9 ANXIETY: ICD-10-CM

## 2017-06-04 DIAGNOSIS — M79.605 LEFT LEG PAIN: ICD-10-CM

## 2017-06-04 DIAGNOSIS — M79.605 BILATERAL LOWER EXTREMITY PAIN: ICD-10-CM

## 2017-06-04 DIAGNOSIS — M79.604 RIGHT LEG PAIN: ICD-10-CM

## 2017-06-04 LAB
ALBUMIN SERPL-MCNC: 3.9 G/DL (ref 3.4–5)
ALP SERPL-CCNC: 70 U/L (ref 40–150)
ALT SERPL W P-5'-P-CCNC: 31 U/L (ref 0–70)
ANION GAP SERPL CALCULATED.3IONS-SCNC: 7 MMOL/L (ref 3–14)
AST SERPL W P-5'-P-CCNC: 18 U/L (ref 0–45)
BASE EXCESS BLDV CALC-SCNC: 2.6 MMOL/L
BASOPHILS # BLD AUTO: 0 10E9/L (ref 0–0.2)
BASOPHILS NFR BLD AUTO: 0.2 %
BILIRUB SERPL-MCNC: 0.2 MG/DL (ref 0.2–1.3)
BUN SERPL-MCNC: 28 MG/DL (ref 7–30)
CALCIUM SERPL-MCNC: 8.2 MG/DL (ref 8.5–10.1)
CHLORIDE SERPL-SCNC: 108 MMOL/L (ref 94–109)
CO2 SERPL-SCNC: 29 MMOL/L (ref 20–32)
CREAT SERPL-MCNC: 1.04 MG/DL (ref 0.66–1.25)
CRP SERPL-MCNC: 5.2 MG/L (ref 0–8)
D DIMER PPP FEU-MCNC: 0.3 UG/ML FEU (ref 0–0.5)
DIFFERENTIAL METHOD BLD: ABNORMAL
EOSINOPHIL # BLD AUTO: 0.1 10E9/L (ref 0–0.7)
EOSINOPHIL NFR BLD AUTO: 1.3 %
ERYTHROCYTE [DISTWIDTH] IN BLOOD BY AUTOMATED COUNT: 12.9 % (ref 10–15)
GFR SERPL CREATININE-BSD FRML MDRD: 76 ML/MIN/1.7M2
GLUCOSE SERPL-MCNC: 117 MG/DL (ref 70–99)
HCO3 BLDV-SCNC: 28 MMOL/L (ref 21–28)
HCT VFR BLD AUTO: 39.1 % (ref 40–53)
HGB BLD-MCNC: 12.5 G/DL (ref 13.3–17.7)
IMM GRANULOCYTES # BLD: 0 10E9/L (ref 0–0.4)
IMM GRANULOCYTES NFR BLD: 0.5 %
LIPASE SERPL-CCNC: 240 U/L (ref 73–393)
LYMPHOCYTES # BLD AUTO: 2.5 10E9/L (ref 0.8–5.3)
LYMPHOCYTES NFR BLD AUTO: 28.8 %
MCH RBC QN AUTO: 28.3 PG (ref 26.5–33)
MCHC RBC AUTO-ENTMCNC: 32 G/DL (ref 31.5–36.5)
MCV RBC AUTO: 89 FL (ref 78–100)
MONOCYTES # BLD AUTO: 0.6 10E9/L (ref 0–1.3)
MONOCYTES NFR BLD AUTO: 7 %
NEUTROPHILS # BLD AUTO: 5.4 10E9/L (ref 1.6–8.3)
NEUTROPHILS NFR BLD AUTO: 62.2 %
NT-PROBNP SERPL-MCNC: 40 PG/ML (ref 0–450)
O2/TOTAL GAS SETTING VFR VENT: 21 %
PCO2 BLDV: 49 MM HG (ref 40–50)
PH BLDV: 7.37 PH (ref 7.32–7.43)
PLATELET # BLD AUTO: 192 10E9/L (ref 150–450)
PO2 BLDV: 27 MM HG (ref 25–47)
POTASSIUM SERPL-SCNC: 4.2 MMOL/L (ref 3.4–5.3)
PROT SERPL-MCNC: 6.8 G/DL (ref 6.8–8.8)
RBC # BLD AUTO: 4.42 10E12/L (ref 4.4–5.9)
SODIUM SERPL-SCNC: 144 MMOL/L (ref 133–144)
TROPONIN I SERPL-MCNC: NORMAL UG/L (ref 0–0.04)
WBC # BLD AUTO: 8.7 10E9/L (ref 4–11)

## 2017-06-04 PROCEDURE — 99285 EMERGENCY DEPT VISIT HI MDM: CPT | Mod: 25 | Performed by: PHYSICIAN ASSISTANT

## 2017-06-04 PROCEDURE — 25000128 H RX IP 250 OP 636: Performed by: PHYSICIAN ASSISTANT

## 2017-06-04 PROCEDURE — 96361 HYDRATE IV INFUSION ADD-ON: CPT | Performed by: PHYSICIAN ASSISTANT

## 2017-06-04 PROCEDURE — 86140 C-REACTIVE PROTEIN: CPT | Performed by: PHYSICIAN ASSISTANT

## 2017-06-04 PROCEDURE — 83690 ASSAY OF LIPASE: CPT | Performed by: PHYSICIAN ASSISTANT

## 2017-06-04 PROCEDURE — 84484 ASSAY OF TROPONIN QUANT: CPT | Performed by: PHYSICIAN ASSISTANT

## 2017-06-04 PROCEDURE — 93010 ELECTROCARDIOGRAM REPORT: CPT | Mod: Z6 | Performed by: PHYSICIAN ASSISTANT

## 2017-06-04 PROCEDURE — 93010 ELECTROCARDIOGRAM REPORT: CPT | Mod: 76 | Performed by: PHYSICIAN ASSISTANT

## 2017-06-04 PROCEDURE — 25000125 ZZHC RX 250: Performed by: PHYSICIAN ASSISTANT

## 2017-06-04 PROCEDURE — 80053 COMPREHEN METABOLIC PANEL: CPT | Performed by: PHYSICIAN ASSISTANT

## 2017-06-04 PROCEDURE — 93005 ELECTROCARDIOGRAM TRACING: CPT | Mod: 76 | Performed by: PHYSICIAN ASSISTANT

## 2017-06-04 PROCEDURE — 96374 THER/PROPH/DIAG INJ IV PUSH: CPT | Mod: 59 | Performed by: PHYSICIAN ASSISTANT

## 2017-06-04 PROCEDURE — 25000132 ZZH RX MED GY IP 250 OP 250 PS 637: Performed by: PHYSICIAN ASSISTANT

## 2017-06-04 PROCEDURE — 93005 ELECTROCARDIOGRAM TRACING: CPT | Performed by: PHYSICIAN ASSISTANT

## 2017-06-04 PROCEDURE — 71010 XR CHEST PORT 1 VW: CPT | Mod: TC

## 2017-06-04 PROCEDURE — 82803 BLOOD GASES ANY COMBINATION: CPT | Performed by: PHYSICIAN ASSISTANT

## 2017-06-04 PROCEDURE — 96376 TX/PRO/DX INJ SAME DRUG ADON: CPT | Performed by: PHYSICIAN ASSISTANT

## 2017-06-04 PROCEDURE — 85379 FIBRIN DEGRADATION QUANT: CPT | Performed by: PHYSICIAN ASSISTANT

## 2017-06-04 PROCEDURE — 74160 CT ABDOMEN W/CONTRAST: CPT

## 2017-06-04 PROCEDURE — 85025 COMPLETE CBC W/AUTO DIFF WBC: CPT | Performed by: PHYSICIAN ASSISTANT

## 2017-06-04 PROCEDURE — 83880 ASSAY OF NATRIURETIC PEPTIDE: CPT | Performed by: PHYSICIAN ASSISTANT

## 2017-06-04 RX ORDER — LORAZEPAM 2 MG/ML
1 INJECTION INTRAMUSCULAR ONCE
Status: COMPLETED | OUTPATIENT
Start: 2017-06-04 | End: 2017-06-04

## 2017-06-04 RX ORDER — SODIUM CHLORIDE 9 MG/ML
1000 INJECTION, SOLUTION INTRAVENOUS CONTINUOUS
Status: DISCONTINUED | OUTPATIENT
Start: 2017-06-04 | End: 2017-06-05 | Stop reason: HOSPADM

## 2017-06-04 RX ORDER — ALUMINA, MAGNESIA, AND SIMETHICONE 2400; 2400; 240 MG/30ML; MG/30ML; MG/30ML
15 SUSPENSION ORAL ONCE
Status: COMPLETED | OUTPATIENT
Start: 2017-06-04 | End: 2017-06-04

## 2017-06-04 RX ORDER — IOPAMIDOL 755 MG/ML
200 INJECTION, SOLUTION INTRAVASCULAR ONCE
Status: COMPLETED | OUTPATIENT
Start: 2017-06-04 | End: 2017-06-04

## 2017-06-04 RX ORDER — ASPIRIN 81 MG/1
324 TABLET, CHEWABLE ORAL ONCE
Status: COMPLETED | OUTPATIENT
Start: 2017-06-04 | End: 2017-06-04

## 2017-06-04 RX ADMIN — LIDOCAINE HYDROCHLORIDE 15 ML: 20 SOLUTION ORAL; TOPICAL at 22:58

## 2017-06-04 RX ADMIN — SODIUM CHLORIDE 80 ML: 9 INJECTION, SOLUTION INTRAVENOUS at 23:52

## 2017-06-04 RX ADMIN — NITROGLYCERIN 15 MG: 20 OINTMENT TOPICAL at 23:44

## 2017-06-04 RX ADMIN — SODIUM CHLORIDE 1000 ML: 9 INJECTION, SOLUTION INTRAVENOUS at 22:59

## 2017-06-04 RX ADMIN — IOPAMIDOL 85 ML: 755 INJECTION, SOLUTION INTRAVENOUS at 23:51

## 2017-06-04 RX ADMIN — ASPIRIN 81 MG 324 MG: 81 TABLET ORAL at 22:59

## 2017-06-04 RX ADMIN — ALUMINUM HYDROXIDE, MAGNESIUM HYDROXIDE, AND DIMETHICONE 15 ML: 400; 400; 40 SUSPENSION ORAL at 22:59

## 2017-06-04 RX ADMIN — LORAZEPAM 1 MG: 2 INJECTION INTRAMUSCULAR; INTRAVENOUS at 22:59

## 2017-06-04 NOTE — ED AVS SNAPSHOT
[facility] Emergency Department    [address] [place] [address]    Phone:  [phone]    Fax:  [phone]                                       Cecil Vasquez   MRN: [id number]    Department:  [facility] Emergency Department   Date of Visit:  6/4/2017           Patient Information     Date Of Birth          1970        Your diagnoses for this visit were:     Chest pain, unspecified type     Anxiety     Bilateral lower extremity pain        You were seen by Guillermo Pennington PA-C.      Follow-up Information     Follow up with Gonzalo Cole MD In 4 days.    Specialty:  Family Practice    Why:  For anxiety and chest pain recheck    Contact information:    [facility]  [address] [place] [address] [address] [place] [id number] [phone]          Discharge Instructions       1.  Co Enzyme Q 10 is the vitamin supplement that we discussed this evening that could potentially help with your leg discomfort.    2.  Please keep your appointment with Dr. Cole for the end of this week to discuss your anxiety in more detail.    3.  Please call your Cardiologist this morning to discuss getting set up with a recheck appointment to discuss your chest pain.    It was a pleasure meeting you today!  Thankfully, we did not find anything overly concerning with your evaluation this evening.        Future Appointments        Provider Department Dept Phone Center    6/15/2017 10:00 AM Rose Durham PA-C [facility] [phone] [facility]      24 Hour Appointment Hotline       To make an appointment at any [facility], call [phone] ([phone]). If you don't have a family doctor or clinic, we will help you find one. [facility] are conveniently located to serve the needs of you and your family.             Review of your medicines      START taking        Dose / Directions Last dose taken    diazepam 5 MG tablet   Commonly known as:  VALIUM   Dose:   5 mg   Quantity:  6 tablet        Take 1 tablet (5 mg) by mouth every 12 hours as needed for anxiety or agitation   Refills:  0        oxyCODONE 5 MG IR tablet   Commonly known as:  ROXICODONE   Dose:  5 mg   Quantity:  6 tablet        Take 1 tablet (5 mg) by mouth every 6 hours as needed for pain   Refills:  0          Our records show that you are taking the medicines listed below. If these are incorrect, please call your family doctor or clinic.        Dose / Directions Last dose taken    aspirin 81 MG EC tablet   Dose:  81 mg   Quantity:  90 tablet        Take 1 tablet (81 mg) by mouth daily   Refills:  0        ezetimibe 10 MG tablet   Commonly known as:  ZETIA   Dose:  10 mg   Quantity:  90 tablet        Take 1 tablet (10 mg) by mouth daily   Refills:  3        gabapentin 600 MG tablet   Commonly known as:  NEURONTIN   Quantity:  240 tablet        TAKE TWO TABLETS BY MOUTH THREE TIMES A DAY, increase by 1 tab every 2-3 days, until max of 5400 mg daily   Refills:  1        ipratropium - albuterol 0.5 mg/2.5 mg/3 mL 0.5-2.5 (3) MG/3ML neb solution   Commonly known as:  DUONEB   Dose:  1 vial   Quantity:  30 vial        Take 1 vial (3 mLs) by nebulization every 4 hours as needed for shortness of breath / dyspnea   Refills:  0        lisinopril 10 MG tablet   Commonly known as:  PRINIVIL/ZESTRIL   Dose:  10 mg   Quantity:  90 tablet        Take 1 tablet (10 mg) by mouth daily   Refills:  3        MAPAP 325 MG tablet   Quantity:  100 tablet   Generic drug:  acetaminophen        TAKE TWO TABLETS BY MOUTH EVERY 4 HOURS AS NEEDED FOR MILD PAIN   Refills:  11        melatonin 3 MG tablet   Dose:  3 mg   Quantity:  30 tablet        Take 1 tablet (3 mg) by mouth nightly as needed for sleep   Refills:  1        * metoprolol 50 MG 24 hr tablet   Commonly known as:  TOPROL-XL   Dose:  50 mg   Quantity:  90 tablet        Take 1 tablet (50 mg) by mouth daily   Refills:  3        * metoprolol 25 MG 24 hr tablet   Commonly  known as:  TOPROL-XL   Quantity:  30 tablet        TAKE ONE TABLET BY MOUTH EVERY DAY   Refills:  11        nitroglycerin 0.4 MG sublingual tablet   Commonly known as:  NITROSTAT   Quantity:  25 tablet        For chest pain place 1 tablet under the tongue every 5 minutes for 3 doses. If symptoms persist 5 minutes after 1st dose call 911.   Refills:  3        prasugrel 10 MG Tabs tablet   Commonly known as:  EFFIENT   Dose:  10 mg   Quantity:  90 tablet        Take 1 tablet (10 mg) by mouth daily   Refills:  3        rosuvastatin 40 MG tablet   Commonly known as:  CRESTOR   Dose:  40 mg   Quantity:  90 tablet        Take 1 tablet (40 mg) by mouth daily   Refills:  3        traMADol 50 MG tablet   Commonly known as:  ULTRAM   Dose:   mg   Quantity:  120 tablet        Take 1-2 tablets ( mg) by mouth every 6 hours as needed for moderate pain   Refills:  0        VENTOLIN  (90 BASE) MCG/ACT Inhaler   Quantity:  18 g   Generic drug:  albuterol        INHALE 2 PUFFS INTO THE LUNGS EVERY 4 HOURS AS NEEDED FOR SHORTNESS OF BREATH OR DIFFICULTY BREATHING   Refills:  0        * Notice:  This list has 2 medication(s) that are the same as other medications prescribed for you. Read the directions carefully, and ask your doctor or other care provider to review them with you.            Prescriptions were sent or printed at these locations (2 Prescriptions)                   [facility]   [address] [address]    Telephone:  [phone]   Fax:  [phone]   Hours:                  Printed at Department/Unit printer (2 of 2)         diazepam (VALIUM) 5 MG tablet               oxyCODONE (ROXICODONE) 5 MG IR tablet                Procedures and tests performed during your visit     Procedure/Test Number of Times Performed    Blood gas venous 1    CBC with platelets differential 1    CRP inflammation 1    CT Chest Abdomen w Contrast 1    Cardiac Continuous Monitoring  1    Comprehensive metabolic panel 1    D dimer quantitative 1    EKG 12-lead, tracing only 2    Lipase 1    Nt probnp inpatient (BNP) 1    Peripheral IV: Standard 1    Pulse oximetry nursing 1    Troponin I 2    XR Chest Port 1 View 1      Orders Needing Specimen Collection     None      Pending Results     No orders found for last 3 day(s).            Pending Culture Results     No orders found for last 3 day(s).            Pending Results Instructions     If you had any lab results that were not finalized at the time of your Discharge, you can call the ED Lab Result RN at [phone]. You will be contacted by this team for any positive Lab results or changes in treatment. The nurses are available 7 days a week from 10A to 6:30P.  You can leave a message 24 hours per day and they will return your call.        Thank you for choosing [facility]       Thank you for choosing [facility] for your care. Our goal is always to provide you with excellent care. Hearing back from our patients is one way we can continue to improve our services. Please take a few minutes to complete the written survey that you may receive in the mail after you visit with us. Thank you!        Care EveryWhere ID     This is your Care EveryWhere ID. This could be used by other organizations to access your [facility] medical records  [id number]        After Visit Summary       This is your record. Keep this with you and show to your community pharmacist(s) and doctor(s) at your next visit.

## 2017-06-04 NOTE — ED AVS SNAPSHOT
[facility] Emergency Department    [address] [place] [address]    Phone:  [phone]    Fax:  [phone]                                       Cecil Vasquez   MRN: [id number]    Department:  [facility] Emergency Department   Date of Visit:  6/4/2017           After Visit Summary Signature Page     I have received my discharge instructions, and my questions have been answered. I have discussed any challenges I see with this plan with the nurse or doctor.    ..........................................................................................................................................  Patient/Patient Representative Signature      ..........................................................................................................................................  Patient Representative Print Name and Relationship to Patient    ..................................................               ................................................  Date                                            Time    ..........................................................................................................................................  Reviewed by Signature/Title    ...................................................              ..............................................  Date                                                            Time

## 2017-06-05 VITALS
RESPIRATION RATE: 16 BRPM | SYSTOLIC BLOOD PRESSURE: 139 MMHG | BODY MASS INDEX: 36.89 KG/M2 | TEMPERATURE: 98.9 F | WEIGHT: 249.78 LBS | HEART RATE: 76 BPM | DIASTOLIC BLOOD PRESSURE: 88 MMHG | OXYGEN SATURATION: 96 %

## 2017-06-05 LAB — TROPONIN I SERPL-MCNC: NORMAL UG/L (ref 0–0.04)

## 2017-06-05 PROCEDURE — 36415 COLL VENOUS BLD VENIPUNCTURE: CPT | Performed by: PHYSICIAN ASSISTANT

## 2017-06-05 PROCEDURE — 25000125 ZZHC RX 250: Performed by: PHYSICIAN ASSISTANT

## 2017-06-05 PROCEDURE — 96375 TX/PRO/DX INJ NEW DRUG ADDON: CPT | Performed by: PHYSICIAN ASSISTANT

## 2017-06-05 PROCEDURE — 84484 ASSAY OF TROPONIN QUANT: CPT | Performed by: PHYSICIAN ASSISTANT

## 2017-06-05 PROCEDURE — 25000128 H RX IP 250 OP 636: Performed by: PHYSICIAN ASSISTANT

## 2017-06-05 RX ORDER — OXYCODONE HYDROCHLORIDE 5 MG/1
5 TABLET ORAL EVERY 6 HOURS PRN
Qty: 6 TABLET | Refills: 0 | Status: SHIPPED | OUTPATIENT
Start: 2017-06-05 | End: 2017-06-13

## 2017-06-05 RX ORDER — DIAZEPAM 5 MG
5 TABLET ORAL EVERY 12 HOURS PRN
Qty: 6 TABLET | Refills: 0 | Status: SHIPPED | OUTPATIENT
Start: 2017-06-05 | End: 2017-08-01

## 2017-06-05 RX ORDER — LORAZEPAM 2 MG/ML
1 INJECTION INTRAMUSCULAR ONCE
Status: COMPLETED | OUTPATIENT
Start: 2017-06-05 | End: 2017-06-05

## 2017-06-05 RX ORDER — FENTANYL CITRATE 50 UG/ML
50 INJECTION, SOLUTION INTRAMUSCULAR; INTRAVENOUS ONCE
Status: COMPLETED | OUTPATIENT
Start: 2017-06-05 | End: 2017-06-05

## 2017-06-05 RX ORDER — HYDROMORPHONE HYDROCHLORIDE 1 MG/ML
0.5 INJECTION, SOLUTION INTRAMUSCULAR; INTRAVENOUS; SUBCUTANEOUS
Status: COMPLETED | OUTPATIENT
Start: 2017-06-05 | End: 2017-06-05

## 2017-06-05 RX ADMIN — LORAZEPAM 1 MG: 2 INJECTION INTRAMUSCULAR; INTRAVENOUS at 01:14

## 2017-06-05 RX ADMIN — FENTANYL CITRATE 50 MCG: 50 INJECTION, SOLUTION INTRAMUSCULAR; INTRAVENOUS at 00:40

## 2017-06-05 RX ADMIN — HYDROMORPHONE HYDROCHLORIDE 0.5 MG: 1 INJECTION, SOLUTION INTRAMUSCULAR; INTRAVENOUS; SUBCUTANEOUS at 03:05

## 2017-06-05 RX ADMIN — HYDROMORPHONE HYDROCHLORIDE 0.5 MG: 1 INJECTION, SOLUTION INTRAMUSCULAR; INTRAVENOUS; SUBCUTANEOUS at 01:55

## 2017-06-05 RX ADMIN — HYDROMORPHONE HYDROCHLORIDE 0.5 MG: 1 INJECTION, SOLUTION INTRAMUSCULAR; INTRAVENOUS; SUBCUTANEOUS at 02:17

## 2017-06-05 NOTE — ED NOTES
Pt states pain is now a 1/10.  Spoke with pt about a ride for discharge.  Pt states that his ride is here now and is in the parking lot waiting.  Reviewed discharge instructions with pt.

## 2017-06-05 NOTE — ED NOTES
"Pt presents with left sided chest pain for 3 hours, getting progressively worse.  He describes it as a \"crushing ache\", non-radiating, with nausea.  He reports stents placement a couple of months ago at [facility].   "

## 2017-06-05 NOTE — ED NOTES
Pt reports no relief from the Fentanyl.  He is becoming increasingly anxious.  He is open to trying some aromatherapy, provider placing orders for more Ativan.

## 2017-06-05 NOTE — ED NOTES
Pt reports that orange oil and Ativan made his breathing a little easier, chest pain has not changed.  Bedside report given to Divina

## 2017-06-05 NOTE — DISCHARGE INSTRUCTIONS
1.  Co Enzyme Q 10 is the vitamin supplement that we discussed this evening that could potentially help with your leg discomfort.    2.  Please keep your appointment with Dr. Cole for the end of this week to discuss your anxiety in more detail.    3.  Please call your Cardiologist this morning to discuss getting set up with a recheck appointment to discuss your chest pain.    It was a pleasure meeting you today!  Thankfully, we did not find anything overly concerning with your evaluation this evening.

## 2017-06-05 NOTE — ED NOTES
Pt reports that he feels calmer, breathing feels more normal but he is still having the chest pain in the same location, same severity.  Provider is updated.

## 2017-06-05 NOTE — ED PROVIDER NOTES
"  History     Chief Complaint   Patient presents with     Chest Pain     HPI  Cecilio Vasquez is a 46 year old male who presents for evaluation of chest pain that started about 3 hours prior to arrival. He states that he woke up this morning and just felt lethargic. Lethargy feeling persisted throughout the day. He states that he did not even eat anything throughout the day. He started experience some chest pain about 3 hours prior at rest. He states the pain is building and is \"now intolerable.\"  He reports the pain being 6 on a scale of 10. He describes the pain is a \"crushing weight on my chest\". It is more inflamed mid to left mid sternal chest discomfort. He states \"I have this impending doom feeling\". He states that this pain is different from the pain he experienced prior to his cardiac stent placement. He does note some mild dyspnea and has developed nausea in the past 20 minutes. No diaphoresis, arm pain, or neck radiation pain. No abdominal discomfort or acid reflux type symptoms. Denies any fevers or chills. No cough. He does feel very anxious. He is actually asking for something to treat his anxiety. He feels that his anxiety makes his pain much worse. He tried nitroglycerin ×4 prior to his arrival without any significant change in his symptoms. Last nitroglycerin administration was about 15 minutes prior to arrival in the ED. He also tried Tylenol earlier today without any particular improvement. As any lightheadedness or dizziness. No syncope in the last 3 hours. He actually drove himself here. He reports that the last time he was evaluated in the ED for chest pain that he developed very severe hypotension with nitroglycerin treatment.  He denies any recent long car rides or plane rides. Denies any past blood clots. Denies any lower extremity edema or pain.        GATED MYOCARDIAL PERFUSION SCINTIGRAPHY EXERCISE- ONE DAY STUDY      4/12/2017 12:43 PM  CECILIO VASQUEZ  46 years  Male  " 1970.     Indication/Clinical History: 46-year-old male undergoing stress test  for chest pain. In February 2017 he underwent stent to the proximal  LAD, he also had a moderate RCA lesion.     Impression       1. Exercise: Average exercise capacity with target heart rate  achieved       2. EKG: Negative for ischemia       3. Symptoms: No chest pain with exercise  4. Myocardial perfusion imaging using single isotope technique  demonstrated normal perfusion, no ischemia or infarct.   5. Gated images demonstrated normal wall motion.  The left ventricular  systolic function is 70%.  6. No previous study for comparison.     Procedure  The patient performed treadmill exercise using a Pieter protocol,  completing 9 minutes and 0 seconds with an estimated workload of 10.4  METS.  The test was terminated due to fatigue. The heart rate was 79  beats per minute at baseline and increased to 148 beats at peak  exercise, which was 91% of the maximum predicted heart rate. The rest  blood pressure was 122/60 mm/Hg and peak blood pressure is 158/68mm/Hg  with rate pressure product of 26,300. The patient experienced no chest  pain during the test. The patient was on a beta blocker.     Myocardial perfusion imaging was performed at rest, approximately 45  minutes after the injection intravenously of 12.1of Tc-99m Myoview. At  peak exercise, the patient was injected intravenously with 37.7 mCi of   Tc-99m Myoview and exercise continued for approximately 1 minute.  Gated post-stress tomographic imaging was performed approximately 30  minutes after stress     EKG Findings  The resting EKG demonstrated sinus rhythm, no baseline ST segment  abnormalities . The stress EKG demonstrated no changes from baseline,  negative for ischemia.     Tomographic Findings  Overall, the study quality is very good . On the stress images, normal  perfusion. On the rest images, normal perfusion . Gated images  demonstrated normal wall motion. The left  ventricular ejection  fraction was calculated to be 70%. TID was was not appreciated.     ADRIANA VILLASENOR MD    I have reviewed the Medications, Allergies, Past Medical and Surgical History, and Social History in the [facility] system.    Past Medical History:   Diagnosis Date     ADHD (attention deficit hyperactivity disorder)      Bipolar 1 disorder (H)      Depression      Gastro-oesophageal reflux disease      Hyperlipidaemia      Hypertension      Impaired fasting glucose      Nephrolithiasis      Obesity      Reactive airway disease     uses albuterol inhaler when has cold     Renal calcification      Rosacea      Tobacco abuse      Trigeminal neuralgia      Uncomplicated asthma         Patient Active Problem List   Diagnosis     Bipolar disorder (H)     Intermittent asthma     Hyperlipidemia LDL goal <70     Impaired fasting glucose     Attention deficit hyperactivity disorder (ADHD)     Depression, major, recurrent, in remission (H)     Obesity     Rosacea     Head and face pain     Renal calcification     Nephrolithiasis     Essential hypertension with goal blood pressure less than 140/90     Neuropathy (H)     ACS (acute coronary syndrome) (H)     CAD-  PHYLLIS to prox LAD for 99% stenosis, Remaining 60% distal rca     Chronic pain syndrome     Chest pain        Past Surgical History:   Procedure Laterality Date     COMBINED CYSTOSCOPY, INSERT CATHETER URETER Left 3/25/2016    Procedure: COMBINED CYSTOSCOPY, INSERT CATHETER URETER;  Surgeon: Jorden Villafana MD;  Location:  OR     COMBINED CYSTOSCOPY, RETROGRADES, URETEROSCOPY, LASER HOLMIUM LITHOTRIPSY URETER(S), INSERT STENT Left 4/13/2016    Procedure: COMBINED CYSTOSCOPY, RETROGRADES, URETEROSCOPY, LASER HOLMIUM LITHOTRIPSY URETER(S), INSERT STENT;  Surgeon: Jorden Villafana MD;  Location:  OR     EXTRACORPOREAL SHOCK WAVE LITHOTRIPSY (ESWL) Left 3/25/2016    Procedure: EXTRACORPOREAL SHOCK WAVE LITHOTRIPSY (ESWL);  Surgeon: Jorden Villafana  MD Cosmo;  Location:  OR     EXTRACORPOREAL SHOCK WAVE LITHOTRIPSY, CYSTOSCOPY, INSERT STENT URETER(S), COMBINED Left 3/25/2016    Procedure: COMBINED EXTRACORPOREAL SHOCK WAVE LITHOTRIPSY, CYSTOSCOPY, INSERT STENT URETER(S);  Surgeon: Jorden Villafana MD;  Location:  OR     GENITOURINARY SURGERY  3 25 2016     Lithotripsy and stent placement     TONSILLECTOMY          Social History     Social History     Marital status:      Spouse name: N/A     Number of children: N/A     Years of education: N/A     Occupational History     Not on file.     Social History Main Topics     Smoking status: Former Smoker     Packs/day: 0.50     Types: Cigarettes     Smokeless tobacco: Never Used     Alcohol use No     Drug use: No     Sexual activity: Not Currently     Other Topics Concern      Service No     Blood Transfusions No     Caffeine Concern No     Occupational Exposure No     Hobby Hazards No     Sleep Concern Yes     Stress Concern No     Weight Concern Yes     Special Diet No     low sodium     Back Care No     Exercise No     Seat Belt Yes     Self-Exams Yes     Social History Narrative        3 children    Working at [employer]        Family History   Problem Relation Age of Onset     Psychotic Disorder Mother      DIABETES Sister      Psychotic Disorder Sister           Current Facility-Administered Medications   Medication     sodium chloride (PF) 0.9% PF flush 3 mL     sodium chloride (PF) 0.9% PF flush 3 mL     0.9% sodium chloride infusion     nitroglycerin (NITRO-BID) ointment REMOVAL     Current Outpatient Prescriptions   Medication     diazepam (VALIUM) 5 MG tablet     oxyCODONE (ROXICODONE) 5 MG IR tablet     ezetimibe (ZETIA) 10 MG tablet     traMADol (ULTRAM) 50 MG tablet     gabapentin (NEURONTIN) 600 MG tablet     metoprolol (TOPROL-XL) 25 MG 24 hr tablet     aspirin 81 MG EC tablet     MAPAP 325 MG tablet     lisinopril (PRINIVIL/ZESTRIL) 10 MG tablet     metoprolol  (TOPROL-XL) 50 MG 24 hr tablet     prasugrel (EFFIENT) 10 MG TABS tablet     rosuvastatin (CRESTOR) 40 MG tablet     nitroglycerin (NITROSTAT) 0.4 MG sublingual tablet     melatonin 3 MG tablet     VENTOLIN  (90 BASE) MCG/ACT Inhaler     ipratropium - albuterol 0.5 mg/2.5 mg/3 mL (DUONEB) 0.5-2.5 (3) MG/3ML neb solution           Allergies   Allergen Reactions     No Known Drug Allergies         Review of Systems   All other systems reviewed and are negative.      Physical Exam   BP: 133/80  Pulse: 76  Temp: 98.9  F (37.2  C)  Resp: 20  Weight: 113.3 kg (249 lb 12.5 oz)  SpO2: 98 %  Physical Exam  He does appear in distress. He is taking big breaths. He appears anxious. He is standing when I come in the room, but he does sit down. I did review his EKG right away which would appear normal.   Skin:  No rashes or lesions are noted on inspection of the torso, face, and upper extremities.   Head: Normocephalic, atraumatic, nontender to palpation  Eyes: PERRLA, conjunctiva and sclera clear  Ears: Bilateral TM's and canals are clear.  TM's translucent without erythema or effusion.  Nose: Nares normal and patent bilaterally.  Mucous membranes are non-erythematous and non-edematous.  No sinus tenderness.  Throat: Mucous membranes are clear.  No tonsilar hypertrophy, exudate, or erythema.  Neck: Supple.  FROM without pain.  No adenopathy.  No thyromegaly.   Heart:  RRR with normal S1 and S2.  No S3 or S4.  No murmur, rub, gallop, or click.  PMI is nondisplaced.   Lungs:  CTA bilaterally without wheezes, rales, or rhonchi.  Good breath sounds heard throughout all lung fields.  Tympanitic to percussion with no areas of dullness.   Chest: He does have some tenderness with palpation over the left mid sternal area above the rib 5 level. No pain with AP or lateral compression of the chest wall. No crepitus.  Abdomen: Positive bowel sounds in all four quadrants.  No tenderness to palpation throughout.  No rebound and no  guarding.  No hepatosplenomegaly.  No masses.   Extremities: extremities, peripheral pulses and reflexes normal, no edema, redness or tenderness in the calves or thighs, Kenneth's sign is negative, no sign of DVT.         ED Course     ED Course     Procedures             EKG Interpretation:      Interpreted by Guillermo Pennington  Time reviewed: 2240  Symptoms at time of EKG: chest pain   Rhythm: normal sinus   Rate: normal  Axis: normal  Ectopy: none  Conduction: normal  ST Segments/ T Waves: No ST-T wave changes  Q Waves: none  Comparison to prior: Unchanged    Clinical Impression: normal EKG      Patient has a significant cardiac history and has crushing chest discomfort over the past 3 hours. He is very anxious at this time and is actually asking for antianxiety medication. I think it is appropriate to give him some Ativan 1 mg. I reviewed his chart and he tolerated 1 mg of Ativan during well during a visit on 4/2017. EKG is normal at this time, but I'm still concerned about cardiac ischemia. He has not had any improvement with 4 nitroglycerin at home to this point. Regardless, we are going to administer aspirin. I'm concerned about his blood pressure bottoming a with any more short-acting nitroglycerin. He is in the 120s over 80s currently. He is already received 4 nitroglycerin at home. We are going to try a GI cocktail but then also administered nitro paste. Labs pending. Chest x-ray pending. We will monitor his status very closely.    11:23 PM - the patient's labs and x-rays return. Everything is within normal limits. Troponin and d-dimer normal. Chest x-ray without mediastinal widening. After administration of Ativan and the GI cocktail, the nature and persistence of his pain has not improved. We are going to place Nitropaste on, but I am concerned given the nature of his pain. He describes a sharp tearing sensation in the midsternal area. I feel I need to rule out dissection. Therefore, I'm going to order a  CT study.      11:28 PM - the nurse just checked his blood pressure in the opposite arm. He is about 19 points lower systolic in the right arm that he is on the left. Therefore, this further supports us doing a chest CT study. Orders placed.          12:26 AM - patient is still complaining of chest pain despite the Nitropaste. I discussed his case with Dr. Baker, ED MD. He agreed at this point it would be okay to treat with narcotic analgesia will we are waiting for the CT results.  Repeat EKG is pending. We will also need to recheck a 3 hour troponin. He will likely need to stay for chest pain observation.           EKG Interpretation:      Interpreted by Guillermo Pennington  Time reviewed: 0032  Symptoms at time of EKG: chest pain   Rhythm: normal sinus   Rate: normal  Axis: normal  Ectopy: none  Conduction: normal  ST Segments/ T Waves: No ST-T wave changes  Q Waves: none  Comparison to prior: Unchanged    Clinical Impression: normal EKG      2:23 AM - I spoke with Dr. Alvarez, Inpatient Hospitalist, regarding the patient and a possible chest pain observation stay.  He feels that the patient has been ruled out for cardiac ischemia.  Negative stress thallium on 4/12/17.  He has had chest pain for over 6 hours, and he still has undetectable troponins x 2.  He stated that he would not even repeat troponins during an observation stay.  Dr. Alvarez felt that his chest pain is likely secondary to anxiety given that all other etiologies have been ruled out.  He did not even feel that a repeat stress test would be indicated at this time.             Critical Care time:  none          Results for orders placed or performed during the hospital encounter of 06/04/17   XR Chest Port 1 View    Narrative    CHEST SINGLE VIEW PORTABLE   6/4/2017 11:14 PM     HISTORY: Mid chest pain.    COMPARISON: 4/11/2017.    FINDINGS: The lungs are clear. Normal-sized cardiac silhouette.      Impression    IMPRESSION: No evidence of active  cardiopulmonary disease.    NICK BRISCOE MD   CT Chest Abdomen w Contrast    Narrative    CT CHEST AND ABDOMEN WITH CONTRAST   6/5/2017 12:04 AM     HISTORY: Tearing chest pain. Concern for dissection.    COMPARISON: 3/22/2016 - CT abdomen and pelvis.    TECHNIQUE: Following the uneventful administration of 85 mL Isovue-370  intravenous contrast, helical sections were acquired from the lung  apices through the aortic bifurcation according to the aorta protocol.  Coronal reconstructions were generated. Radiation dose for this scan  was reduced using automated exposure control, adjustment of the mA  and/or kV according to the patient's size, or iterative reconstruction  technique.    FINDINGS:     Thoracoabdominal aorta: The aorta is normal in caliber without  dissection. Stents are present in the left anterior descending  coronary artery. No visualized pulmonary embolus.    Chest: 0.3 cm subpleural nodule in the anterolateral aspect of the mid  right lung (series 5, image 51). A few tiny nodules in bilateral lung  bases were also present on 3/22/2016 and therefore are likely of  benign etiology. The lungs are otherwise clear. No pleural or  pericardial effusion. No enlarged lymph nodes in the chest.    Abdomen: The liver, spleen, pancreas, adrenal glands and left kidney  are unremarkable. A 1 cm low-attenuation lesion in the interpolar  region of the right kidney, too small to characterize. The gallbladder  is present. The visualized portions of the small and large bowel are  normal in caliber. The appendix is unremarkable. No enlarged lymph  nodes or free fluid in the upper abdomen. Small to moderate-sized  paraumbilical hernia containing fat.      Impression    IMPRESSION:   1. The aorta is normal in caliber without dissection.  2. No visualized pulmonary embolus.  3. No evidence of active pulmonary disease.  4. Tiny indeterminate right lung nodule. This is likely a benign  nodule. Recommend comparison with  prior imaging studies, if available.  If not available and the patient is a smoker or has other significant  risk factors, a followup CT scan could be considered in 12 months to  confirm stability.    NICK BRISCOE MD   CBC with platelets differential   Result Value Ref Range    WBC 8.7 4.0 - 11.0 10e9/L    RBC Count 4.42 4.4 - 5.9 10e12/L    Hemoglobin 12.5 (L) 13.3 - 17.7 g/dL    Hematocrit 39.1 (L) 40.0 - 53.0 %    MCV 89 78 - 100 fl    MCH 28.3 26.5 - 33.0 pg    MCHC 32.0 31.5 - 36.5 g/dL    RDW 12.9 10.0 - 15.0 %    Platelet Count 192 150 - 450 10e9/L    Diff Method Automated Method     % Neutrophils 62.2 %    % Lymphocytes 28.8 %    % Monocytes 7.0 %    % Eosinophils 1.3 %    % Basophils 0.2 %    % Immature Granulocytes 0.5 %    Absolute Neutrophil 5.4 1.6 - 8.3 10e9/L    Absolute Lymphocytes 2.5 0.8 - 5.3 10e9/L    Absolute Monocytes 0.6 0.0 - 1.3 10e9/L    Absolute Eosinophils 0.1 0.0 - 0.7 10e9/L    Absolute Basophils 0.0 0.0 - 0.2 10e9/L    Abs Immature Granulocytes 0.0 0 - 0.4 10e9/L   D dimer quantitative   Result Value Ref Range    D Dimer 0.3 0.0 - 0.50 ug/ml FEU   Comprehensive metabolic panel   Result Value Ref Range    Sodium 144 133 - 144 mmol/L    Potassium 4.2 3.4 - 5.3 mmol/L    Chloride 108 94 - 109 mmol/L    Carbon Dioxide 29 20 - 32 mmol/L    Anion Gap 7 3 - 14 mmol/L    Glucose 117 (H) 70 - 99 mg/dL    Urea Nitrogen 28 7 - 30 mg/dL    Creatinine 1.04 0.66 - 1.25 mg/dL    GFR Estimate 76 >60 mL/min/1.7m2    GFR Estimate If Black >90   GFR Calc   >60 mL/min/1.7m2    Calcium 8.2 (L) 8.5 - 10.1 mg/dL    Bilirubin Total 0.2 0.2 - 1.3 mg/dL    Albumin 3.9 3.4 - 5.0 g/dL    Protein Total 6.8 6.8 - 8.8 g/dL    Alkaline Phosphatase 70 40 - 150 U/L    ALT 31 0 - 70 U/L    AST 18 0 - 45 U/L   Lipase   Result Value Ref Range    Lipase 240 73 - 393 U/L   Troponin I   Result Value Ref Range    Troponin I ES  0.000 - 0.045 ug/L     <0.015  The 99th percentile for upper reference range is  "0.045 ug/L.  Troponin values in   the range of 0.045 - 0.120 ug/L may be associated with risks of adverse   clinical events.     Blood gas venous   Result Value Ref Range    Ph Venous 7.37 7.32 - 7.43 pH    PCO2 Venous 49 40 - 50 mm Hg    PO2 Venous 27 25 - 47 mm Hg    Bicarbonate Venous 28 21 - 28 mmol/L    Base Excess Venous 2.6 mmol/L    FIO2 21    Nt probnp inpatient (BNP)   Result Value Ref Range    N-Terminal Pro BNP Inpatient 40 0 - 450 pg/mL   CRP inflammation   Result Value Ref Range    CRP Inflammation 5.2 0.0 - 8.0 mg/L   Troponin I   Result Value Ref Range    Troponin I ES  0.000 - 0.045 ug/L     <0.015  The 99th percentile for upper reference range is 0.045 ug/L.  Troponin values in   the range of 0.045 - 0.120 ug/L may be associated with risks of adverse   clinical events.               Labs Ordered and Resulted from Time of ED Arrival Up to the Time of Departure from the ED   CBC WITH PLATELETS DIFFERENTIAL - Abnormal; Notable for the following:        Result Value    Hemoglobin 12.5 (*)     Hematocrit 39.1 (*)     All other components within normal limits   COMPREHENSIVE METABOLIC PANEL - Abnormal; Notable for the following:     Glucose 117 (*)     Calcium 8.2 (*)     All other components within normal limits   D DIMER QUANTITATIVE   LIPASE   TROPONIN I   BLOOD GAS VENOUS   NT PROBNP INPATIENT   CRP INFLAMMATION   TROPONIN I   PERIPHERAL IV CATHETER   PULSE OXIMETRY NURSING   CARDIAC CONTINUOUS MONITORING       Assessments & Plan (with Medical Decision Making)     Chest pain, unspecified type  Anxiety  Bilateral lower extremity pain     46 year old male with a history of multivessel coronary artery disease treated with stenting procedure 2/2017 presents for evaluation of chest pain starting 3 hours prior to arrival. He woke up feeling lethargic and developed chest pain later on in the day. Chest pain occurred at rest. He describes it as a crushing weight on his chest that is \"building\". Associates " symptoms of nausea, and dyspnea.  Nitroglycerin at home x 4 did not help with his symptoms. He underwent stress thallium testing due to similar chest pain on 4/12/17 which was within normal limits.  On exam he has normal vital signs.  He appears anxious and is clutching his chest.  Exam is otherwise within normal limits.  Initial EKG was normal.  Labs returned within normal limits.  Troponin was undetectable.  D dimer was negative.  Other labs normal as well.  CXR without acute abnormalities. Given his description of chest pain and lack of improvement with nitroglycerine, I felt that we needed to rule out aortic dissection.  Therefore, we ordered a CT of the chest which returned normal as well.  The patient was given aspirin, ativan IV, nitropaste, fentanyl, and dilaudid.  After the dosing of dilaudid and a repeat dose of ativan, his symptoms did improve.  Repeat EKG and 3 hour follow up troponin returned completely normal as well.  I spoke with Dr. Alvarez, Inpatient Hospitalist, regarding a possible observation stay for his chest pain.  Dr. Alvarez reviewed his chart in great detail and felt that Cecil's chest pain is non-cardiac in origin.   The patient's vital signs continued to be normal during his full evaluation.  Dr. Alvarez came down to the ED and had a long discussion with the patient as well regarding his situation.  The patient felt much better after this conversation.  He does report that his chest pain has improved significantly during his ED stay with the above noted medication regimen.  The patient plans to speak with his PCP, Dr. Cole, regarding a PRN anxiety medication to see if treating the anxiety earlier on in the escalation of the anxiety would help.  He would rather discuss this in more detail with his PCP.   Apparently, the patient has a follow-up appointment with his PCP the end of this week. I will give him #6 tabs of 5 mg Valium to use as needed for breakthrough anxiety symptoms if  they occur until that time. He was concerned about the lower extremity discomfort that he has. He has run out of the oxycodone that he uses on an as needed basis. I did give him #6 tablets of 5 mg oxycodone to use as well. This is a prescription that he received from his PCP in the past.    On a side note, the patient notes significant lower extremity weakness, pain, and inability to exercise ever since starting the new medication regimen after his stenting procedure.  We are concerned that he could be experiencing statin side effects.  He will discuss this with his cardiologist.        I have reviewed the nursing notes.    I have reviewed the findings, diagnosis, plan and need for follow up with the patient.    New Prescriptions    DIAZEPAM (VALIUM) 5 MG TABLET    Take 1 tablet (5 mg) by mouth every 12 hours as needed for anxiety or agitation    OXYCODONE (ROXICODONE) 5 MG IR TABLET    Take 1 tablet (5 mg) by mouth every 6 hours as needed for pain       Final diagnoses:   Chest pain, unspecified type   Anxiety   Bilateral lower extremity pain       Disclaimer: This note consists of symbols derived from keyboarding, dictation and/or voice recognition software. As a result, there may be errors in the script that have gone undetected. Please consider this when interpreting information found in this chart.      6/4/2017  Guillermo Pennington PA-C    [facility] EMERGENCY DEPARTMENT     Guillermo Pennington PA-C  06/05/17 0306

## 2017-06-05 NOTE — ED NOTES
"Pt starting to feel relief of the chest pressure, currently 4/10 on the pain scale.  Administered a second dose of Dilaudid.  Pt states that the Nitro paste fell off and he removed it stating that \"it was all gone anyway.\"  "

## 2017-06-05 NOTE — ED NOTES
Bedside report completed with Celeste SOLANO.  Assumed cares at 0130.  Pt up to the bathroom.  Pt complaining of chest pressure, pt states that it is unchanged since arrival.  Spoke with provider.  Orders received.  Administered medication to pt.  Pt remains on cardiac monitor, blood pressure, and oxymetry.  Within 3 minutes of administration pt states that he has had some relief in the pressure in his chest.

## 2017-06-13 ENCOUNTER — OFFICE VISIT (OUTPATIENT)
Dept: FAMILY MEDICINE | Facility: CLINIC | Age: 47
End: 2017-06-13
Payer: COMMERCIAL

## 2017-06-13 VITALS
WEIGHT: 247.7 LBS | BODY MASS INDEX: 36.58 KG/M2 | HEART RATE: 111 BPM | OXYGEN SATURATION: 98 % | DIASTOLIC BLOOD PRESSURE: 88 MMHG | RESPIRATION RATE: 20 BRPM | TEMPERATURE: 97.1 F | SYSTOLIC BLOOD PRESSURE: 136 MMHG

## 2017-06-13 DIAGNOSIS — G62.9 NEUROPATHY: ICD-10-CM

## 2017-06-13 DIAGNOSIS — G89.4 CHRONIC PAIN SYNDROME: ICD-10-CM

## 2017-06-13 DIAGNOSIS — G50.0 TRIGEMINAL NEURALGIA: ICD-10-CM

## 2017-06-13 DIAGNOSIS — I25.110 CORONARY ARTERY DISEASE INVOLVING NATIVE CORONARY ARTERY OF NATIVE HEART WITH UNSTABLE ANGINA PECTORIS (H): ICD-10-CM

## 2017-06-13 DIAGNOSIS — M79.10 MYALGIA: Primary | ICD-10-CM

## 2017-06-13 PROCEDURE — 99214 OFFICE O/P EST MOD 30 MIN: CPT | Performed by: FAMILY MEDICINE

## 2017-06-13 RX ORDER — UBIDECARENONE 200 MG
1 CAPSULE ORAL DAILY
Qty: 30 CAPSULE | Refills: 1 | Status: SHIPPED | OUTPATIENT
Start: 2017-06-13 | End: 2017-07-20

## 2017-06-13 RX ORDER — NITROGLYCERIN 0.4 MG/1
TABLET SUBLINGUAL
Qty: 25 TABLET | Refills: 3 | Status: SHIPPED | OUTPATIENT
Start: 2017-06-13 | End: 2017-08-09

## 2017-06-13 RX ORDER — OXYCODONE HYDROCHLORIDE 5 MG/1
5 TABLET ORAL EVERY 6 HOURS PRN
Qty: 40 TABLET | Refills: 0 | Status: SHIPPED | OUTPATIENT
Start: 2017-06-13 | End: 2017-07-14

## 2017-06-13 RX ORDER — TRAMADOL HYDROCHLORIDE 50 MG/1
50-100 TABLET ORAL 3 TIMES DAILY PRN
Qty: 180 TABLET | Refills: 0 | Status: SHIPPED | OUTPATIENT
Start: 2017-06-13 | End: 2017-07-14

## 2017-06-13 ASSESSMENT — PAIN SCALES - GENERAL: PAINLEVEL: SEVERE PAIN (6)

## 2017-06-13 NOTE — PROGRESS NOTES
"  SUBJECTIVE:                                                    Cecil Vasquez is a 46 year old male who presents to clinic today for the following health issues:    Chief Complaint   Patient presents with     ER F/U        ED/UC Followup:    Facility:  [facility]  Date of visit: 06/04/2017  Reason for visit: chest pain, leg pain  Current Status: states that he leg pain is still there, but the chest pain is gone.      Returns to clinic for recheck. He was seen in the ER recently for chest pain. Did not feel like his prior episodes of angina. He does have known ASCVD. This seems to come on quite suddenly and feels like a pressure. He has been treated in the ER for \"anxiety\". His chest pressure seems to get better with Ativan. He was originally evaluated several months ago with a repeat nuclear test which was unremarkable. He has a follow-up tomorrow with cardiology. He has had no recurrence of his pain. He does have pain in his bilateral calf that started after his stenting procedure and initiation of statin. He is wondering if this is related to the Crestor. He has heard about \"Q10 and would like to try this.    When he was in the ER, the note from the physician said that he was \"out of his Percocet\". He was given a small refill. He tells me that in fact he had 10 or 20 left over. If he was out, this would have been far too soon. #70 pills were supposed to last 30 days, but only lasted a couple weeks. We discussed his pain. We discussed his propensity for his anxiety to be somewhat overwhelming, and for him to take medication to compensate for that. There is certainly a component of anxiety to his pain. He agrees. He agrees to start reducing his oxycodone.    ROS:  Constitutional, HEENT, cardiovascular, pulmonary, gi and gu systems are negative, except as otherwise noted.    OBJECTIVE:                                                    /88 (BP Location: Right arm, Patient Position: Chair, Cuff Size: Adult " Regular)  Pulse 111  Temp 97.1  F (36.2  C) (Temporal)  Resp 20  Wt 247 lb 11.2 oz (112.4 kg)  SpO2 98%  BMI 36.58 kg/m2  Body mass index is 36.58 kg/(m^2).    No exam    Diagnostic Test Results:  none      ASSESSMENT/PLAN:                                                        ICD-10-CM    1. Myalgia M79.1 coenzyme Q-10 (CO Q10 MAXIMUM STRENGTH) 200 MG CAPS   2. Neuropathy (H) G62.9 oxyCODONE (ROXICODONE) 5 MG IR tablet     traMADol (ULTRAM) 50 MG tablet   3. Coronary artery disease involving native coronary artery of native heart with unstable angina pectoris (H) I25.110 nitroglycerin (NITROSTAT) 0.4 MG sublingual tablet    s/p PHYLLIS to LAD 2/21/2017   4. 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 The increasing gabapentin right now is helping. He wants to reduce his oxycodone down to #40 a month. He may increase his tramadol to 2 tabs 3 times daily. If he has worsening pain, he will use other nonpharmacological means to manage his anxiety, such as calling his support group. For his myalgias, he may try co-every 10, although I don't think his story fits truly was statin-induced myalgia. too localized. See him back in a month.    Gonzalo Cole MD  [facility]

## 2017-06-13 NOTE — NURSING NOTE
"Chief Complaint   Patient presents with     ER F/U       Initial /88 (BP Location: Right arm, Patient Position: Chair, Cuff Size: Adult Regular)  Pulse 111  Temp 97.1  F (36.2  C) (Temporal)  Resp 20  Wt 247 lb 11.2 oz (112.4 kg)  SpO2 98%  BMI 36.58 kg/m2 Estimated body mass index is 36.58 kg/(m^2) as calculated from the following:    Height as of 4/11/17: 5' 9\" (1.753 m).    Weight as of this encounter: 247 lb 11.2 oz (112.4 kg).  Medication Reconciliation: complete   Luiza Austin CMA     "

## 2017-06-13 NOTE — MR AVS SNAPSHOT
After Visit Summary   6/13/2017    Cecil Vasquez    MRN: [id number]           Patient Information     Date Of Birth          1970        Visit Information        Provider Department      6/13/2017 11:00 AM Gonzalo Cole MD [facility]        Today's Diagnoses     Myalgia    -  1    Neuropathy (H)        Coronary artery disease involving native coronary artery of native heart with unstable angina pectoris (H)        Chronic pain syndrome           Follow-ups after your visit        Your next 10 appointments already scheduled     Balta 15, 2017 10:00 AM CDT   [facility] Visit with Rose Durham PA-C   [facility]    [address] [place] [id number] [phone]              Who to contact     If you have questions or need follow up information about today's clinic visit or your schedule please contact [facility] directly at [phone].  Normal or non-critical lab and imaging results will be communicated to you by MyChart, letter or phone within 4 business days after the clinic has received the results. If you do not hear from us within 7 days, please contact the clinic through MyChart or phone. If you have a critical or abnormal lab result, we will notify you by phone as soon as possible.  Submit refill requests through [employer] or call your pharmacy and they will forward the refill request to us. Please allow 3 business days for your refill to be completed.          Additional Information About Your Visit        Care EveryWhere ID     This is your Care EveryWhere ID. This could be used by other organizations to access your [facility] medical records  [id number]        Your Vitals Were     Pulse Temperature Respirations Pulse Oximetry BMI (Body Mass Index)       111 97.1  F (36.2  C) (Temporal) 20 98% 36.58 kg/m2        Blood Pressure from Last 3 Encounters:   06/13/17 136/88   06/05/17 139/88    05/18/17 130/89    Weight from Last 3 Encounters:   06/13/17 247 lb 11.2 oz (112.4 kg)   06/04/17 249 lb 12.5 oz (113.3 kg)   05/18/17 249 lb 11.2 oz (113.3 kg)              Today, you had the following     No orders found for display         Today's Medication Changes          These changes are accurate as of: 6/13/17  1:34 PM.  If you have any questions, ask your nurse or doctor.               Start taking these medicines.        Dose/Directions    coenzyme Q-10 200 MG Caps   Commonly known as:  CO Q10 MAXIMUM STRENGTH   Used for:  Myalgia   Started by:  Gonzalo Cole MD        Dose:  1 capful   Take 1 capful by mouth daily   Quantity:  30 capsule   Refills:  1         These medicines have changed or have updated prescriptions.        Dose/Directions    traMADol 50 MG tablet   Commonly known as:  ULTRAM   This may have changed:  when to take this   Used for:  Neuropathy (H)   Changed by:  Gonzalo Cole MD        Dose:   mg   Take 1-2 tablets ( mg) by mouth 3 times daily as needed for moderate pain   Quantity:  180 tablet   Refills:  0            Where to get your medicines      These medications [facility] [place] [facility] [address] [address]     Phone:  [phone]     coenzyme Q-10 200 MG Caps    nitroglycerin 0.4 MG sublingual tablet         Some of these will need a paper prescription and others can be bought over the counter.  Ask your nurse if you have questions.     Bring a paper prescription for each of these medications     oxyCODONE 5 MG IR tablet    traMADol 50 MG tablet                Primary Care Provider Office Phone # Fax #    Gonzalo Cole [phone]       [place] [address]  [place] [address]        Thank you!     Thank you for choosing [facility]  for your care. Our goal is always to provide you with excellent care. Hearing back  from our patients is one way we can continue to improve our services. Please take a few minutes to complete the written survey that you may receive in the mail after your visit with us. Thank you!             Your Updated Medication List - Protect others around you: Learn how to safely use, store and throw away your medicines at www.disposemymeds.org.          This list is accurate as of: 6/13/17  1:34 PM.  Always use your most recent med list.                   Brand Name Dispense Instructions for use    aspirin 81 MG EC tablet     90 tablet    Take 1 tablet (81 mg) by mouth daily       coenzyme Q-10 200 MG Caps    CO Q10 MAXIMUM STRENGTH    30 capsule    Take 1 capful by mouth daily       diazepam 5 MG tablet    VALIUM    6 tablet    Take 1 tablet (5 mg) by mouth every 12 hours as needed for anxiety or agitation       ezetimibe 10 MG tablet    ZETIA    90 tablet    Take 1 tablet (10 mg) by mouth daily       gabapentin 600 MG tablet    NEURONTIN    240 tablet    TAKE TWO TABLETS BY MOUTH THREE TIMES A DAY, increase by 1 tab every 2-3 days, until max of 5400 mg daily       ipratropium - albuterol 0.5 mg/2.5 mg/3 mL 0.5-2.5 (3) MG/3ML neb solution    DUONEB    30 vial    Take 1 vial (3 mLs) by nebulization every 4 hours as needed for shortness of breath / dyspnea       lisinopril 10 MG tablet    PRINIVIL/ZESTRIL    90 tablet    Take 1 tablet (10 mg) by mouth daily       MAPAP 325 MG tablet   Generic drug:  acetaminophen     100 tablet    TAKE TWO TABLETS BY MOUTH EVERY 4 HOURS AS NEEDED FOR MILD PAIN       melatonin 3 MG tablet     30 tablet    Take 1 tablet (3 mg) by mouth nightly as needed for sleep       * metoprolol 50 MG 24 hr tablet    TOPROL-XL    90 tablet    Take 1 tablet (50 mg) by mouth daily       * metoprolol 25 MG 24 hr tablet    TOPROL-XL    30 tablet    TAKE ONE TABLET BY MOUTH EVERY DAY       nitroglycerin 0.4 MG sublingual tablet    NITROSTAT    25 tablet    For chest pain place 1 tablet under the  tongue every 5 minutes for 3 doses. If symptoms persist 5 minutes after 1st dose call 911.       oxyCODONE 5 MG IR tablet    ROXICODONE    40 tablet    Take 1 tablet (5 mg) by mouth every 6 hours as needed for pain       prasugrel 10 MG Tabs tablet    EFFIENT    90 tablet    Take 1 tablet (10 mg) by mouth daily       rosuvastatin 40 MG tablet    CRESTOR    90 tablet    Take 1 tablet (40 mg) by mouth daily       traMADol 50 MG tablet    ULTRAM    180 tablet    Take 1-2 tablets ( mg) by mouth 3 times daily as needed for moderate pain       VENTOLIN  (90 BASE) MCG/ACT Inhaler   Generic drug:  albuterol     18 g    INHALE 2 PUFFS INTO THE LUNGS EVERY 4 HOURS AS NEEDED FOR SHORTNESS OF BREATH OR DIFFICULTY BREATHING       * Notice:  This list has 2 medication(s) that are the same as other medications prescribed for you. Read the directions carefully, and ask your doctor or other care provider to review them with you.

## 2017-06-14 NOTE — TELEPHONE ENCOUNTER
melatonin 3 MG tablet      Last Written Prescription Date: 4/20/17  Last Fill Quantity: 30,  # refills: 1   Last Office Visit with [facility], [facility] or [facility] prescribing provider: 6/14/17                                         Next 5 appointments (look out 90 days)     Balta 15, 2017 10:00 AM CDT   [facility] with [facility] Durham [facility]-C   [facility]    [address] [place] [id number] [phone]

## 2017-06-14 NOTE — TELEPHONE ENCOUNTER
[facility] Prior Authorization Team   Phone: [phone]  Fax: [phone]      PA Initiation    Medication: ezetimibe (ZETIA) 10 MG tablet -   Insurance Company: [employer] PMA - Phone [phone] Fax [phone]  Pharmacy Filling the Rx: [address]  Filling Pharmacy Phone: [phone]  Filling Pharmacy Fax:    Start Date: 6/14/2017

## 2017-06-15 NOTE — TELEPHONE ENCOUNTER
Melatonin  Routing refill request to provider for review/approval because:  Drug not on the [facility] refill protocol for associated diagnosis    Sridhar Klein RN, BSN

## 2017-06-16 ENCOUNTER — TELEPHONE (OUTPATIENT)
Dept: FAMILY MEDICINE | Facility: CLINIC | Age: 47
End: 2017-06-16

## 2017-06-16 RX ORDER — LANOLIN ALCOHOL/MO/W.PET/CERES
CREAM (GRAM) TOPICAL
Qty: 30 TABLET | Refills: 1 | Status: SHIPPED | OUTPATIENT
Start: 2017-06-16 | End: 2017-08-15

## 2017-06-16 NOTE — TELEPHONE ENCOUNTER
Patient states taking 3 tablets three times a day on his gabapentin 600mg. Please verify and send new RX with corrected directions and quantity. Thank you.    Angelica Dorsey, Pharmacy Technician  [facility]  [phone]

## 2017-06-27 ENCOUNTER — TELEPHONE (OUTPATIENT)
Dept: CARDIOLOGY | Facility: CLINIC | Age: 47
End: 2017-06-27

## 2017-06-27 DIAGNOSIS — E78.5 HYPERLIPIDEMIA LDL GOAL <70: ICD-10-CM

## 2017-06-27 RX ORDER — EZETIMIBE 10 MG/1
10 TABLET ORAL DAILY
Qty: 90 TABLET | Refills: 3 | Status: SHIPPED | OUTPATIENT
Start: 2017-06-27

## 2017-06-28 NOTE — TELEPHONE ENCOUNTER
Prior Authorization Approval    Authorization Effective Date: 5/31/2017  Authorization Expiration Date: 5/31/2018  Medication: ezetimibe (ZETIA) 10 MG tablet - approved  Approved Dose/Quantity:   Reference #: [id number]   Insurance Company: [employer]AP - Phone [phone] Fax [phone]  Expected CoPay: $0.00     CoPay Card Available:      Foundation Assistance Needed:    Which Pharmacy is filling the prescription (Not needed for infusion/clinic administered): [facility] [address]   Pharmacy Notified: Yes  Patient Notified: Yes

## 2017-07-03 ENCOUNTER — TELEPHONE (OUTPATIENT)
Dept: FAMILY MEDICINE | Facility: CLINIC | Age: 47
End: 2017-07-03

## 2017-07-03 NOTE — TELEPHONE ENCOUNTER
Reason for Call: Request for an order or referral:    Order or referral being requested: Patients girlfriend is requesting a referral to a pain mgmt clinic.  Was advised that Dr Cole is out until 7.7    Date needed: as soon as possible    Has the patient been seen by the PCP for this problem? YES    Additional comments:     Phone number Patient can be reached at:  Home number on file [phone] (home)    Best Time:  any    Can we leave a detailed message on this number?  YES    Call taken on 7/3/2017 at 1:02 PM by Edith Hampton

## 2017-07-07 NOTE — TELEPHONE ENCOUNTER
Called patient and left a voicemail to call the clinic back, when he calls please send him back to Dr. Cole's assistant.     Luiza Austin, CMA

## 2017-07-11 NOTE — TELEPHONE ENCOUNTER
Called patient and he stated that that he already had an appointment for pain management clinic.  No action is needed.     Luiza Austin, CMA

## 2017-07-11 NOTE — TELEPHONE ENCOUNTER
Called patient and left a voicemail to call the clinic back, when he calls back please transfer him back to Dr. Cole's Assistant.     Luiza Austin, CMA

## 2017-07-14 ENCOUNTER — TELEPHONE (OUTPATIENT)
Dept: FAMILY MEDICINE | Facility: CLINIC | Age: 47
End: 2017-07-14

## 2017-07-14 ENCOUNTER — OFFICE VISIT (OUTPATIENT)
Dept: FAMILY MEDICINE | Facility: CLINIC | Age: 47
End: 2017-07-14
Payer: COMMERCIAL

## 2017-07-14 VITALS
WEIGHT: 250.6 LBS | SYSTOLIC BLOOD PRESSURE: 146 MMHG | BODY MASS INDEX: 37.01 KG/M2 | RESPIRATION RATE: 20 BRPM | DIASTOLIC BLOOD PRESSURE: 92 MMHG | HEART RATE: 83 BPM | OXYGEN SATURATION: 98 % | TEMPERATURE: 97.4 F

## 2017-07-14 DIAGNOSIS — G62.9 NEUROPATHY: ICD-10-CM

## 2017-07-14 DIAGNOSIS — M79.661 PAIN IN BOTH LOWER LEGS: Primary | ICD-10-CM

## 2017-07-14 DIAGNOSIS — I24.9 ACS (ACUTE CORONARY SYNDROME) (H): ICD-10-CM

## 2017-07-14 DIAGNOSIS — G89.4 CHRONIC PAIN SYNDROME: ICD-10-CM

## 2017-07-14 DIAGNOSIS — M79.662 PAIN IN BOTH LOWER LEGS: Primary | ICD-10-CM

## 2017-07-14 LAB
ANION GAP SERPL CALCULATED.3IONS-SCNC: 12 MMOL/L (ref 3–14)
BUN SERPL-MCNC: 20 MG/DL (ref 7–30)
CALCIUM SERPL-MCNC: 9.1 MG/DL (ref 8.5–10.1)
CHLORIDE SERPL-SCNC: 106 MMOL/L (ref 94–109)
CK SERPL-CCNC: 341 U/L (ref 30–300)
CO2 SERPL-SCNC: 23 MMOL/L (ref 20–32)
CREAT SERPL-MCNC: 0.81 MG/DL (ref 0.66–1.25)
GFR SERPL CREATININE-BSD FRML MDRD: ABNORMAL ML/MIN/1.7M2
GLUCOSE SERPL-MCNC: 141 MG/DL (ref 70–99)
POTASSIUM SERPL-SCNC: 4.1 MMOL/L (ref 3.4–5.3)
SODIUM SERPL-SCNC: 141 MMOL/L (ref 133–144)

## 2017-07-14 PROCEDURE — 82550 ASSAY OF CK (CPK): CPT | Performed by: FAMILY MEDICINE

## 2017-07-14 PROCEDURE — 99214 OFFICE O/P EST MOD 30 MIN: CPT | Performed by: FAMILY MEDICINE

## 2017-07-14 PROCEDURE — 80048 BASIC METABOLIC PNL TOTAL CA: CPT | Performed by: FAMILY MEDICINE

## 2017-07-14 PROCEDURE — 36415 COLL VENOUS BLD VENIPUNCTURE: CPT | Performed by: FAMILY MEDICINE

## 2017-07-14 RX ORDER — TRAMADOL HYDROCHLORIDE 50 MG/1
50-100 TABLET ORAL 3 TIMES DAILY PRN
Qty: 180 TABLET | Refills: 0 | Status: SHIPPED | OUTPATIENT
Start: 2017-07-14 | End: 2017-08-14

## 2017-07-14 RX ORDER — OXYCODONE HYDROCHLORIDE 5 MG/1
5 TABLET ORAL EVERY 6 HOURS PRN
Qty: 60 TABLET | Refills: 0 | Status: SHIPPED | OUTPATIENT
Start: 2017-07-14 | End: 2017-08-01

## 2017-07-14 ASSESSMENT — PAIN SCALES - GENERAL: PAINLEVEL: SEVERE PAIN (6)

## 2017-07-14 NOTE — NURSING NOTE
"Chief Complaint   Patient presents with     RECHECK     all meds       Initial BP (!) 146/92 (BP Location: Left arm, Patient Position: Chair, Cuff Size: Adult Regular)  Pulse 83  Temp 97.4  F (36.3  C) (Temporal)  Resp 20  Wt 250 lb 9.6 oz (113.7 kg)  SpO2 98%  BMI 37.01 kg/m2 Estimated body mass index is 37.01 kg/(m^2) as calculated from the following:    Height as of 4/11/17: 5' 9\" (1.753 m).    Weight as of this encounter: 250 lb 9.6 oz (113.7 kg).  Medication Reconciliation: complete   Luiza Austin CMA     "

## 2017-07-14 NOTE — PROGRESS NOTES
SUBJECTIVE:                                                    Cecil Vasquez is a 46 year old male who presents to clinic today for the following health issues:    Chief Complaint   Patient presents with     RECHECK     all meds        Chronic Pain Follow-Up       Type / Location of Pain: Head and both legs  Analgesia/pain control:       Recent changes:  worse      Overall control: Inadequate pain control  Activity level/function:      Daily activities:  Can do most things most days, with some rest    Work:  Able to work part time without limitations  Adverse effects:  No  Adherance    Taking medication as directed?  Yes    Participating in other treatments: no - will go to pain clinic on 07/20/2017  Risk Factors:    Sleep:  Fair    Mood/anxiety:  controlled    Recent family or social stressors:  none noted    Other aggravating factors: none  PHQ-9 SCORE 11/17/2016 12/16/2016 7/14/2017   Total Score - - -   Total Score 1 5 0     LAUREN-7 SCORE 2/9/2015 3/24/2015 12/16/2016   Total Score 0 2 -   Total Score - - 0     Encounter-Level CSA:     There are no encounter-level csa.          Amount of exercise or physical activity: 2-3 days/week for an average of 15-30 minutes    Problems taking medications regularly: No    Medication side effects: none    Diet: regular (no restrictions)    Patient returns to clinic. He has right-sided facial neuropathy. Also has developed right and left nonexertional lower extremity pain. No low back pain. Pain is in his calves bilaterally. Seems to be worse in the morning, sometimes wakes him from sleep. Again, does not sound exertional. Taking Percocet for this has helped.    ROS:  Constitutional, HEENT, cardiovascular, pulmonary, gi and gu systems are negative, except as otherwise noted.      OBJECTIVE:                                                    BP (!) 146/92 (BP Location: Left arm, Patient Position: Chair, Cuff Size: Adult Regular)  Pulse 83  Temp 97.4  F (36.3  C)  (Temporal)  Resp 20  Wt 250 lb 9.6 oz (113.7 kg)  SpO2 98%  BMI 37.01 kg/m2  Body mass index is 37.01 kg/(m^2).    Anxious but well-appearing. Heart regular. Lungs clear unlabored. Extremities without edema. His calves are nontender and normal in appearance. He has normal pedal pulses which are full and intact. Skin is warm. No ulcerations.    Diagnostic Test Results:  none      ASSESSMENT/PLAN:                                                        ICD-10-CM    1. Pain in both lower legs M79.661 CK total    M79.662 Basic metabolic panel     US KARMA Doppler No Exercise   2. Neuropathy (H) G62.9 oxyCODONE (ROXICODONE) 5 MG IR tablet     traMADol (ULTRAM) 50 MG tablet   3. ACS (acute coronary syndrome) (H) I24.9    4. Chronic pain syndrome G89.4      He has a deep ache in his bilateral lower extremities it is nonexertional. Given his history of early cardiovascular disease, will evaluate his arteries with an KARMA. Could also be statin related, it seemed to have started after initiation of his arm. He will take a break from this. I don't think: Thank you is been shown to be helpful. He was reminded that he may not expand his use of oxycodone, this is only for facial pain. He is also on daytime tramadol. His use has escalated of oxycodone. Unless he is willing to start weaning back, he needs to seek pain clinic consultation.. He is beginning to get beyond the daily morphine units that I'm comfortable with. He agrees. Also, peripheral arterial disease and statin myopathy is not something I use narcotics to treat.    Gonzalo Cole MD  [facility]

## 2017-07-14 NOTE — MR AVS SNAPSHOT
After Visit Summary   7/14/2017    Cecil Vasquez    MRN: [id number]           Patient Information     Date Of Birth          1970        Visit Information        Provider Department      7/14/2017 8:40 AM Gonzalo Cole MD [facility]        Today's Diagnoses     Pain in both lower legs    -  1    Neuropathy (H)        ACS (acute coronary syndrome) (H)        Chronic pain syndrome           Follow-ups after your visit        Your next 10 appointments already scheduled     Jul 20, 2017 12:40 PM CDT   Return Visit with Rose Durham PA-C   [facility]    [address] [place] [id number] [phone]            Jul 27, 2017 10:30 AM CDT   US KARMA DOPPLER NO EXERCISE with PHUS3   [facility] ([facility])    [address] [place] [id number] [phone]           Please bring a list of your medicines (including vitamins, minerals and over-the-counter drugs). Also, tell your doctor about any allergies you may have. Wear comfortable clothes and leave your valuables at home.  No caffeine or tobacco for 1 hour prior to exam.  Please call the Imaging Department at your exam site with any questions.            Jul 28, 2017 10:40 AM CDT   SHORT with Gonzalo Cole MD   [facility] ([address] [phone]              Who to contact     If you have questions or need follow up information about today's clinic visit or your schedule please contact [facility] directly at [phone].  Normal or non-critical lab and imaging results will be communicated to you by MyChart, letter or phone within 4 business days after the clinic has received the results. If you do not hear from us within 7 days, please contact the clinic through MyChart or phone. If you have a critical or  "abnormal lab result, we will notify you by phone as soon as possible.  Submit refill requests through [employer] or call your pharmacy and they will forward the refill request to us. Please allow 3 business days for your refill to be completed.          Additional Information About Your Visit        Clinical [employer] Information     [employer] lets you send messages to your doctor, view your test results, renew your prescriptions, schedule appointments and more. To sign up, go to [email or web address] . Click on \"Log in\" on the left side of the screen, which will take you to the Welcome page. Then click on \"Sign up Now\" on the right side of the page.     You will be asked to enter the access code listed below, as well as some personal information. Please follow the directions to create your username and password.     Your access code is: [email or web address]  Expires: 10/18/2017 11:03 AM     Your access code will  in 90 days. If you need help or a new code, please call your [facility] clinic or [phone].        Care EveryWhere ID     This is your Care EveryWhere ID. This could be used by other organizations to access your [facility] medical records  [id number]        Your Vitals Were     Pulse Temperature Respirations Pulse Oximetry BMI (Body Mass Index)       83 97.4  F (36.3  C) (Temporal) 20 98% 37.01 kg/m2        Blood Pressure from Last 3 Encounters:   17 (!) 146/92   17 136/88   17 139/88    Weight from Last 3 Encounters:   17 250 lb 9.6 oz (113.7 kg)   17 247 lb 11.2 oz (112.4 kg)   17 249 lb 12.5 oz (113.3 kg)              We Performed the Following     Basic metabolic panel     CK total          Where to get your medicines      Some of these will need a paper prescription and others can be bought over the counter.  Ask your nurse if you have questions.     Bring a paper prescription for each of these medications     oxyCODONE 5 MG IR tablet    traMADol 50 MG tablet          Primary " Care Provider Office Phone # Fax #    Gonzalo Cole [phone]       [facility] [address] [place] [address] [address] [place] [address]        Equal Access to Services     KRISTIE PRINCE : Hadii jeronimo ku haddimaso Soomaali, waaxda luqadaha, qaybta kaalmada adeegyada, david vitaln bryan tellez laSanjayjulien reynoso. So [facility] [phone].    ATENCIÓN: Si habla español, tiene a maynard disposición servicios gratuitos de asistencia lingüística. Llame al [phone].    We comply with applicable federal civil rights laws and [place] laws. We do not discriminate on the basis of race, color, national origin, age, disability sex, sexual orientation or gender identity.            Thank you!     Thank you for choosing [facility]  for your care. Our goal is always to provide you with excellent care. Hearing back from our patients is one way we can continue to improve our services. Please take a few minutes to complete the written survey that you may receive in the mail after your visit with us. Thank you!             Your Updated Medication List - Protect others around you: Learn how to safely use, store and throw away your medicines at www.disposemymeds.org.          This list is accurate as of: 7/14/17 11:59 PM.  Always use your most recent med list.                   Brand Name Dispense Instructions for use Diagnosis    aspirin 81 MG EC tablet     90 tablet    Take 1 tablet (81 mg) by mouth daily    Coronary artery disease involving native coronary artery of native heart with unstable angina pectoris (H)       coenzyme Q-10 200 MG Caps    CO Q10 MAXIMUM STRENGTH    30 capsule    Take 1 capful by mouth daily    Myalgia       diazepam 5 MG tablet    VALIUM    6 tablet    Take 1 tablet (5 mg) by mouth every 12 hours as needed for anxiety or agitation        ezetimibe 10 MG tablet    ZETIA    90 tablet    Take 1 tablet (10 mg) by mouth daily    Hyperlipidemia LDL goal <70       gabapentin 600 MG tablet     NEURONTIN    240 tablet    Take 3 tablets (1,800 mg) by mouth 3 times daily    Neuropathy (H)       ipratropium - albuterol 0.5 mg/2.5 mg/3 mL 0.5-2.5 (3) MG/3ML neb solution    DUONEB    30 vial    Take 1 vial (3 mLs) by nebulization every 4 hours as needed for shortness of breath / dyspnea    Asthma with acute exacerbation, unspecified asthma severity       lisinopril 10 MG tablet    PRINIVIL/ZESTRIL    90 tablet    Take 1 tablet (10 mg) by mouth daily    Essential hypertension with goal blood pressure less than 140/90, ACS (acute coronary syndrome) (H)       MAPAP 325 MG tablet   Generic drug:  acetaminophen     100 tablet    TAKE TWO TABLETS BY MOUTH EVERY 4 HOURS AS NEEDED FOR MILD PAIN    Trigeminal neuralgia       melatonin 3 MG tablet     30 tablet    TAKE ONE TABLET BY MOUTH NIGHTLY AS NEEDED FOR SLEEP    Trigeminal neuralgia       * metoprolol 50 MG 24 hr tablet    TOPROL-XL    90 tablet    Take 1 tablet (50 mg) by mouth daily    Coronary artery disease involving native coronary artery of native heart with unstable angina pectoris (H)       * metoprolol 25 MG 24 hr tablet    TOPROL-XL    30 tablet    TAKE ONE TABLET BY MOUTH EVERY DAY    Essential hypertension with goal blood pressure less than 140/90, Coronary artery disease involving native coronary artery of native heart with unstable angina pectoris (H)       nitroGLYcerin 0.4 MG sublingual tablet    NITROSTAT    25 tablet    For chest pain place 1 tablet under the tongue every 5 minutes for 3 doses. If symptoms persist 5 minutes after 1st dose call 911.    Coronary artery disease involving native coronary artery of native heart with unstable angina pectoris (H)       oxyCODONE 5 MG IR tablet    ROXICODONE    60 tablet    Take 1 tablet (5 mg) by mouth every 6 hours as needed for pain    Neuropathy (H)       prasugrel 10 MG Tabs tablet    EFFIENT    90 tablet    Take 1 tablet (10 mg) by mouth daily    ACS (acute coronary syndrome) (H), Coronary  artery disease involving native coronary artery of native heart with unstable angina pectoris (H)       rosuvastatin 40 MG tablet    CRESTOR    90 tablet    Take 1 tablet (40 mg) by mouth daily    ACS (acute coronary syndrome) (H)       traMADol 50 MG tablet    ULTRAM    180 tablet    Take 1-2 tablets ( mg) by mouth 3 times daily as needed for moderate pain    Neuropathy (H)       VENTOLIN  (90 BASE) MCG/ACT Inhaler   Generic drug:  albuterol     18 g    INHALE 2 PUFFS INTO THE LUNGS EVERY 4 HOURS AS NEEDED FOR SHORTNESS OF BREATH OR DIFFICULTY BREATHING    Asthma with acute exacerbation, unspecified asthma severity       * Notice:  This list has 2 medication(s) that are the same as other medications prescribed for you. Read the directions carefully, and ask your doctor or other care provider to review them with you.

## 2017-07-14 NOTE — TELEPHONE ENCOUNTER
Message left of his US being scheduled on 7/27 at 10:30 am, check in at 10:15. Informed of no caffeine products or tobacco products 1 hour before test. Number left to return call if needs to reschedule, informed these are only done on Thursdays, and that they are not available next week, so that is why we are having to schedule on the 27th. Appointment letter also being mailed with all the needed information for the appointment. Any questions to call the clinic back. Na Murillo LPN

## 2017-07-15 ASSESSMENT — PATIENT HEALTH QUESTIONNAIRE - PHQ9: SUM OF ALL RESPONSES TO PHQ QUESTIONS 1-9: 0

## 2017-07-15 ASSESSMENT — ASTHMA QUESTIONNAIRES: ACT_TOTALSCORE: 23

## 2017-07-20 ENCOUNTER — HOSPITAL ENCOUNTER (OUTPATIENT)
Dept: LAB | Facility: CLINIC | Age: 47
Discharge: HOME | End: 2017-07-20
Attending: PHYSICIAN ASSISTANT | Admitting: PHYSICIAN ASSISTANT
Payer: COMMERCIAL

## 2017-07-20 ENCOUNTER — OFFICE VISIT (OUTPATIENT)
Dept: CARDIOLOGY | Facility: CLINIC | Age: 47
End: 2017-07-20
Payer: COMMERCIAL

## 2017-07-20 VITALS
SYSTOLIC BLOOD PRESSURE: 126 MMHG | WEIGHT: 251 LBS | HEART RATE: 72 BPM | DIASTOLIC BLOOD PRESSURE: 90 MMHG | OXYGEN SATURATION: 94 % | BODY MASS INDEX: 37.18 KG/M2 | HEIGHT: 69 IN

## 2017-07-20 DIAGNOSIS — R73.9 BLOOD GLUCOSE ELEVATED: Primary | ICD-10-CM

## 2017-07-20 DIAGNOSIS — M79.661 BILATERAL CALF PAIN: Primary | ICD-10-CM

## 2017-07-20 DIAGNOSIS — I10 ESSENTIAL HYPERTENSION WITH GOAL BLOOD PRESSURE LESS THAN 140/90: ICD-10-CM

## 2017-07-20 DIAGNOSIS — I10 BENIGN ESSENTIAL HTN: ICD-10-CM

## 2017-07-20 DIAGNOSIS — M79.662 BILATERAL CALF PAIN: Primary | ICD-10-CM

## 2017-07-20 LAB
ANION GAP SERPL CALCULATED.3IONS-SCNC: 11 MMOL/L (ref 3–14)
BUN SERPL-MCNC: 23 MG/DL (ref 7–30)
CALCIUM SERPL-MCNC: 8.5 MG/DL (ref 8.5–10.1)
CHLORIDE SERPL-SCNC: 102 MMOL/L (ref 94–109)
CO2 SERPL-SCNC: 24 MMOL/L (ref 20–32)
CREAT SERPL-MCNC: 0.86 MG/DL (ref 0.66–1.25)
GFR SERPL CREATININE-BSD FRML MDRD: ABNORMAL ML/MIN/1.7M2
GLUCOSE SERPL-MCNC: 141 MG/DL (ref 70–99)
HBA1C MFR BLD: 6 % (ref 4.3–6)
POTASSIUM SERPL-SCNC: 4.2 MMOL/L (ref 3.4–5.3)
SODIUM SERPL-SCNC: 137 MMOL/L (ref 133–144)

## 2017-07-20 PROCEDURE — 80048 BASIC METABOLIC PNL TOTAL CA: CPT | Performed by: PHYSICIAN ASSISTANT

## 2017-07-20 PROCEDURE — 36415 COLL VENOUS BLD VENIPUNCTURE: CPT | Performed by: PHYSICIAN ASSISTANT

## 2017-07-20 PROCEDURE — 83036 HEMOGLOBIN GLYCOSYLATED A1C: CPT | Performed by: PHYSICIAN ASSISTANT

## 2017-07-20 PROCEDURE — 99214 OFFICE O/P EST MOD 30 MIN: CPT | Performed by: PHYSICIAN ASSISTANT

## 2017-07-20 RX ORDER — AMLODIPINE BESYLATE 5 MG/1
5 TABLET ORAL DAILY
Qty: 30 TABLET | Refills: 11 | Status: ON HOLD | OUTPATIENT
Start: 2017-07-20 | End: 2019-10-16

## 2017-07-20 NOTE — LETTER
7/20/2017      RE: Cecil Vasquez  [address]   [facility] [address]       Dear Colleague,    Thank you for the opportunity to participate in the care of your patient, Cecil Vasquez, at the [facility] acute [facility]. Please see a copy of my visit note below.    PRIMARY CARDIOLOGIST:  Amish Wallace MD      REASON FOR VISIT:  Coronary artery disease, drug-eluting stent followup.      HISTORY OF PRESENT ILLNESS:  Mr. Vasquez is a delightful 46-year-old gentleman who had past medical history significant for asthma, untreated hypertension, untreated dyslipidemia, mild persistent asthma and trigeminal neuralgia treated with pain medications, who had developed multiple episodes of substernal chest pain and presented to the ER on 3 occasions in early February.  He eventually was sent for a treadmill stress test that had EKG changes and was associated with chest pain and then went on to have a positive stress echo.  He was seen by Dr. Wallace and sent to [facility] for angiogram.  There he was found to have a 99% proximal LAD lesion and underwent drug-eluting stent.  He was initially started on Brilinta, but due to insurance cost this was switched over to Effient.  He has a remaining 60% RCA lesion in the distal portion.  The remainder of the coronary tree has less disease.        Unfortunately, since I last saw him in March he's had 2 ER visits for chest pain. There are fairly significant with pain over 6 hours at 1.3 hours at the other. He was ruled out for an MI. During his first hospitalization he did undergo a nuclear stress test. He went 9 minutes on the treadmill and achieved 10.4 metastases. She normal EF and no EKG changes. There is no infarct or ischemia noted, perfusion was normal.    He comes in today still having briefer episodes of chest pain that are sometimes relieved with nitroglycerin but most concerned with leg pain. He endorses bilateral  "calf pain. These are not cramping pains just a gnawing pain that can go on all day. This typically they're when he wakes up somewhat relieved with walking but otherwise continues. If he takes tramadol this does reduce the pain. He tried coenzyme Q10 this did not make any change in his leg pain, he's been off his Crestor and Czerniak for 2 weeks and this did not make a change either. He denies that his symptoms worsen with exercise. He has not had any sores on his feet. He also feels bloated and fatigue. He's noted in spite of changing his diet he's gained about 8 pounds this summer. He's been fairly depressed about the symptoms and 2 weeks ago for about one day he was suicidal. He did not make an attempt. He currently does not feel suicidal and overall feels like his mental health is improving. This has been managed long-term by Dr. Cole.         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  He is back to work.  He tells me that about 2 years ago he completely changed his life, is working on being a more loving and kind person and having a healthier lifestyle.  Since his stent, he has even work harder on this, has hired a , has been eating healthy, and is eating a Mediterranean diet.  He is very motivated to keep these changes going.  He is adopted, so he does not know any of his family medical history.      PHYSICAL EXAMINATION:   /90 (BP Location: Right arm, Patient Position: Fowlers, Cuff Size: Adult Large)  Pulse 72  Ht 1.753 m (5' 9\")  Wt 113.9 kg (251 lb)  SpO2 94%  BMI 37.07 kg/m2  GENERAL:  Well-developed, well-nourished, delightful gentleman in no acute distress.   HEENT:  Normocephalic, atraumatic.   HEART:  Regular in rate and rhythm.  I do not appreciate murmur, rub or gallop.  Carotids are without bruit.   LUNGS:  Clear bilaterally.     EXTREMITIES:  Skin is warm and dry. Good capillary refill on both feet. His right foot has 1+ posterior tibialis and dorsalis pedis " pulses. His left foot has 2+ dorsalis pedis and posterior tibialis pulses.  SKIN:  Warm and dry.      ASSESSMENT AND PLAN:   1.  Coronary artery disease,  now status post drug-eluting stent for a 99% proximal LAD.  He is appropriately on aspirin and Effient beta blocker and ACEI.  Given his symptoms of fatigue and depression I'm going to stop his beta blocker. We'll switch him to amlodipine 5 mg daily as both an anti-anginal and antihypertensive. I'm hopeful this will alleviate a bit of his depression. In regards to his chest pain symptoms, he had a normal stress test in April. Although, I did have a low threshold for sending him back for angiogram.     2.  Bilateral calf pain that is atypical for claudication. Dr. Woodruff has ordered ABIs, I did request that these be changed to exercise ABIs we get more data. His right pulses are diminished and it would not be suppressed if he has peripheral arterial disease given his severe coronary disease at a young age. Because of his calf pain and like to keep him off of his Crestor and Zetia at this time he's been off 2 weeks and we should have seen results by now but do not want to complicate matters by restarting. We'll plan on restarting at her upcoming visit.    3.. Dyslipidemia, likely familial had been on Crestor 40 inches that area 10 as above.    3.  Hypertension, now well controlled.     4.  Obesity, wt elevated today, will d/c metoprolol as above.  I suspect his pain has kept him less active and this is contributing.  Hopefully we can improve his pain and therfore activity as well.      All see him back in one month, he is to call sooner if any of the medication changes made him feel worse.     Thank you for allowing me to participate in his care.         Rose Durham PA-C  1:33 PM 7/20/2017   [facility]  Pager: [phone]    This note was dictated using a voice recognition software, unintended errors may occur.      Orders this Visit:  Orders Placed This Encounter    Procedures     US Low Ext Arterial Dop Seg Pres w Ex (KARMA with Exercise)     Follow-Up with Cardiac Advanced Practice Provider     Orders Placed This Encounter   Medications     amLODIPine (NORVASC) 5 MG tablet     Sig: Take 1 tablet (5 mg) by mouth daily     Dispense:  30 tablet     Refill:  11     Medications Discontinued During This Encounter   Medication Reason     coenzyme Q-10 (CO Q10 MAXIMUM STRENGTH) 200 MG CAPS Therapy completed     metoprolol (TOPROL-XL) 50 MG 24 hr tablet      metoprolol (TOPROL-XL) 25 MG 24 hr tablet          Encounter Diagnoses   Name Primary?     Bilateral calf pain Yes     Benign essential HTN        CURRENT MEDICATIONS:  Current Outpatient Prescriptions   Medication Sig Dispense Refill     amLODIPine (NORVASC) 5 MG tablet Take 1 tablet (5 mg) by mouth daily 30 tablet 11     oxyCODONE (ROXICODONE) 5 MG IR tablet Take 1 tablet (5 mg) by mouth every 6 hours as needed for pain 60 tablet 0     traMADol (ULTRAM) 50 MG tablet Take 1-2 tablets ( mg) by mouth 3 times daily as needed for moderate pain 180 tablet 0     melatonin 3 MG tablet TAKE ONE TABLET BY MOUTH NIGHTLY AS NEEDED FOR SLEEP 30 tablet 1     gabapentin (NEURONTIN) 600 MG tablet Take 3 tablets (1,800 mg) by mouth 3 times daily 240 tablet 1     nitroglycerin (NITROSTAT) 0.4 MG sublingual tablet For chest pain place 1 tablet under the tongue every 5 minutes for 3 doses. If symptoms persist 5 minutes after 1st dose call 911. 25 tablet 3     aspirin 81 MG EC tablet Take 1 tablet (81 mg) by mouth daily 90 tablet 0     MAPAP 325 MG tablet TAKE TWO TABLETS BY MOUTH EVERY 4 HOURS AS NEEDED FOR MILD PAIN 100 tablet 11     lisinopril (PRINIVIL/ZESTRIL) 10 MG tablet Take 1 tablet (10 mg) by mouth daily 90 tablet 3     prasugrel (EFFIENT) 10 MG TABS tablet Take 1 tablet (10 mg) by mouth daily 90 tablet 3     VENTOLIN  (90 BASE) MCG/ACT Inhaler INHALE 2 PUFFS INTO THE LUNGS EVERY 4 HOURS AS NEEDED FOR SHORTNESS OF BREATH OR  DIFFICULTY BREATHING 18 g 0     ipratropium - albuterol 0.5 mg/2.5 mg/3 mL (DUONEB) 0.5-2.5 (3) MG/3ML neb solution Take 1 vial (3 mLs) by nebulization every 4 hours as needed for shortness of breath / dyspnea 30 vial 0     ezetimibe (ZETIA) 10 MG tablet Take 1 tablet (10 mg) by mouth daily (Patient not taking: Reported on 7/20/2017) 90 tablet 3     diazepam (VALIUM) 5 MG tablet Take 1 tablet (5 mg) by mouth every 12 hours as needed for anxiety or agitation (Patient not taking: Reported on 7/14/2017) 6 tablet 0     rosuvastatin (CRESTOR) 40 MG tablet Take 1 tablet (40 mg) by mouth daily (Patient not taking: Reported on 7/20/2017) 90 tablet 3       ALLERGIES     Allergies   Allergen Reactions     No Known Drug Allergies        PAST MEDICAL HISTORY:  Past Medical History:   Diagnosis Date     ADHD (attention deficit hyperactivity disorder)      Bipolar 1 disorder (H)      Depression      Gastro-oesophageal reflux disease      Hyperlipidaemia      Hypertension      Impaired fasting glucose      Nephrolithiasis      Obesity      Reactive airway disease     uses albuterol inhaler when has cold     Renal calcification      Rosacea      Tobacco abuse      Trigeminal neuralgia      Uncomplicated asthma        PAST SURGICAL HISTORY:  Past Surgical History:   Procedure Laterality Date     COMBINED CYSTOSCOPY, INSERT CATHETER URETER Left 3/25/2016    Procedure: COMBINED CYSTOSCOPY, INSERT CATHETER URETER;  Surgeon: Jorden Villafana MD;  Location:  OR     COMBINED CYSTOSCOPY, RETROGRADES, URETEROSCOPY, LASER HOLMIUM LITHOTRIPSY URETER(S), INSERT STENT Left 4/13/2016    Procedure: COMBINED CYSTOSCOPY, RETROGRADES, URETEROSCOPY, LASER HOLMIUM LITHOTRIPSY URETER(S), INSERT STENT;  Surgeon: Jorden Villafana MD;  Location:  OR     EXTRACORPOREAL SHOCK WAVE LITHOTRIPSY (ESWL) Left 3/25/2016    Procedure: EXTRACORPOREAL SHOCK WAVE LITHOTRIPSY (ESWL);  Surgeon: Jorden Villafana MD;  Location:  OR     EXTRACORPOREAL  "SHOCK WAVE LITHOTRIPSY, CYSTOSCOPY, INSERT STENT URETER(S), COMBINED Left 3/25/2016    Procedure: COMBINED EXTRACORPOREAL SHOCK WAVE LITHOTRIPSY, CYSTOSCOPY, INSERT STENT URETER(S);  Surgeon: Jorden Villafana MD;  Location:  OR     GENITOURINARY SURGERY  3 25 2016     Lithotripsy and stent placement     TONSILLECTOMY         FAMILY HISTORY:  Family History   Problem Relation Age of Onset     Psychotic Disorder Mother      DIABETES Sister      Psychotic Disorder Sister        SOCIAL HISTORY:  Social History     Social History     Marital status:      Spouse name: N/A     Number of children: N/A     Years of education: N/A     Social History Main Topics     Smoking status: Former Smoker     Packs/day: 0.50     Types: Cigarettes     Smokeless tobacco: Never Used     Alcohol use No     Drug use: No     Sexual activity: Not Currently     Other Topics Concern      Service No     Blood Transfusions No     Caffeine Concern No     Occupational Exposure No     Hobby Hazards No     Sleep Concern Yes     Stress Concern No     Weight Concern Yes     Special Diet No     low sodium     Back Care No     Exercise No     Seat Belt Yes     Self-Exams Yes     Social History Narrative        3 children    Working at [email or web address]       Review of Systems:  Skin:  Negative     Eyes:  Negative    ENT:  Negative    Respiratory:  Negative    Cardiovascular:  Negative for;lightheadedness;dizziness Positive for;fatigue;chest pain  Gastroenterology: Positive for excessive gas or bloating  Genitourinary:  Negative    Musculoskeletal:  Positive for foot pain  Neurologic:  Negative    Psychiatric:    sleep disturbances  Heme/Lymph/Imm:  Negative    Endocrine:  Negative      Physical Exam:  Vitals: /90 (BP Location: Right arm, Patient Position: Fowlers, Cuff Size: Adult Large)  Pulse 72  Ht 1.753 m (5' 9\")  Wt 113.9 kg (251 lb)  SpO2 94%  BMI 37.07 kg/m2   Please refer to dictation above for physical " exam    Recent Lab Results:  LIPID RESULTS:  Lab Results   Component Value Date    CHOL 181 05/18/2017    HDL 39 (L) 05/18/2017    LDL  05/18/2017     Cannot estimate LDL when triglyceride exceeds 400 mg/dL    LDL 77 05/18/2017    TRIG 511 (H) 05/18/2017    CHOLHDLRATIO 6.5 (H) 01/13/2015       LIVER ENZYME RESULTS:  Lab Results   Component Value Date    AST 18 06/04/2017    ALT 31 06/04/2017       CBC RESULTS:  Lab Results   Component Value Date    WBC 8.7 06/04/2017    RBC 4.42 06/04/2017    HGB 12.5 (L) 06/04/2017    HCT 39.1 (L) 06/04/2017    MCV 89 06/04/2017    MCH 28.3 06/04/2017    MCHC 32.0 06/04/2017    RDW 12.9 06/04/2017     06/04/2017       BMP RESULTS:  Lab Results   Component Value Date     07/20/2017    POTASSIUM 4.2 07/20/2017    CHLORIDE 102 07/20/2017    CO2 24 07/20/2017    ANIONGAP 11 07/20/2017     (H) 07/20/2017    BUN 23 07/20/2017    CR 0.86 07/20/2017    GFRESTIMATED >90  Non  GFR Calc   07/20/2017    GFRESTBLACK >90   GFR Calc   07/20/2017    LÁZARO 8.5 07/20/2017        A1C RESULTS:  Lab Results   Component Value Date    A1C 6.1 (H) 12/18/2015       INR RESULTS:  No results found for: INR    Rose Durham PA-C      CC  No referring provider defined for this encounter.

## 2017-07-20 NOTE — PROGRESS NOTES
PRIMARY CARDIOLOGIST:  Amish Wallace MD      REASON FOR VISIT:  Coronary artery disease, drug-eluting stent followup.      HISTORY OF PRESENT ILLNESS:  Mr. Vasquez is a delightful 46-year-old gentleman who had past medical history significant for asthma, untreated hypertension, untreated dyslipidemia, mild persistent asthma and trigeminal neuralgia treated with pain medications, who had developed multiple episodes of substernal chest pain and presented to the ER on 3 occasions in early February.  He eventually was sent for a treadmill stress test that had EKG changes and was associated with chest pain and then went on to have a positive stress echo.  He was seen by Dr. Wallace and sent to [facility] for angiogram.  There he was found to have a 99% proximal LAD lesion and underwent drug-eluting stent.  He was initially started on Brilinta, but due to insurance cost this was switched over to Effient.  He has a remaining 60% RCA lesion in the distal portion.  The remainder of the coronary tree has less disease.        Unfortunately, since I last saw him in March he's had 2 ER visits for chest pain. There are fairly significant with pain over 6 hours at 1.3 hours at the other. He was ruled out for an MI. During his first hospitalization he did undergo a nuclear stress test. He went 9 minutes on the treadmill and achieved 10.4 metastases. She normal EF and no EKG changes. There is no infarct or ischemia noted, perfusion was normal.    He comes in today still having briefer episodes of chest pain that are sometimes relieved with nitroglycerin but most concerned with leg pain. He endorses bilateral calf pain. These are not cramping pains just a gnawing pain that can go on all day. This typically they're when he wakes up somewhat relieved with walking but otherwise continues. If he takes tramadol this does reduce the pain. He tried coenzyme Q10 this did not make any change in his leg pain, he's been off his Crestor and  "Debbie for 2 weeks and this did not make a change either. He denies that his symptoms worsen with exercise. He has not had any sores on his feet. He also feels bloated and fatigue. He's noted in spite of changing his diet he's gained about 8 pounds this summer. He's been fairly depressed about the symptoms and 2 weeks ago for about one day he was suicidal. He did not make an attempt. He currently does not feel suicidal and overall feels like his mental health is improving. This has been managed long-term by Dr. Cole.         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  He is back to work.  He tells me that about 2 years ago he completely changed his life, is working on being a more loving and kind person and having a healthier lifestyle.  Since his stent, he has even work harder on this, has hired a , has been eating healthy, and is eating a Mediterranean diet.  He is very motivated to keep these changes going.  He is adopted, so he does not know any of his family medical history.      PHYSICAL EXAMINATION:   /90 (BP Location: Right arm, Patient Position: Fowlers, Cuff Size: Adult Large)  Pulse 72  Ht 1.753 m (5' 9\")  Wt 113.9 kg (251 lb)  SpO2 94%  BMI 37.07 kg/m2  GENERAL:  Well-developed, well-nourished, delightful gentleman in no acute distress.   HEENT:  Normocephalic, atraumatic.   HEART:  Regular in rate and rhythm.  I do not appreciate murmur, rub or gallop.  Carotids are without bruit.   LUNGS:  Clear bilaterally.     EXTREMITIES:  Skin is warm and dry. Good capillary refill on both feet. His right foot has 1+ posterior tibialis and dorsalis pedis pulses. His left foot has 2+ dorsalis pedis and posterior tibialis pulses.  SKIN:  Warm and dry.      ASSESSMENT AND PLAN:   1.  Coronary artery disease,  now status post drug-eluting stent for a 99% proximal LAD.  He is appropriately on aspirin and Effient beta blocker and ACEI.  Given his symptoms of fatigue and depression I'm " going to stop his beta blocker. We'll switch him to amlodipine 5 mg daily as both an anti-anginal and antihypertensive. I'm hopeful this will alleviate a bit of his depression. In regards to his chest pain symptoms, he had a normal stress test in April. Although, I did have a low threshold for sending him back for angiogram.     2.  Bilateral calf pain that is atypical for claudication. Dr. Woodruff has ordered ABIs, I did request that these be changed to exercise ABIs we get more data. His right pulses are diminished and it would not be suppressed if he has peripheral arterial disease given his severe coronary disease at a young age. Because of his calf pain and like to keep him off of his Crestor and Zetia at this time he's been off 2 weeks and we should have seen results by now but do not want to complicate matters by restarting. We'll plan on restarting at her upcoming visit.    3.. Dyslipidemia, likely familial had been on Crestor 40 inches that area 10 as above.    3.  Hypertension, now well controlled.     4.  Obesity, wt elevated today, will d/c metoprolol as above.  I suspect his pain has kept him less active and this is contributing.  Hopefully we can improve his pain and therfore activity as well.      All see him back in one month, he is to call sooner if any of the medication changes made him feel worse.     Thank you for allowing me to participate in his care.         Rose Durham PA-C  1:33 PM 7/20/2017   [facility]  Pager: [phone]    This note was dictated using a voice recognition software, unintended errors may occur.      Orders this Visit:  Orders Placed This Encounter   Procedures     US Low Ext Arterial Dop Seg Pres w Ex (KARMA with Exercise)     Follow-Up with Cardiac Advanced Practice Provider     Orders Placed This Encounter   Medications     amLODIPine (NORVASC) 5 MG tablet     Sig: Take 1 tablet (5 mg) by mouth daily     Dispense:  30 tablet     Refill:  11     Medications Discontinued  During This Encounter   Medication Reason     coenzyme Q-10 (CO Q10 MAXIMUM STRENGTH) 200 MG CAPS Therapy completed     metoprolol (TOPROL-XL) 50 MG 24 hr tablet      metoprolol (TOPROL-XL) 25 MG 24 hr tablet          Encounter Diagnoses   Name Primary?     Bilateral calf pain Yes     Benign essential HTN        CURRENT MEDICATIONS:  Current Outpatient Prescriptions   Medication Sig Dispense Refill     amLODIPine (NORVASC) 5 MG tablet Take 1 tablet (5 mg) by mouth daily 30 tablet 11     oxyCODONE (ROXICODONE) 5 MG IR tablet Take 1 tablet (5 mg) by mouth every 6 hours as needed for pain 60 tablet 0     traMADol (ULTRAM) 50 MG tablet Take 1-2 tablets ( mg) by mouth 3 times daily as needed for moderate pain 180 tablet 0     melatonin 3 MG tablet TAKE ONE TABLET BY MOUTH NIGHTLY AS NEEDED FOR SLEEP 30 tablet 1     gabapentin (NEURONTIN) 600 MG tablet Take 3 tablets (1,800 mg) by mouth 3 times daily 240 tablet 1     nitroglycerin (NITROSTAT) 0.4 MG sublingual tablet For chest pain place 1 tablet under the tongue every 5 minutes for 3 doses. If symptoms persist 5 minutes after 1st dose call 911. 25 tablet 3     aspirin 81 MG EC tablet Take 1 tablet (81 mg) by mouth daily 90 tablet 0     MAPAP 325 MG tablet TAKE TWO TABLETS BY MOUTH EVERY 4 HOURS AS NEEDED FOR MILD PAIN 100 tablet 11     lisinopril (PRINIVIL/ZESTRIL) 10 MG tablet Take 1 tablet (10 mg) by mouth daily 90 tablet 3     prasugrel (EFFIENT) 10 MG TABS tablet Take 1 tablet (10 mg) by mouth daily 90 tablet 3     VENTOLIN  (90 BASE) MCG/ACT Inhaler INHALE 2 PUFFS INTO THE LUNGS EVERY 4 HOURS AS NEEDED FOR SHORTNESS OF BREATH OR DIFFICULTY BREATHING 18 g 0     ipratropium - albuterol 0.5 mg/2.5 mg/3 mL (DUONEB) 0.5-2.5 (3) MG/3ML neb solution Take 1 vial (3 mLs) by nebulization every 4 hours as needed for shortness of breath / dyspnea 30 vial 0     ezetimibe (ZETIA) 10 MG tablet Take 1 tablet (10 mg) by mouth daily (Patient not taking: Reported on  7/20/2017) 90 tablet 3     diazepam (VALIUM) 5 MG tablet Take 1 tablet (5 mg) by mouth every 12 hours as needed for anxiety or agitation (Patient not taking: Reported on 7/14/2017) 6 tablet 0     rosuvastatin (CRESTOR) 40 MG tablet Take 1 tablet (40 mg) by mouth daily (Patient not taking: Reported on 7/20/2017) 90 tablet 3       ALLERGIES     Allergies   Allergen Reactions     No Known Drug Allergies        PAST MEDICAL HISTORY:  Past Medical History:   Diagnosis Date     ADHD (attention deficit hyperactivity disorder)      Bipolar 1 disorder (H)      Depression      Gastro-oesophageal reflux disease      Hyperlipidaemia      Hypertension      Impaired fasting glucose      Nephrolithiasis      Obesity      Reactive airway disease     uses albuterol inhaler when has cold     Renal calcification      Rosacea      Tobacco abuse      Trigeminal neuralgia      Uncomplicated asthma        PAST SURGICAL HISTORY:  Past Surgical History:   Procedure Laterality Date     COMBINED CYSTOSCOPY, INSERT CATHETER URETER Left 3/25/2016    Procedure: COMBINED CYSTOSCOPY, INSERT CATHETER URETER;  Surgeon: Jorden Villafana MD;  Location:  OR     COMBINED CYSTOSCOPY, RETROGRADES, URETEROSCOPY, LASER HOLMIUM LITHOTRIPSY URETER(S), INSERT STENT Left 4/13/2016    Procedure: COMBINED CYSTOSCOPY, RETROGRADES, URETEROSCOPY, LASER HOLMIUM LITHOTRIPSY URETER(S), INSERT STENT;  Surgeon: Jorden Villafana MD;  Location:  OR     EXTRACORPOREAL SHOCK WAVE LITHOTRIPSY (ESWL) Left 3/25/2016    Procedure: EXTRACORPOREAL SHOCK WAVE LITHOTRIPSY (ESWL);  Surgeon: Jorden Villafana MD;  Location:  OR     EXTRACORPOREAL SHOCK WAVE LITHOTRIPSY, CYSTOSCOPY, INSERT STENT URETER(S), COMBINED Left 3/25/2016    Procedure: COMBINED EXTRACORPOREAL SHOCK WAVE LITHOTRIPSY, CYSTOSCOPY, INSERT STENT URETER(S);  Surgeon: Jorden Villafana MD;  Location:  OR     GENITOURINARY SURGERY  3 25 2016     Lithotripsy and stent placement      "TONSILLECTOMY         FAMILY HISTORY:  Family History   Problem Relation Age of Onset     Psychotic Disorder Mother      DIABETES Sister      Psychotic Disorder Sister        SOCIAL HISTORY:  Social History     Social History     Marital status:      Spouse name: N/A     Number of children: N/A     Years of education: N/A     Social History Main Topics     Smoking status: Former Smoker     Packs/day: 0.50     Types: Cigarettes     Smokeless tobacco: Never Used     Alcohol use No     Drug use: No     Sexual activity: Not Currently     Other Topics Concern      Service No     Blood Transfusions No     Caffeine Concern No     Occupational Exposure No     Hobby Hazards No     Sleep Concern Yes     Stress Concern No     Weight Concern Yes     Special Diet No     low sodium     Back Care No     Exercise No     Seat Belt Yes     Self-Exams Yes     Social History Narrative        3 children    Working at [employer]       Review of Systems:  Skin:  Negative     Eyes:  Negative    ENT:  Negative    Respiratory:  Negative    Cardiovascular:  Negative for;lightheadedness;dizziness Positive for;fatigue;chest pain  Gastroenterology: Positive for excessive gas or bloating  Genitourinary:  Negative    Musculoskeletal:  Positive for foot pain  Neurologic:  Negative    Psychiatric:    sleep disturbances  Heme/Lymph/Imm:  Negative    Endocrine:  Negative      Physical Exam:  Vitals: /90 (BP Location: Right arm, Patient Position: Fowlers, Cuff Size: Adult Large)  Pulse 72  Ht 1.753 m (5' 9\")  Wt 113.9 kg (251 lb)  SpO2 94%  BMI 37.07 kg/m2   Please refer to dictation above for physical exam    Recent Lab Results:  LIPID RESULTS:  Lab Results   Component Value Date    CHOL 181 05/18/2017    HDL 39 (L) 05/18/2017    LDL  05/18/2017     Cannot estimate LDL when triglyceride exceeds 400 mg/dL    LDL 77 05/18/2017    TRIG 511 (H) 05/18/2017    CHOLHDLRATIO 6.5 (H) 01/13/2015       LIVER ENZYME " RESULTS:  Lab Results   Component Value Date    AST 18 06/04/2017    ALT 31 06/04/2017       CBC RESULTS:  Lab Results   Component Value Date    WBC 8.7 06/04/2017    RBC 4.42 06/04/2017    HGB 12.5 (L) 06/04/2017    HCT 39.1 (L) 06/04/2017    MCV 89 06/04/2017    MCH 28.3 06/04/2017    MCHC 32.0 06/04/2017    RDW 12.9 06/04/2017     06/04/2017       BMP RESULTS:  Lab Results   Component Value Date     07/20/2017    POTASSIUM 4.2 07/20/2017    CHLORIDE 102 07/20/2017    CO2 24 07/20/2017    ANIONGAP 11 07/20/2017     (H) 07/20/2017    BUN 23 07/20/2017    CR 0.86 07/20/2017    GFRESTIMATED >90  Non  GFR Calc   07/20/2017    GFRESTBLACK >90   GFR Calc   07/20/2017    LÁZARO 8.5 07/20/2017        A1C RESULTS:  Lab Results   Component Value Date    A1C 6.1 (H) 12/18/2015       INR RESULTS:  No results found for: INR        CC  No referring provider defined for this encounter.

## 2017-07-20 NOTE — PATIENT INSTRUCTIONS
Thanks for coming into [facility] today.    We discussed: we'll do the exercise test on your legs to see if your leg arteries are narrowed.      Medication changes:  Stay off crestor and zetia for now.    Stop taking Toprol XL/ metoprolol succinate   Start amlodipine 5 mg once a day in the morning.    Call if any of this makes you feel worse.      Follow up: with me in 1 month.        Please call the clinic at  [phone] with any questions or concerns and my our nurses will be happy to help.    Please call [phone] for scheduling.      Reminder: Please bring in all current medications, over the counter supplements and vitamin bottles to your next appointment.

## 2017-07-20 NOTE — LETTER
7/20/2017         RE: Cecil Vasquez  [address]   [facility] [address]        Dear Colleague,    Thank you for referring your patient, Cecil Vasquez, to the [facility]. Please see a copy of my visit note below.    PRIMARY CARDIOLOGIST:  Amish Wallace MD      REASON FOR VISIT:  Coronary artery disease, drug-eluting stent followup.      HISTORY OF PRESENT ILLNESS:  Mr. Vasquez is a delightful 46-year-old gentleman who had past medical history significant for asthma, untreated hypertension, untreated dyslipidemia, mild persistent asthma and trigeminal neuralgia treated with pain medications, who had developed multiple episodes of substernal chest pain and presented to the ER on 3 occasions in early February.  He eventually was sent for a treadmill stress test that had EKG changes and was associated with chest pain and then went on to have a positive stress echo.  He was seen by Dr. Wallace and sent to [facility] for angiogram.  There he was found to have a 99% proximal LAD lesion and underwent drug-eluting stent.  He was initially started on Brilinta, but due to insurance cost this was switched over to Effient.  He has a remaining 60% RCA lesion in the distal portion.  The remainder of the coronary tree has less disease.        Unfortunately, since I last saw him in March he's had 2 ER visits for chest pain. There are fairly significant with pain over 6 hours at 1.3 hours at the other. He was ruled out for an MI. During his first hospitalization he did undergo a nuclear stress test. He went 9 minutes on the treadmill and achieved 10.4 metastases. She normal EF and no EKG changes. There is no infarct or ischemia noted, perfusion was normal.    He comes in today still having briefer episodes of chest pain that are sometimes relieved with nitroglycerin but most concerned with leg pain. He endorses bilateral calf pain. These are not cramping pains just a gnawing pain that can go on all  "day. This typically they're when he wakes up somewhat relieved with walking but otherwise continues. If he takes tramadol this does reduce the pain. He tried coenzyme Q10 this did not make any change in his leg pain, he's been off his Crestor and Czerniak for 2 weeks and this did not make a change either. He denies that his symptoms worsen with exercise. He has not had any sores on his feet. He also feels bloated and fatigue. He's noted in spite of changing his diet he's gained about 8 pounds this summer. He's been fairly depressed about the symptoms and 2 weeks ago for about one day he was suicidal. He did not make an attempt. He currently does not feel suicidal and overall feels like his mental health is improving. This has been managed long-term by Dr. Cole.         SOCIAL HISTORY:  He works driving a forklift.  He is back to work.  He tells me that about 2 years ago he completely changed his life, is working on being a more loving and kind person and having a healthier lifestyle.  Since his stent, he has even work harder on this, has hired a , has been eating healthy, and is eating a Mediterranean diet.  He is very motivated to keep these changes going.  He is adopted, so he does not know any of his family medical history.      PHYSICAL EXAMINATION:   /90 (BP Location: Right arm, Patient Position: Fowlers, Cuff Size: Adult Large)  Pulse 72  Ht 1.753 m (5' 9\")  Wt 113.9 kg (251 lb)  SpO2 94%  BMI 37.07 kg/m2  GENERAL:  Well-developed, well-nourished, delightful gentleman in no acute distress.   HEENT:  Normocephalic, atraumatic.   HEART:  Regular in rate and rhythm.  I do not appreciate murmur, rub or gallop.  Carotids are without bruit.   LUNGS:  Clear bilaterally.     EXTREMITIES:  Skin is warm and dry. Good capillary refill on both feet. His right foot has 1+ posterior tibialis and dorsalis pedis pulses. His left foot has 2+ dorsalis pedis and posterior tibialis pulses.  SKIN:  " Warm and dry.      ASSESSMENT AND PLAN:   1.  Coronary artery disease,  now status post drug-eluting stent for a 99% proximal LAD.  He is appropriately on aspirin and Effient beta blocker and ACEI.  Given his symptoms of fatigue and depression I'm going to stop his beta blocker. We'll switch him to amlodipine 5 mg daily as both an anti-anginal and antihypertensive. I'm hopeful this will alleviate a bit of his depression. In regards to his chest pain symptoms, he had a normal stress test in April. Although, I did have a low threshold for sending him back for angiogram.     2.  Bilateral calf pain that is atypical for claudication. Dr. Woodruff has ordered ABIs, I did request that these be changed to exercise ABIs we get more data. His right pulses are diminished and it would not be suppressed if he has peripheral arterial disease given his severe coronary disease at a young age. Because of his calf pain and like to keep him off of his Crestor and Zetia at this time he's been off 2 weeks and we should have seen results by now but do not want to complicate matters by restarting. We'll plan on restarting at her upcoming visit.    3.. Dyslipidemia, likely familial had been on Crestor 40 inches that area 10 as above.    3.  Hypertension, now well controlled.     4.  Obesity, wt elevated today, will d/c metoprolol as above.  I suspect his pain has kept him less active and this is contributing.  Hopefully we can improve his pain and therfore activity as well.      All see him back in one month, he is to call sooner if any of the medication changes made him feel worse.     Thank you for allowing me to participate in his care.         Rose Durham PA-C  1:33 PM 7/20/2017   [facility]  Pager: [phone]    This note was dictated using a voice recognition software, unintended errors may occur.      Orders this Visit:  Orders Placed This Encounter   Procedures     US Low Ext Arterial Dop Seg Pres w Ex (KARMA with Exercise)      Follow-Up with Cardiac Advanced Practice Provider     Orders Placed This Encounter   Medications     amLODIPine (NORVASC) 5 MG tablet     Sig: Take 1 tablet (5 mg) by mouth daily     Dispense:  30 tablet     Refill:  11     Medications Discontinued During This Encounter   Medication Reason     coenzyme Q-10 (CO Q10 MAXIMUM STRENGTH) 200 MG CAPS Therapy completed     metoprolol (TOPROL-XL) 50 MG 24 hr tablet      metoprolol (TOPROL-XL) 25 MG 24 hr tablet          Encounter Diagnoses   Name Primary?     Bilateral calf pain Yes     Benign essential HTN        CURRENT MEDICATIONS:  Current Outpatient Prescriptions   Medication Sig Dispense Refill     amLODIPine (NORVASC) 5 MG tablet Take 1 tablet (5 mg) by mouth daily 30 tablet 11     oxyCODONE (ROXICODONE) 5 MG IR tablet Take 1 tablet (5 mg) by mouth every 6 hours as needed for pain 60 tablet 0     traMADol (ULTRAM) 50 MG tablet Take 1-2 tablets ( mg) by mouth 3 times daily as needed for moderate pain 180 tablet 0     melatonin 3 MG tablet TAKE ONE TABLET BY MOUTH NIGHTLY AS NEEDED FOR SLEEP 30 tablet 1     gabapentin (NEURONTIN) 600 MG tablet Take 3 tablets (1,800 mg) by mouth 3 times daily 240 tablet 1     nitroglycerin (NITROSTAT) 0.4 MG sublingual tablet For chest pain place 1 tablet under the tongue every 5 minutes for 3 doses. If symptoms persist 5 minutes after 1st dose call 911. 25 tablet 3     aspirin 81 MG EC tablet Take 1 tablet (81 mg) by mouth daily 90 tablet 0     MAPAP 325 MG tablet TAKE TWO TABLETS BY MOUTH EVERY 4 HOURS AS NEEDED FOR MILD PAIN 100 tablet 11     lisinopril (PRINIVIL/ZESTRIL) 10 MG tablet Take 1 tablet (10 mg) by mouth daily 90 tablet 3     prasugrel (EFFIENT) 10 MG TABS tablet Take 1 tablet (10 mg) by mouth daily 90 tablet 3     VENTOLIN  (90 BASE) MCG/ACT Inhaler INHALE 2 PUFFS INTO THE LUNGS EVERY 4 HOURS AS NEEDED FOR SHORTNESS OF BREATH OR DIFFICULTY BREATHING 18 g 0     ipratropium - albuterol 0.5 mg/2.5 mg/3 mL  (DUONEB) 0.5-2.5 (3) MG/3ML neb solution Take 1 vial (3 mLs) by nebulization every 4 hours as needed for shortness of breath / dyspnea 30 vial 0     ezetimibe (ZETIA) 10 MG tablet Take 1 tablet (10 mg) by mouth daily (Patient not taking: Reported on 7/20/2017) 90 tablet 3     diazepam (VALIUM) 5 MG tablet Take 1 tablet (5 mg) by mouth every 12 hours as needed for anxiety or agitation (Patient not taking: Reported on 7/14/2017) 6 tablet 0     rosuvastatin (CRESTOR) 40 MG tablet Take 1 tablet (40 mg) by mouth daily (Patient not taking: Reported on 7/20/2017) 90 tablet 3       ALLERGIES     Allergies   Allergen Reactions     No Known Drug Allergies        PAST MEDICAL HISTORY:  Past Medical History:   Diagnosis Date     ADHD (attention deficit hyperactivity disorder)      Bipolar 1 disorder (H)      Depression      Gastro-oesophageal reflux disease      Hyperlipidaemia      Hypertension      Impaired fasting glucose      Nephrolithiasis      Obesity      Reactive airway disease     uses albuterol inhaler when has cold     Renal calcification      Rosacea      Tobacco abuse      Trigeminal neuralgia      Uncomplicated asthma        PAST SURGICAL HISTORY:  Past Surgical History:   Procedure Laterality Date     COMBINED CYSTOSCOPY, INSERT CATHETER URETER Left 3/25/2016    Procedure: COMBINED CYSTOSCOPY, INSERT CATHETER URETER;  Surgeon: Jorden Villafana MD;  Location:  OR     COMBINED CYSTOSCOPY, RETROGRADES, URETEROSCOPY, LASER HOLMIUM LITHOTRIPSY URETER(S), INSERT STENT Left 4/13/2016    Procedure: COMBINED CYSTOSCOPY, RETROGRADES, URETEROSCOPY, LASER HOLMIUM LITHOTRIPSY URETER(S), INSERT STENT;  Surgeon: Jorden Villafana MD;  Location:  OR     EXTRACORPOREAL SHOCK WAVE LITHOTRIPSY (ESWL) Left 3/25/2016    Procedure: EXTRACORPOREAL SHOCK WAVE LITHOTRIPSY (ESWL);  Surgeon: Jorden Villafana MD;  Location:  OR     EXTRACORPOREAL SHOCK WAVE LITHOTRIPSY, CYSTOSCOPY, INSERT STENT URETER(S), COMBINED Left  "3/25/2016    Procedure: COMBINED EXTRACORPOREAL SHOCK WAVE LITHOTRIPSY, CYSTOSCOPY, INSERT STENT URETER(S);  Surgeon: Jorden Villafana MD;  Location:  OR     GENITOURINARY SURGERY  3 25 2016     Lithotripsy and stent placement     TONSILLECTOMY         FAMILY HISTORY:  Family History   Problem Relation Age of Onset     Psychotic Disorder Mother      DIABETES Sister      Psychotic Disorder Sister        SOCIAL HISTORY:  Social History     Social History     Marital status:      Spouse name: N/A     Number of children: N/A     Years of education: N/A     Social History Main Topics     Smoking status: Former Smoker     Packs/day: 0.50     Types: Cigarettes     Smokeless tobacco: Never Used     Alcohol use No     Drug use: No     Sexual activity: Not Currently     Other Topics Concern      Service No     Blood Transfusions No     Caffeine Concern No     Occupational Exposure No     Hobby Hazards No     Sleep Concern Yes     Stress Concern No     Weight Concern Yes     Special Diet No     low sodium     Back Care No     Exercise No     Seat Belt Yes     Self-Exams Yes     Social History Narrative        3 children    Working at [employer]       Review of Systems:  Skin:  Negative     Eyes:  Negative    ENT:  Negative    Respiratory:  Negative    Cardiovascular:  Negative for;lightheadedness;dizziness Positive for;fatigue;chest pain  Gastroenterology: Positive for excessive gas or bloating  Genitourinary:  Negative    Musculoskeletal:  Positive for foot pain  Neurologic:  Negative    Psychiatric:    sleep disturbances  Heme/Lymph/Imm:  Negative    Endocrine:  Negative      Physical Exam:  Vitals: /90 (BP Location: Right arm, Patient Position: Fowlers, Cuff Size: Adult Large)  Pulse 72  Ht 1.753 m (5' 9\")  Wt 113.9 kg (251 lb)  SpO2 94%  BMI 37.07 kg/m2   Please refer to dictation above for physical exam    Recent Lab Results:  LIPID RESULTS:  Lab Results   Component Value Date    " CHOL 181 05/18/2017    HDL 39 (L) 05/18/2017    LDL  05/18/2017     Cannot estimate LDL when triglyceride exceeds 400 mg/dL    LDL 77 05/18/2017    TRIG 511 (H) 05/18/2017    CHOLHDLRATIO 6.5 (H) 01/13/2015       LIVER ENZYME RESULTS:  Lab Results   Component Value Date    AST 18 06/04/2017    ALT 31 06/04/2017       CBC RESULTS:  Lab Results   Component Value Date    WBC 8.7 06/04/2017    RBC 4.42 06/04/2017    HGB 12.5 (L) 06/04/2017    HCT 39.1 (L) 06/04/2017    MCV 89 06/04/2017    MCH 28.3 06/04/2017    MCHC 32.0 06/04/2017    RDW 12.9 06/04/2017     06/04/2017       BMP RESULTS:  Lab Results   Component Value Date     07/20/2017    POTASSIUM 4.2 07/20/2017    CHLORIDE 102 07/20/2017    CO2 24 07/20/2017    ANIONGAP 11 07/20/2017     (H) 07/20/2017    BUN 23 07/20/2017    CR 0.86 07/20/2017    GFRESTIMATED >90  Non  GFR Calc   07/20/2017    GFRESTBLACK >90   GFR Calc   07/20/2017    LÁZARO 8.5 07/20/2017        A1C RESULTS:  Lab Results   Component Value Date    A1C 6.1 (H) 12/18/2015       INR RESULTS:  No results found for: INR        CC  No referring provider defined for this encounter.          Again, thank you for allowing me to participate in the care of your patient.        Sincerely,        Rose Durham PA-C

## 2017-07-20 NOTE — MR AVS SNAPSHOT
After Visit Summary   7/20/2017    Cecil Vasquez    MRN: [id number]           Patient Information     Date Of Birth          1970        Visit Information        Provider Department      7/20/2017 12:40 PM Marva, Rose Swan PA-C [facility]        Today's Diagnoses     Bilateral calf pain    -  1    Benign essential HTN          Care Instructions    Thanks for coming into [facility] clinic today.    We discussed: we'll do the exercise test on your legs to see if your leg arteries are narrowed.      Medication changes:  Stay off crestor and zetia for now.    Stop taking Toprol XL/ metoprolol succinate   Start amlodipine 5 mg once a day in the morning.    Call if any of this makes you feel worse.      Follow up: with me in 1 month.        Please call the clinic at  [phone] with any questions or concerns and my our nurses will be happy to help.    Please call [phone] for scheduling.      Reminder: Please bring in all current medications, over the counter supplements and vitamin bottles to your next appointment.              Follow-ups after your visit        Additional Services     Follow-Up with Cardiac Advanced Practice Provider                 Your next 10 appointments already scheduled     Jul 27, 2017 10:30 AM CDT   US KARMA DOPPLER NO EXERCISE with PHUS3   [facility]ound ([facility])    [address] [place] [id number] [phone]           Please bring a list of your medicines (including vitamins, minerals and over-the-counter drugs). Also, tell your doctor about any allergies you may have. Wear comfortable clothes and leave your valuables at home.  No caffeine or tobacco for 1 hour prior to exam.  Please call the Imaging Department at your exam site with any questions.            Jul 28, 2017 10:40 AM CDT   SHORT with Gonzalo Cole MD   [facility] ([facility]  "[place])    [address] [facility] [id number] [phone]              Future tests that were ordered for you today     Open Future Orders        Priority Expected Expires Ordered    Follow-Up with Cardiac Advanced Practice Provider Routine 2017    US Low Ext Arterial Dop Seg Pres w Ex (KARMA with Exercise) Routine 2017            Who to contact     If you have questions or need follow up information about today's clinic visit or your schedule please contact [facility] directly at [phone].  Normal or non-critical lab and imaging results will be communicated to you by [employer], letter or phone within 4 business days after the clinic has received the results. If you do not hear from us within 7 days, please contact the clinic through [employer] or phone. If you have a critical or abnormal lab result, we will notify you by phone as soon as possible.  Submit refill requests through [employer] or call your pharmacy and they will forward the refill request to us. Please allow 3 business days for your refill to be completed.          Additional Information About Your Visit        [email or web address] Information     [employer] lets you send messages to your doctor, view your test results, renew your prescriptions, schedule appointments and more. To sign up, go to [email or web address] . Click on \"Log in\" on the left side of the screen, which will take you to the Welcome page. Then click on \"Sign up Now\" on the right side of the page.     You will be asked to enter the access code listed below, as well as some personal information. Please follow the directions to create your username and password.     Your access code is: [email or web address]  Expires: 10/18/2017 11:03 AM     Your access code will  in 90 days. If you need help or a new code, please call your [facility] or [phone].        Care EveryWhere ID     This is your Care EveryWhere ID. This could " "be used by other organizations to access your [place] medical records  [id number]        Your Vitals Were     Pulse Height Pulse Oximetry BMI (Body Mass Index)          72 1.753 m (5' 9\") 94% 37.07 kg/m2         Blood Pressure from Last 3 Encounters:   07/20/17 126/90   07/14/17 (!) 146/92   06/13/17 136/88    Weight from Last 3 Encounters:   07/20/17 113.9 kg (251 lb)   07/14/17 113.7 kg (250 lb 9.6 oz)   06/13/17 112.4 kg (247 lb 11.2 oz)                 Today's Medication Changes          These changes are accurate as of: 7/20/17  1:16 PM.  If you have any questions, ask your nurse or doctor.               Start taking these medicines.        Dose/Directions    amLODIPine 5 MG tablet   Commonly known as:  NORVASC   Used for:  Benign essential HTN   Started by:  Rose Durham PA-C        Dose:  5 mg   Take 1 tablet (5 mg) by mouth daily   Quantity:  30 tablet   Refills:  11         Stop taking these medicines if you haven't already. Please contact your care team if you have questions.     metoprolol 25 MG 24 hr tablet   Commonly known as:  TOPROL-XL   Stopped by:  Rose Durham PA-C           metoprolol 50 MG 24 hr tablet   Commonly known as:  TOPROL-XL   Stopped by:  Rose Durham PA-C                Where to get your medicines      These medications were sent to [facility] [address]   [address] [place] [address] [facility] [address]     Phone:  [phone]     amLODIPine 5 MG tablet                Primary Care Provider Office Phone # Fax #    Gonzalo Cole [phone]       [address]   [facility] [address]        Equal Access to Services     KRISTIE PRINCE AH: Candelaria Ponce, wakikoda luqadaha, qaybta kaalmada gino, david reynoso. So [facility] [phone].    ATENCIÓN: Si habla español, tiene a maynard disposición servicios gratuitos de asistencia lingüística. " Tatum olivier [phone].    We comply with applicable federal civil rights laws and [place] laws. We do not discriminate on the basis of race, color, national origin, age, disability sex, sexual orientation or gender identity.            Thank you!     Thank you for choosing [facility]  for your care. Our goal is always to provide you with excellent care. Hearing back from our patients is one way we can continue to improve our services. Please take a few minutes to complete the written survey that you may receive in the mail after your visit with us. Thank you!             Your Updated Medication List - Protect others around you: Learn how to safely use, store and throw away your medicines at www.disposemymeds.org.          This list is accurate as of: 7/20/17  1:16 PM.  Always use your most recent med list.                   Brand Name Dispense Instructions for use Diagnosis    amLODIPine 5 MG tablet    NORVASC    30 tablet    Take 1 tablet (5 mg) by mouth daily    Benign essential HTN       aspirin 81 MG EC tablet     90 tablet    Take 1 tablet (81 mg) by mouth daily    Coronary artery disease involving native coronary artery of native heart with unstable angina pectoris (H)       diazepam 5 MG tablet    VALIUM    6 tablet    Take 1 tablet (5 mg) by mouth every 12 hours as needed for anxiety or agitation        ezetimibe 10 MG tablet    ZETIA    90 tablet    Take 1 tablet (10 mg) by mouth daily    Hyperlipidemia LDL goal <70       gabapentin 600 MG tablet    NEURONTIN    240 tablet    Take 3 tablets (1,800 mg) by mouth 3 times daily    Neuropathy (H)       ipratropium - albuterol 0.5 mg/2.5 mg/3 mL 0.5-2.5 (3) MG/3ML neb solution    DUONEB    30 vial    Take 1 vial (3 mLs) by nebulization every 4 hours as needed for shortness of breath / dyspnea    Asthma with acute exacerbation, unspecified asthma severity       lisinopril 10 MG tablet    PRINIVIL/ZESTRIL    90 tablet    Take 1 tablet (10 mg) by  mouth daily    Essential hypertension with goal blood pressure less than 140/90, ACS (acute coronary syndrome) (H)       MAPAP 325 MG tablet   Generic drug:  acetaminophen     100 tablet    TAKE TWO TABLETS BY MOUTH EVERY 4 HOURS AS NEEDED FOR MILD PAIN    Trigeminal neuralgia       melatonin 3 MG tablet     30 tablet    TAKE ONE TABLET BY MOUTH NIGHTLY AS NEEDED FOR SLEEP    Trigeminal neuralgia       nitroGLYcerin 0.4 MG sublingual tablet    NITROSTAT    25 tablet    For chest pain place 1 tablet under the tongue every 5 minutes for 3 doses. If symptoms persist 5 minutes after 1st dose call 911.    Coronary artery disease involving native coronary artery of native heart with unstable angina pectoris (H)       oxyCODONE 5 MG IR tablet    ROXICODONE    60 tablet    Take 1 tablet (5 mg) by mouth every 6 hours as needed for pain    Neuropathy (H)       prasugrel 10 MG Tabs tablet    EFFIENT    90 tablet    Take 1 tablet (10 mg) by mouth daily    ACS (acute coronary syndrome) (H), Coronary artery disease involving native coronary artery of native heart with unstable angina pectoris (H)       rosuvastatin 40 MG tablet    CRESTOR    90 tablet    Take 1 tablet (40 mg) by mouth daily    ACS (acute coronary syndrome) (H)       traMADol 50 MG tablet    ULTRAM    180 tablet    Take 1-2 tablets ( mg) by mouth 3 times daily as needed for moderate pain    Neuropathy (H)       VENTOLIN  (90 BASE) MCG/ACT Inhaler   Generic drug:  albuterol     18 g    INHALE 2 PUFFS INTO THE LUNGS EVERY 4 HOURS AS NEEDED FOR SHORTNESS OF BREATH OR DIFFICULTY BREATHING    Asthma with acute exacerbation, unspecified asthma severity

## 2017-07-28 ENCOUNTER — TELEPHONE (OUTPATIENT)
Dept: FAMILY MEDICINE | Facility: CLINIC | Age: 47
End: 2017-07-28

## 2017-07-28 DIAGNOSIS — G89.4 CHRONIC PAIN SYNDROME: ICD-10-CM

## 2017-07-28 DIAGNOSIS — R51.9 HEAD AND FACE PAIN: Primary | ICD-10-CM

## 2017-07-28 NOTE — TELEPHONE ENCOUNTER
Reason for Call: Request for an order or referral:    Order or referral being requested: referral for a pain clinic     Date needed: as soon as possible    Has the patient been seen by the PCP for this problem? YES    Additional comments:     Phone number Patient can be reached at:  Home number on file [phone] (home)    Best Time:  any    Can we leave a detailed message on this number?  YES    Call taken on 7/28/2017 at 2:59 PM by Dipti Russo

## 2017-07-28 NOTE — TELEPHONE ENCOUNTER
Called patient and left a voicemail to call the clinic back, when he calls back please let him know that the referral was sent.      Luiza Austin, CMA

## 2017-08-01 ENCOUNTER — TELEPHONE (OUTPATIENT)
Dept: CARDIOLOGY | Facility: CLINIC | Age: 47
End: 2017-08-01

## 2017-08-01 ENCOUNTER — OFFICE VISIT (OUTPATIENT)
Dept: FAMILY MEDICINE | Facility: CLINIC | Age: 47
End: 2017-08-01
Payer: COMMERCIAL

## 2017-08-01 VITALS
DIASTOLIC BLOOD PRESSURE: 86 MMHG | BODY MASS INDEX: 36.59 KG/M2 | HEART RATE: 89 BPM | SYSTOLIC BLOOD PRESSURE: 128 MMHG | RESPIRATION RATE: 20 BRPM | OXYGEN SATURATION: 96 % | TEMPERATURE: 97.7 F | WEIGHT: 247.8 LBS

## 2017-08-01 DIAGNOSIS — G89.4 CHRONIC PAIN SYNDROME: Primary | ICD-10-CM

## 2017-08-01 DIAGNOSIS — G62.9 NEUROPATHY: ICD-10-CM

## 2017-08-01 PROCEDURE — 99214 OFFICE O/P EST MOD 30 MIN: CPT | Performed by: FAMILY MEDICINE

## 2017-08-01 RX ORDER — AMITRIPTYLINE HYDROCHLORIDE 10 MG/1
TABLET ORAL
Qty: 90 TABLET | Refills: 0 | Status: ON HOLD | OUTPATIENT
Start: 2017-08-01 | End: 2019-10-16

## 2017-08-01 RX ORDER — OXYCODONE HYDROCHLORIDE 5 MG/1
5-10 TABLET ORAL EVERY 6 HOURS PRN
Qty: 70 TABLET | Refills: 0 | Status: SHIPPED | OUTPATIENT
Start: 2017-08-01 | End: 2017-08-25

## 2017-08-01 ASSESSMENT — PAIN SCALES - GENERAL: PAINLEVEL: MODERATE PAIN (5)

## 2017-08-01 NOTE — MR AVS SNAPSHOT
After Visit Summary   8/1/2017    Cecil Vasquez    MRN: [id number]           Patient Information     Date Of Birth          1970        Visit Information        Provider Department      8/1/2017 8:20 AM Gonzalo Cole MD [facility]        Today's Diagnoses     Chronic pain syndrome    -  1    Neuropathy (H)           Follow-ups after your visit        Your next 10 appointments already scheduled     Aug 09, 2017  3:00 PM CDT   Return Visit with Amish Wallace MD   [facility] ([address] [address]   [phone]            Aug 10, 2017 10:30 AM CDT   US LOWER EXTREMITY ARTERIAL DOPPLER SEG PRESSURES W EXER with PHUS3   [facility]ound ([facility])    [address] [place] [id number]   [phone]           Please bring a list of your medicines (including vitamins, minerals and over-the-counter drugs). Also, tell your doctor about any allergies you may have. Wear comfortable clothes and leave your valuables at home.  No caffeine or tobacco for 1 hour prior to exam.  Please call the Imaging Department at your exam site with any questions.            Aug 31, 2017 10:40 AM CDT   Return Visit with Rose Durham PA-C   [facility] ([address] [address]   [phone]            Oct 18, 2017  1:00 PM CDT   New Visit with Andrea Starr LP   [facility] ([facility] [place])    [address]  [place] [id number] [phone]            Oct 18, 2017  2:00 PM CDT   New Visit with CIRO Wilhelm CNP   [facility] ([facility] [place])    [address] [facility]  [place] [id number] [phone]              Who to contact     If you have questions or need follow up information about today's clinic visit or  "your schedule please contact [facility] directly at [phone].  Normal or non-critical lab and imaging results will be communicated to you by MyChart, letter or phone within 4 business days after the clinic has received the results. If you do not hear from us within 7 days, please contact the clinic through CYPHERhart or phone. If you have a critical or abnormal lab result, we will notify you by phone as soon as possible.  Submit refill requests through Acteavo or call your pharmacy and they will forward the refill request to us. Please allow 3 business days for your refill to be completed.          Additional Information About Your Visit        CYPHERharXinguodu Information     Acteavo lets you send messages to your doctor, view your test results, renew your prescriptions, schedule appointments and more. To sign up, go to [email or web address] . Click on \"Log in\" on the left side of the screen, which will take you to the Welcome page. Then click on \"Sign up Now\" on the right side of the page.     You will be asked to enter the access code listed below, as well as some personal information. Please follow the directions to create your username and password.     Your access code is: [email or web address]  Expires: 10/18/2017 11:03 AM     Your access code will  in 90 days. If you need help or a new code, please call your [facility] clinic or [phone].        Care EveryWhere ID     This is your Care EveryWhere ID. This could be used by other organizations to access your [facility] medical records  [id number]        Your Vitals Were     Pulse Temperature Respirations Pulse Oximetry BMI (Body Mass Index)       89 97.7  F (36.5  C) (Temporal) 20 96% 36.59 kg/m2        Blood Pressure from Last 3 Encounters:   17 128/86   17 126/90   17 (!) 146/92    Weight from Last 3 Encounters:   17 247 lb 12.8 oz (112.4 kg)   17 251 lb (113.9 kg)   17 250 lb 9.6 oz (113.7 kg)              Today, " you had the following     No orders found for display         Today's Medication Changes          These changes are accurate as of: 8/1/17 11:59 PM.  If you have any questions, ask your nurse or doctor.               Start taking these medicines.        Dose/Directions    amitriptyline 10 MG tablet   Commonly known as:  ELAVIL   Used for:  Chronic pain syndrome   Started by:  Gonzalo Cole MD        Start at 1 tab at bedtime for 3 days, then 2 tabs at bedtime for 3 days, then 3 tabs at bedtime.  Plan to increase dose to 50-75 mg at bedtime after 1 month   Quantity:  90 tablet   Refills:  0         These medicines have changed or have updated prescriptions.        Dose/Directions    oxyCODONE 5 MG IR tablet   Commonly known as:  ROXICODONE   This may have changed:    - how much to take  - additional instructions   Used for:  Neuropathy (H)   Changed by:  Gonzalo Cole MD        Dose:  5-10 mg   Take 1-2 tablets (5-10 mg) by mouth every 6 hours as needed for pain Max 3 per day   Quantity:  70 tablet   Refills:  0            Where to get your medicines      Some of these will need a paper prescription and others can be bought over the counter.  Ask your nurse if you have questions.     Bring a paper prescription for each of these medications     amitriptyline 10 MG tablet    oxyCODONE 5 MG IR tablet                Primary Care Provider Office Phone # Fax #    Gonzalo Cole [phone]       [address]   [facility] [address]        Equal Access to Services     [facility] AH: Hadii jeronimo ku hadasho Sofamali, waaxda luqadaha, qaybta kaalmada adeegyada, david reynoso. So [facility] [phone].    ATENCIÓN: Si habla español, tiene a maynard disposición servicios gratuitos de asistencia lingüística. Llame al [phone].    We comply with applicable federal civil rights laws and [place] laws. We do not discriminate on the basis of  race, color, national origin, age, disability sex, sexual orientation or gender identity.            Thank you!     Thank you for choosing [facility]  for your care. Our goal is always to provide you with excellent care. Hearing back from our patients is one way we can continue to improve our services. Please take a few minutes to complete the written survey that you may receive in the mail after your visit with us. Thank you!             Your Updated Medication List - Protect others around you: Learn how to safely use, store and throw away your medicines at www.disposemymeds.org.          This list is accurate as of: 8/1/17 11:59 PM.  Always use your most recent med list.                   Brand Name Dispense Instructions for use Diagnosis    amitriptyline 10 MG tablet    ELAVIL    90 tablet    Start at 1 tab at bedtime for 3 days, then 2 tabs at bedtime for 3 days, then 3 tabs at bedtime.  Plan to increase dose to 50-75 mg at bedtime after 1 month    Chronic pain syndrome       amLODIPine 5 MG tablet    NORVASC    30 tablet    Take 1 tablet (5 mg) by mouth daily    Benign essential HTN       aspirin 81 MG EC tablet     90 tablet    Take 1 tablet (81 mg) by mouth daily    Coronary artery disease involving native coronary artery of native heart with unstable angina pectoris (H)       ezetimibe 10 MG tablet    ZETIA    90 tablet    Take 1 tablet (10 mg) by mouth daily    Hyperlipidemia LDL goal <70       gabapentin 600 MG tablet    NEURONTIN    240 tablet    Take 3 tablets (1,800 mg) by mouth 3 times daily    Neuropathy (H)       ipratropium - albuterol 0.5 mg/2.5 mg/3 mL 0.5-2.5 (3) MG/3ML neb solution    DUONEB    30 vial    Take 1 vial (3 mLs) by nebulization every 4 hours as needed for shortness of breath / dyspnea    Asthma with acute exacerbation, unspecified asthma severity       lisinopril 10 MG tablet    PRINIVIL/ZESTRIL    90 tablet    Take 1 tablet (10 mg) by mouth daily    Essential  hypertension with goal blood pressure less than 140/90, ACS (acute coronary syndrome) (H)       MAPAP 325 MG tablet   Generic drug:  acetaminophen     100 tablet    TAKE TWO TABLETS BY MOUTH EVERY 4 HOURS AS NEEDED FOR MILD PAIN    Trigeminal neuralgia       melatonin 3 MG tablet     30 tablet    TAKE ONE TABLET BY MOUTH NIGHTLY AS NEEDED FOR SLEEP    Trigeminal neuralgia       nitroGLYcerin 0.4 MG sublingual tablet    NITROSTAT    25 tablet    For chest pain place 1 tablet under the tongue every 5 minutes for 3 doses. If symptoms persist 5 minutes after 1st dose call 911.    Coronary artery disease involving native coronary artery of native heart with unstable angina pectoris (H)       oxyCODONE 5 MG IR tablet    ROXICODONE    70 tablet    Take 1-2 tablets (5-10 mg) by mouth every 6 hours as needed for pain Max 3 per day    Neuropathy (H)       prasugrel 10 MG Tabs tablet    EFFIENT    90 tablet    Take 1 tablet (10 mg) by mouth daily    ACS (acute coronary syndrome) (H), Coronary artery disease involving native coronary artery of native heart with unstable angina pectoris (H)       traMADol 50 MG tablet    ULTRAM    180 tablet    Take 1-2 tablets ( mg) by mouth 3 times daily as needed for moderate pain    Neuropathy (H)       VENTOLIN  (90 BASE) MCG/ACT Inhaler   Generic drug:  albuterol     18 g    INHALE 2 PUFFS INTO THE LUNGS EVERY 4 HOURS AS NEEDED FOR SHORTNESS OF BREATH OR DIFFICULTY BREATHING    Asthma with acute exacerbation, unspecified asthma severity

## 2017-08-01 NOTE — PROGRESS NOTES
SUBJECTIVE:                                                    Cecil Vasquez is a 46 year old male who presents to clinic today for the following health issues:    Chief Complaint   Patient presents with     RECHECK     pain     Discuss     medication changes        Chronic Pain Follow-Up       Type / Location of Pain: Head/legs  Analgesia/pain control:       Recent changes:  worse      Overall control: Inadequate pain control  Activity level/function:      Daily activities:  Unable to perform most daily activities - chores, hobbies, social activities, driving    Work:  Able to work part time with limitations  Adverse effects:  No  Adherance    Taking medication as directed?  Yes    Participating in other treatments: no -   Risk Factors:    Sleep:  Fair    Mood/anxiety:  controlled    Recent family or social stressors:  none noted    Other aggravating factors: running out of pain medication  PHQ-9 SCORE 11/17/2016 12/16/2016 7/14/2017   Total Score - - -   Total Score 1 5 0     LAUREN-7 SCORE 2/9/2015 3/24/2015 12/16/2016   Total Score 0 2 -   Total Score - - 0     Encounter-Level CSA:     There are no encounter-level csa.            Amount of exercise or physical activity: 2-3 days/week for an average of 30-45 minutes    Problems taking medications regularly: No    Medication side effects: none  Diet: regular (no restrictions)        ROS:  Constitutional, HEENT, cardiovascular, pulmonary, gi and gu systems are negative, except as otherwise noted.      OBJECTIVE:                                                    /86 (BP Location: Left arm, Patient Position: Chair, Cuff Size: Adult Regular)  Pulse 89  Temp 97.7  F (36.5  C) (Temporal)  Resp 20  Wt 247 lb 12.8 oz (112.4 kg)  SpO2 96%  BMI 36.59 kg/m2  Body mass index is 36.59 kg/(m^2).    GENERAL: healthy, alert and no distress  NECK: no adenopathy, no asymmetry, masses, or scars and thyroid normal to palpation  RESP: lungs clear to auscultation - no  rales, rhonchi or wheezes  CV: regular rate and rhythm, normal S1 S2, no S3 or S4, no murmur, click or rub, no peripheral edema and peripheral pulses strong  ABDOMEN: soft, nontender, no hepatosplenomegaly, no masses and bowel sounds normal  MS: no gross musculoskeletal defects noted, no edema    Diagnostic Test Results:  none      ASSESSMENT/PLAN:                                                        ICD-10-CM    1. Chronic pain syndrome G89.4 amitriptyline (ELAVIL) 10 MG tablet   2. Neuropathy (H) G62.9 oxyCODONE (ROXICODONE) 5 MG IR tablet       Uncontrolled chronic pain. Short-term escalation in his oxycodone to up to 3 tablets a day for the next month. We'll also add amitriptyline for his facial neuropathy. He will undergo peripheral arterial disease testing with [facility], although I suspect it's not vascular. May also BE neuropathic may be from the back. More of a neurogenic claudication symptoms. Follow up with us after his testing in one month.    Gonzalo Cole MD  [facility]

## 2017-08-01 NOTE — NURSING NOTE
"Chief Complaint   Patient presents with     RECHECK     pain     Discuss     medication changes       Initial /86 (BP Location: Left arm, Patient Position: Chair, Cuff Size: Adult Regular)  Pulse 89  Temp 97.7  F (36.5  C) (Temporal)  Resp 20  Wt 247 lb 12.8 oz (112.4 kg)  SpO2 96%  BMI 36.59 kg/m2 Estimated body mass index is 36.59 kg/(m^2) as calculated from the following:    Height as of 7/20/17: 5' 9\" (1.753 m).    Weight as of this encounter: 247 lb 12.8 oz (112.4 kg).  Medication Reconciliation: complete   Luiza Austin CMA     "

## 2017-08-01 NOTE — TELEPHONE ENCOUNTER
Left message with patient at 1:35 this afternoon that we have him scheduled to see Dr. Wallace on 8/9 at 3:00 in [place] at the heart clinic. I asked that he call back to confirm receipt of this message. He is being seen for chest pain and Dr. Wallace agreed to see him at his next available clinic. [facility]    Message  Received: Today       Amish Wallace MD  [facility] Heart Team 3                   Got a note from Dr Moran [place])   About this vishal and recurring CP...    I'll see him if I can.    i'll bet his cp is anxiety driven but he'll need to be assessed.  Sai

## 2017-08-02 PROBLEM — R07.9 CHEST PAIN: Status: RESOLVED | Noted: 2017-04-11 | Resolved: 2017-08-02

## 2017-08-09 ENCOUNTER — OFFICE VISIT (OUTPATIENT)
Dept: CARDIOLOGY | Facility: CLINIC | Age: 47
End: 2017-08-09
Payer: COMMERCIAL

## 2017-08-09 VITALS
HEART RATE: 74 BPM | WEIGHT: 245.1 LBS | SYSTOLIC BLOOD PRESSURE: 122 MMHG | DIASTOLIC BLOOD PRESSURE: 80 MMHG | HEIGHT: 69 IN | BODY MASS INDEX: 36.3 KG/M2 | OXYGEN SATURATION: 95 %

## 2017-08-09 DIAGNOSIS — I25.110 CORONARY ARTERY DISEASE INVOLVING NATIVE CORONARY ARTERY OF NATIVE HEART WITH UNSTABLE ANGINA PECTORIS (H): ICD-10-CM

## 2017-08-09 DIAGNOSIS — E78.2 MIXED HYPERLIPIDEMIA: Primary | ICD-10-CM

## 2017-08-09 DIAGNOSIS — G62.9 NEUROPATHY: ICD-10-CM

## 2017-08-09 PROCEDURE — 99214 OFFICE O/P EST MOD 30 MIN: CPT | Performed by: INTERNAL MEDICINE

## 2017-08-09 RX ORDER — ROSUVASTATIN CALCIUM 5 MG/1
5 TABLET, COATED ORAL DAILY
Qty: 30 TABLET | Refills: 11 | Status: SHIPPED | OUTPATIENT
Start: 2017-08-09 | End: 2017-08-22

## 2017-08-09 RX ORDER — NITROGLYCERIN 0.4 MG/1
TABLET SUBLINGUAL
Qty: 25 TABLET | Refills: 3 | Status: ON HOLD | OUTPATIENT
Start: 2017-08-09 | End: 2019-10-21

## 2017-08-09 NOTE — LETTER
8/9/2017    Gonzalo Cole MD  [address] [place] [address]    RE: Cecil Pedro       Dear Colleague,    I had the pleasure of seeing Cecil Vasquez in the [facility].    I got together with Cecil Vasquez today.  He is 46.  He has been chronically overweight and has had a strong family history of coronary artery disease with both mother and father who had CAD.  This also expressed himself in Cecil's life, having had coronary angiography in March of this year with a 99% stenosis of his LAD which was dilated and stented with a drug-eluting stent and noncritical coronary disease in the left circumflex and RCA.      His lipid profiles have been chronically bad.  He has had LDLs in the 180-207 range.  Triglycerides as high as 2000.  With his coronary disease we attacked his lipids.  He was rather aggressively put on Crestor 40 mg a day, which was reasonable but it led to severe bilateral calf tightness and pain.  Now 3 weeks off of Crestor his calves feel normal and they feel somewhat better.  He also was on Zetia with the Crestor.  This was stopped for a while but now it has been resumed at 10 mg a day.      He has had nothing to suggest angina, although he has had atypical focal stabbing grabbing fleeting shooting sharp chest discomforts, none of which sound cardiac or anginal in origin.  To support that clinical impression, he also did a stress nuclear study 04/12/2017 which showed an EF of 70% with no scars, no ischemia/infarcts.      CURRENT MEDICATIONS:   1.  Zetia 10 mg a day.   2.  Aspirin 81 mg a day.   3.  Neurontin 1800 mg 3 times a day.   4.  Ultram 50 mg a day.   5.  Norvasc 5 mg a day.   6.  Elavil 10 mg daily.   7.  Lisinopril 10 mg a day.   8.  Effient 10 mg daily.      PHYSICAL EXAMINATION:   GENERAL:  Well-developed, well-nourished, overweight male weighing 245 pounds, heart rate 60 beats per minute.   VITAL SIGNS:  Blood pressure 120/80.   HEAD:   Normal.   NECK:  Free of neck vein distention or bruit.   HEART:  Regular without gallop or murmur.   LUNGS:  Clear.   ABDOMEN:  Soft, obese, nontender without pain or mass.   EXTREMITIES:  Free of edema.  Pedal pulses +2.      I spent a lot of time discussing the needs for diet, weight loss, exercise and improved fitness in this otherwise well relatively young man.  His blood pressure is normal.  His lipid profile still needs some work.  I discussed abstinence of carbohydrates to get his triglycerides down and a regular pattern of walking and gentle exercise.  Should he continue to run high above 150-200, I would add fenofibrate 134-160 mg daily.      I started him on Crestor 5 mg at bedtime and will see if he can tolerate a much lower dose.  I would go to every other night or every third if I have to in hopes of keeping him on a statin.      He may need to be on PCSK9 injectable medications such as Repatha to lower his LDL, but hopefully we can avoid that.      IMPRESSION:   1.  Chronic mixed hyperlipidemia with elevated triglycerides and LDL.   2.  Possibly calf related pain and statin intolerance, but that was at 40 mg of rosuvastatin a day.  I started rosuvastatin 5 mg at bedtime.   3.  Chronically overweight.   4.  Post LAD stenting for a symptomatic pattern with angina.   5.  Atypical chest pains more recently.   6.  Normal stress nuclear study as noted above with an EF that is excellent and no scars or ischemia.        PLAN:  I asked to see him in 3 months.  I asked him to lose 20 pounds of weight.  I encouraged him to minimize his carbohydrates and we will see how he does.  Weight loss would also help with blood pressure management, etc.  We may need to adjust his medicines subsequently.      IMPRESSION:   1.  Hypertension, controlled.   2.  Mixed hyperlipidemia as above.   3.  Coronary artery disease, currently asymptomatic.   4.  Chronic obesity.   5.  Hypertension.      PLAN:  As above.     Again, thank  you for allowing me to participate in the care of your patient.      Sincerely,    DWIGHT NATH MD     [facility]

## 2017-08-09 NOTE — TELEPHONE ENCOUNTER
The pharmacy does not have the medication he was prescribed and they say it will be 3 to 4 days before he gets it.  Yves Liberty.  Please call to let him know what he should do.  [phone]

## 2017-08-09 NOTE — LETTER
8/9/2017      RE: Cecil Vasquez  [address]   [facility] [address]       Dear Colleague,    Thank you for the opportunity to participate in the care of your patient, Cecil Vasquez, at the [facility]. Please see a copy of my visit note below.    HPI and Plan:   See dictation        No orders of the defined types were placed in this encounter.    No orders of the defined types were placed in this encounter.    There are no discontinued medications.      Encounter Diagnoses   Name Primary?     Coronary artery disease involving native coronary artery of native heart with unstable angina pectoris (H)      Mixed hyperlipidemia Yes       CURRENT MEDICATIONS:  Current Outpatient Prescriptions   Medication Sig Dispense Refill     amitriptyline (ELAVIL) 10 MG tablet Start at 1 tab at bedtime for 3 days, then 2 tabs at bedtime for 3 days, then 3 tabs at bedtime.  Plan to increase dose to 50-75 mg at bedtime after 1 month 90 tablet 0     oxyCODONE (ROXICODONE) 5 MG IR tablet Take 1-2 tablets (5-10 mg) by mouth every 6 hours as needed for pain Max 3 per day 70 tablet 0     amLODIPine (NORVASC) 5 MG tablet Take 1 tablet (5 mg) by mouth daily 30 tablet 11     traMADol (ULTRAM) 50 MG tablet Take 1-2 tablets ( mg) by mouth 3 times daily as needed for moderate pain 180 tablet 0     melatonin 3 MG tablet TAKE ONE TABLET BY MOUTH NIGHTLY AS NEEDED FOR SLEEP 30 tablet 1     gabapentin (NEURONTIN) 600 MG tablet Take 3 tablets (1,800 mg) by mouth 3 times daily 240 tablet 1     nitroglycerin (NITROSTAT) 0.4 MG sublingual tablet For chest pain place 1 tablet under the tongue every 5 minutes for 3 doses. If symptoms persist 5 minutes after 1st dose call 911. 25 tablet 3     aspirin 81 MG EC tablet Take 1 tablet (81 mg) by mouth daily 90 tablet 0     MAPAP 325 MG tablet TAKE TWO TABLETS BY MOUTH EVERY 4 HOURS AS NEEDED FOR MILD PAIN 100 tablet 11      lisinopril (PRINIVIL/ZESTRIL) 10 MG tablet Take 1 tablet (10 mg) by mouth daily 90 tablet 3     prasugrel (EFFIENT) 10 MG TABS tablet Take 1 tablet (10 mg) by mouth daily 90 tablet 3     VENTOLIN  (90 BASE) MCG/ACT Inhaler INHALE 2 PUFFS INTO THE LUNGS EVERY 4 HOURS AS NEEDED FOR SHORTNESS OF BREATH OR DIFFICULTY BREATHING 18 g 0     ipratropium - albuterol 0.5 mg/2.5 mg/3 mL (DUONEB) 0.5-2.5 (3) MG/3ML neb solution Take 1 vial (3 mLs) by nebulization every 4 hours as needed for shortness of breath / dyspnea 30 vial 0     ezetimibe (ZETIA) 10 MG tablet Take 1 tablet (10 mg) by mouth daily (Patient not taking: Reported on 7/20/2017) 90 tablet 3       ALLERGIES     Allergies   Allergen Reactions     No Known Drug Allergies        PAST MEDICAL HISTORY:  Past Medical History:   Diagnosis Date     ADHD (attention deficit hyperactivity disorder)      Bipolar 1 disorder (H)      Depression      Gastro-oesophageal reflux disease      Hyperlipidaemia      Hypertension      Impaired fasting glucose      Nephrolithiasis      Obesity      Reactive airway disease     uses albuterol inhaler when has cold     Renal calcification      Rosacea      Tobacco abuse      Trigeminal neuralgia      Uncomplicated asthma        PAST SURGICAL HISTORY:  Past Surgical History:   Procedure Laterality Date     COMBINED CYSTOSCOPY, INSERT CATHETER URETER Left 3/25/2016    Procedure: COMBINED CYSTOSCOPY, INSERT CATHETER URETER;  Surgeon: Jorden Villafana MD;  Location:  OR     COMBINED CYSTOSCOPY, RETROGRADES, URETEROSCOPY, LASER HOLMIUM LITHOTRIPSY URETER(S), INSERT STENT Left 4/13/2016    Procedure: COMBINED CYSTOSCOPY, RETROGRADES, URETEROSCOPY, LASER HOLMIUM LITHOTRIPSY URETER(S), INSERT STENT;  Surgeon: Jroden Villafana MD;  Location:  OR     EXTRACORPOREAL SHOCK WAVE LITHOTRIPSY (ESWL) Left 3/25/2016    Procedure: EXTRACORPOREAL SHOCK WAVE LITHOTRIPSY (ESWL);  Surgeon: Jorden Villafana MD;  Location:  OR      EXTRACORPOREAL SHOCK WAVE LITHOTRIPSY, CYSTOSCOPY, INSERT STENT URETER(S), COMBINED Left 3/25/2016    Procedure: COMBINED EXTRACORPOREAL SHOCK WAVE LITHOTRIPSY, CYSTOSCOPY, INSERT STENT URETER(S);  Surgeon: Jorden Villafana MD;  Location:  OR     GENITOURINARY SURGERY  3 25 2016     Lithotripsy and stent placement     TONSILLECTOMY         FAMILY HISTORY:  Family History   Problem Relation Age of Onset     Psychotic Disorder Mother      DIABETES Sister      Psychotic Disorder Sister        SOCIAL HISTORY:  Social History     Social History     Marital status:      Spouse name: N/A     Number of children: N/A     Years of education: N/A     Social History Main Topics     Smoking status: Former Smoker     Packs/day: 0.50     Types: Cigarettes     Smokeless tobacco: Never Used     Alcohol use No     Drug use: No     Sexual activity: Not Currently     Other Topics Concern      Service No     Blood Transfusions No     Caffeine Concern No     Occupational Exposure No     Hobby Hazards No     Sleep Concern Yes     Stress Concern No     Weight Concern Yes     Special Diet No     low sodium     Back Care No     Exercise No     Seat Belt Yes     Self-Exams Yes     Social History Narrative        3 children    Working at [employer]         Review of Systems:  Skin:  Negative       Eyes:  Negative      ENT:  Negative      Respiratory:  Negative       Cardiovascular:  Negative for;palpitations;lightheadedness;dizziness Positive for;chest pain little twinges in chest, like a knife feeling with pressure behind it, no pattern to it , can happen when sitting and or with exertion, will last about 30-60sec. can happen about 1-2 per day and about 1-2 per week, will take nitro not sure if it helps  the discomfort because it goes away so quickly  Gastroenterology: Negative      Genitourinary:  Negative      Musculoskeletal:  Positive for foot pain still has pain in both legs but is a little better with  "compression stockings on  Neurologic:  Negative      Psychiatric:  Positive for sleep disturbances takes sleeping aid  Heme/Lymph/Imm:  Negative      Endocrine:  Negative        Physical Exam:  Vitals: /80 (BP Location: Left arm, Patient Position: Fowlers, Cuff Size: Adult Large)  Pulse 74  Ht 1.753 m (5' 9\")  Wt 111.2 kg (245 lb 1.6 oz)  SpO2 95%  BMI 36.19 kg/m2    Constitutional:  cooperative, alert and oriented, well developed, well nourished, in no acute distress overweight      Skin:  warm and dry to the touch        Head:  normocephalic        Eyes:  pupils equal and round;sclera white        ENT:  no pallor or cyanosis        Neck:  carotid pulses are full and equal bilaterally;JVP normal;no carotid bruit        Chest:  clear to auscultation          Cardiac: regular rhythm;no murmurs, gallops or rubs detected                  Abdomen:  abdomen soft;no masses;non-tender obese      Vascular: pulses full and equal;pulses full and equal, no bruits auscultated                                        Extremities and Back:  no deformities, clubbing, cyanosis, erythema observed;no edema              Neurological:  affect appropriate, oriented to time, person and place;no gross motor deficits          Recent Lab Results:  LIPID RESULTS:  Lab Results   Component Value Date    CHOL 181 05/18/2017    HDL 39 (L) 05/18/2017    LDL  05/18/2017     Cannot estimate LDL when triglyceride exceeds 400 mg/dL    LDL 77 05/18/2017    TRIG 511 (H) 05/18/2017    CHOLHDLRATIO 6.5 (H) 01/13/2015       LIVER ENZYME RESULTS:  Lab Results   Component Value Date    AST 18 06/04/2017    ALT 31 06/04/2017       CBC RESULTS:  Lab Results   Component Value Date    WBC 8.7 06/04/2017    RBC 4.42 06/04/2017    HGB 12.5 (L) 06/04/2017    HCT 39.1 (L) 06/04/2017    MCV 89 06/04/2017    MCH 28.3 06/04/2017    MCHC 32.0 06/04/2017    RDW 12.9 06/04/2017     06/04/2017       BMP RESULTS:  Lab Results   Component Value Date    NA " 137 07/20/2017    POTASSIUM 4.2 07/20/2017    CHLORIDE 102 07/20/2017    CO2 24 07/20/2017    ANIONGAP 11 07/20/2017     (H) 07/20/2017    BUN 23 07/20/2017    CR 0.86 07/20/2017    GFRESTIMATED >90  Non  GFR Calc   07/20/2017    GFRESTBLACK >90   GFR Calc   07/20/2017    LÁZARO 8.5 07/20/2017        A1C RESULTS:  Lab Results   Component Value Date    A1C 6.0 07/20/2017       INR RESULTS:  No results found for: INR        CC  No referring provider defined for this encounter.                    HISTORY OF PRESENT ILLNESS:  I got together with Cecil Vasquez today.  He is 46.  He has been chronically overweight and has had a strong family history of coronary artery disease with both mother and father who had CAD.  This also expressed himself in Cecil's life, having had coronary angiography in March of this year with a 99% stenosis of his LAD which was dilated and stented with a drug-eluting stent and noncritical coronary disease in the left circumflex and RCA.      His lipid profiles have been chronically bad.  He has had LDLs in the 180-207 range.  Triglycerides as high as 2000.  With his coronary disease we attacked his lipids.  He was rather aggressively put on Crestor 40 mg a day, which was reasonable but it led to severe bilateral calf tightness and pain.  Now 3 weeks off of Crestor his calves feel normal and they feel somewhat better.  He also was on Zetia with the Crestor.  This was stopped for a while but now it has been resumed at 10 mg a day.      He has had nothing to suggest angina, although he has had atypical focal stabbing grabbing fleeting shooting sharp chest discomforts, none of which sound cardiac or anginal in origin.  To support that clinical impression, he also did a stress nuclear study 04/12/2017 which showed an EF of 70% with no scars, no ischemia/infarcts.      CURRENT MEDICATIONS:   1.  Zetia 10 mg a day.   2.  Aspirin 81 mg a day.   3.  Neurontin 1800  mg 3 times a day.   4.  Ultram 50 mg a day.   5.  Norvasc 5 mg a day.   6.  Elavil 10 mg daily.   7.  Lisinopril 10 mg a day.   8.  Effient 10 mg daily.      PHYSICAL EXAMINATION:   GENERAL:  Well-developed, well-nourished, overweight male weighing 245 pounds, heart rate 60 beats per minute.   VITAL SIGNS:  Blood pressure 120/80.   HEAD:  Normal.   NECK:  Free of neck vein distention or bruit.   HEART:  Regular without gallop or murmur.   LUNGS:  Clear.   ABDOMEN:  Soft, obese, nontender without pain or mass.   EXTREMITIES:  Free of edema.  Pedal pulses +2.      I spent a lot of time discussing the needs for diet, weight loss, exercise and improved fitness in this otherwise well relatively young man.  His blood pressure is normal.  His lipid profile still needs some work.  I discussed abstinence of carbohydrates to get his triglycerides down and a regular pattern of walking and gentle exercise.  Should he continue to run high above 150-200, I would add fenofibrate 134-160 mg daily.      I started him on Crestor 5 mg at bedtime and will see if he can tolerate a much lower dose.  I would go to every other night or every third if I have to in hopes of keeping him on a statin.      He may need to be on PCSK9 injectable medications such as Repatha to lower his LDL, but hopefully we can avoid that.      IMPRESSION:   1.  Chronic mixed hyperlipidemia with elevated triglycerides and LDL.   2.  Possibly calf related pain and statin intolerance, but that was at 40 mg of rosuvastatin a day.  I started rosuvastatin 5 mg at bedtime.   3.  Chronically overweight.   4.  Post LAD stenting for a symptomatic pattern with angina.   5.  Atypical chest pains more recently.   6.  Normal stress nuclear study as noted above with an EF that is excellent and no scars or ischemia.        PLAN:  I asked to see him in 3 months.  I asked him to lose 20 pounds of weight.  I encouraged him to minimize his carbohydrates and we will see how he does.   Weight loss would also help with blood pressure management, etc.  We may need to adjust his medicines subsequently.      IMPRESSION:   1.  Hypertension, controlled.   2.  Mixed hyperlipidemia as above.   3.  Coronary artery disease, currently asymptomatic.   4.  Chronic obesity.   5.  Hypertension.      PLAN:  As above.      cc:   Gonzalo Cole MD    [address]  [address]         DWIGHT NATH MD, FACC

## 2017-08-09 NOTE — TELEPHONE ENCOUNTER
Gabapentin       Last Written Prescription Date:  6/16/2017  Last Fill Quantity: 240,   # refills: 1  Last Office Visit with [facility], [facility] or [facility] prescribing provider: 8/1/2017  Future Office visit:    Next 5 appointments (look out 90 days)     Aug 22, 2017  9:00 AM CDT   [facility]    [address] [place] [id number]   [phone]            Aug 31, 2017 10:40 AM CDT   Return Visit with Rose Durham PA-C   [facility]    [address] [place] [id number]   [phone]                   Routing refill request to provider for review/approval because:  Drug not on the [facility], [facility] or [facility] refill protocol or controlled substance

## 2017-08-09 NOTE — MR AVS SNAPSHOT
After Visit Summary   8/9/2017    Cecil Vasquez    MRN: [id number]           Patient Information     Date Of Birth          1970        Visit Information        Provider Department      8/9/2017 3:00 PM Amish Wallace MD [facility]        Today's Diagnoses     Mixed hyperlipidemia    -  1    Coronary artery disease involving native coronary artery of native heart with unstable angina pectoris (H)           Follow-ups after your visit        Your next 10 appointments already scheduled     Aug 10, 2017 10:30 AM CDT   US LOWER EXTREMITY ARTERIAL DOPPLER SEG PRESSURES W EXER with PHUS3   [facility] ([facility])    [address] [place] [id number]   [phone]           Please bring a list of your medicines (including vitamins, minerals and over-the-counter drugs). Also, tell your doctor about any allergies you may have. Wear comfortable clothes and leave your valuables at home.  No caffeine or tobacco for 1 hour prior to exam.  Please call the Imaging Department at your exam site with any questions.            Aug 22, 2017  9:00 AM CDT   Office Visit with Gonzalo Cole MD   [facility]    [address] [place] [id number] [phone]           Bring a current list of meds and any records pertaining to this visit. For Physicals, please bring immunization records and any forms needing to be filled out. Please arrive 10 minutes early to complete paperwork.            Aug 31, 2017 10:40 AM CDT   Return Visit with Rose Durham PA-C   [facility])    [address] [place] [id number] [phone]            Oct 18, 2017  1:00 PM CDT   New Visit with Andrea Starr LP   [facility] ([facility] [place])    [address]  [place] [address] [phone]             "Oct 18, 2017  2:00 PM CDT   New Visit with CIRO Wilhelm CNP   [facility] [place] ([facility] [place])    [address]  [place] [address] [phone]              Who to contact     If you have questions or need follow up information about today's clinic visit or your schedule please contact [facility] directly at [phone].  Normal or non-critical lab and imaging results will be communicated to you by [employer], letter or phone within 4 business days after the clinic has received the results. If you do not hear from us within 7 days, please contact the clinic through [employer] or phone. If you have a critical or abnormal lab result, we will notify you by phone as soon as possible.  Submit refill requests through [employer] or call your pharmacy and they will forward the refill request to us. Please allow 3 business days for your refill to be completed.          Additional Information About Your Visit        Bubbleball Information     [employer]ball lets you send messages to your doctor, view your test results, renew your prescriptions, schedule appointments and more. To sign up, go to [email or web address] . Click on \"Log in\" on the left side of the screen, which will take you to the Welcome page. Then click on \"Sign up Now\" on the right side of the page.     You will be asked to enter the access code listed below, as well as some personal information. Please follow the directions to create your username and password.     Your access code is: [email or web address]  Expires: 10/18/2017 11:03 AM     Your access code will  in 90 days. If you need help or a new code, please call your [facility] clinic or [phone].        Care EveryWhere ID     This is your Care EveryWhere ID. This could be used by other organizations to access your [facility] medical records  [id number]        Your Vitals Were     Pulse Height Pulse Oximetry BMI (Body Mass Index)          74 1.753 m " "(5' 9\") 95% 36.19 kg/m2         Blood Pressure from Last 3 Encounters:   08/09/17 122/80   08/01/17 128/86   07/20/17 126/90    Weight from Last 3 Encounters:   08/09/17 111.2 kg (245 lb 1.6 oz)   08/01/17 112.4 kg (247 lb 12.8 oz)   07/20/17 113.9 kg (251 lb)              Today, you had the following     No orders found for display       Primary Care Provider Office Phone # Fax #    Gonzalo Cole [phone] [address] [place] [address]        Equal Access to Services     [facility] : Hadii jeronimo he Somathew, waaxda luqadaha, qaybta kaalmada adetaylorda, david reynoso. So [facility] [phone].    ATENCIÓN: Si habla español, tiene a maynard disposición servicios gratuitos de asistencia lingüística. Llame al [phone].    We comply with applicable federal civil rights laws and [place] laws. We do not discriminate on the basis of race, color, national origin, age, disability sex, sexual orientation or gender identity.            Thank you!     Thank you for choosing [facility]  for your care. Our goal is always to provide you with excellent care. Hearing back from our patients is one way we can continue to improve our services. Please take a few minutes to complete the written survey that you may receive in the mail after your visit with us. Thank you!             Your Updated Medication List - Protect others around you: Learn how to safely use, store and throw away your medicines at www.disposemymeds.org.          This list is accurate as of: 8/9/17  3:43 PM.  Always use your most recent med list.                   Brand Name Dispense Instructions for use Diagnosis    amitriptyline 10 MG tablet    ELAVIL    90 tablet    Start at 1 tab at bedtime for 3 days, then 2 tabs at bedtime for 3 days, then 3 tabs at bedtime.  Plan to increase dose to 50-75 mg at bedtime after 1 month    Chronic pain syndrome       amLODIPine 5 MG tablet    " NORVASC    30 tablet    Take 1 tablet (5 mg) by mouth daily    Benign essential HTN       aspirin 81 MG EC tablet     90 tablet    Take 1 tablet (81 mg) by mouth daily    Coronary artery disease involving native coronary artery of native heart with unstable angina pectoris (H)       ezetimibe 10 MG tablet    ZETIA    90 tablet    Take 1 tablet (10 mg) by mouth daily    Hyperlipidemia LDL goal <70       gabapentin 600 MG tablet    NEURONTIN    240 tablet    Take 3 tablets (1,800 mg) by mouth 3 times daily    Neuropathy (H)       ipratropium - albuterol 0.5 mg/2.5 mg/3 mL 0.5-2.5 (3) MG/3ML neb solution    DUONEB    30 vial    Take 1 vial (3 mLs) by nebulization every 4 hours as needed for shortness of breath / dyspnea    Asthma with acute exacerbation, unspecified asthma severity       lisinopril 10 MG tablet    PRINIVIL/ZESTRIL    90 tablet    Take 1 tablet (10 mg) by mouth daily    Essential hypertension with goal blood pressure less than 140/90, ACS (acute coronary syndrome) (H)       MAPAP 325 MG tablet   Generic drug:  acetaminophen     100 tablet    TAKE TWO TABLETS BY MOUTH EVERY 4 HOURS AS NEEDED FOR MILD PAIN    Trigeminal neuralgia       melatonin 3 MG tablet     30 tablet    TAKE ONE TABLET BY MOUTH NIGHTLY AS NEEDED FOR SLEEP    Trigeminal neuralgia       nitroGLYcerin 0.4 MG sublingual tablet    NITROSTAT    25 tablet    For chest pain place 1 tablet under the tongue every 5 minutes for 3 doses. If symptoms persist 5 minutes after 1st dose call 911.    Coronary artery disease involving native coronary artery of native heart with unstable angina pectoris (H)       oxyCODONE 5 MG IR tablet    ROXICODONE    70 tablet    Take 1-2 tablets (5-10 mg) by mouth every 6 hours as needed for pain Max 3 per day    Neuropathy (H)       prasugrel 10 MG Tabs tablet    EFFIENT    90 tablet    Take 1 tablet (10 mg) by mouth daily    ACS (acute coronary syndrome) (H), Coronary artery disease involving native coronary  artery of native heart with unstable angina pectoris (H)       traMADol 50 MG tablet    ULTRAM    180 tablet    Take 1-2 tablets ( mg) by mouth 3 times daily as needed for moderate pain    Neuropathy (H)       VENTOLIN  (90 BASE) MCG/ACT Inhaler   Generic drug:  albuterol     18 g    INHALE 2 PUFFS INTO THE LUNGS EVERY 4 HOURS AS NEEDED FOR SHORTNESS OF BREATH OR DIFFICULTY BREATHING    Asthma with acute exacerbation, unspecified asthma severity

## 2017-08-09 NOTE — PROGRESS NOTES
HPI and Plan:   See dictation        No orders of the defined types were placed in this encounter.    No orders of the defined types were placed in this encounter.    There are no discontinued medications.      Encounter Diagnoses   Name Primary?     Coronary artery disease involving native coronary artery of native heart with unstable angina pectoris (H)      Mixed hyperlipidemia Yes       CURRENT MEDICATIONS:  Current Outpatient Prescriptions   Medication Sig Dispense Refill     amitriptyline (ELAVIL) 10 MG tablet Start at 1 tab at bedtime for 3 days, then 2 tabs at bedtime for 3 days, then 3 tabs at bedtime.  Plan to increase dose to 50-75 mg at bedtime after 1 month 90 tablet 0     oxyCODONE (ROXICODONE) 5 MG IR tablet Take 1-2 tablets (5-10 mg) by mouth every 6 hours as needed for pain Max 3 per day 70 tablet 0     amLODIPine (NORVASC) 5 MG tablet Take 1 tablet (5 mg) by mouth daily 30 tablet 11     traMADol (ULTRAM) 50 MG tablet Take 1-2 tablets ( mg) by mouth 3 times daily as needed for moderate pain 180 tablet 0     melatonin 3 MG tablet TAKE ONE TABLET BY MOUTH NIGHTLY AS NEEDED FOR SLEEP 30 tablet 1     gabapentin (NEURONTIN) 600 MG tablet Take 3 tablets (1,800 mg) by mouth 3 times daily 240 tablet 1     nitroglycerin (NITROSTAT) 0.4 MG sublingual tablet For chest pain place 1 tablet under the tongue every 5 minutes for 3 doses. If symptoms persist 5 minutes after 1st dose call 911. 25 tablet 3     aspirin 81 MG EC tablet Take 1 tablet (81 mg) by mouth daily 90 tablet 0     MAPAP 325 MG tablet TAKE TWO TABLETS BY MOUTH EVERY 4 HOURS AS NEEDED FOR MILD PAIN 100 tablet 11     lisinopril (PRINIVIL/ZESTRIL) 10 MG tablet Take 1 tablet (10 mg) by mouth daily 90 tablet 3     prasugrel (EFFIENT) 10 MG TABS tablet Take 1 tablet (10 mg) by mouth daily 90 tablet 3     VENTOLIN  (90 BASE) MCG/ACT Inhaler INHALE 2 PUFFS INTO THE LUNGS EVERY 4 HOURS AS NEEDED FOR SHORTNESS OF BREATH OR DIFFICULTY BREATHING  18 g 0     ipratropium - albuterol 0.5 mg/2.5 mg/3 mL (DUONEB) 0.5-2.5 (3) MG/3ML neb solution Take 1 vial (3 mLs) by nebulization every 4 hours as needed for shortness of breath / dyspnea 30 vial 0     ezetimibe (ZETIA) 10 MG tablet Take 1 tablet (10 mg) by mouth daily (Patient not taking: Reported on 7/20/2017) 90 tablet 3       ALLERGIES     Allergies   Allergen Reactions     No Known Drug Allergies        PAST MEDICAL HISTORY:  Past Medical History:   Diagnosis Date     ADHD (attention deficit hyperactivity disorder)      Bipolar 1 disorder (H)      Depression      Gastro-oesophageal reflux disease      Hyperlipidaemia      Hypertension      Impaired fasting glucose      Nephrolithiasis      Obesity      Reactive airway disease     uses albuterol inhaler when has cold     Renal calcification      Rosacea      Tobacco abuse      Trigeminal neuralgia      Uncomplicated asthma        PAST SURGICAL HISTORY:  Past Surgical History:   Procedure Laterality Date     COMBINED CYSTOSCOPY, INSERT CATHETER URETER Left 3/25/2016    Procedure: COMBINED CYSTOSCOPY, INSERT CATHETER URETER;  Surgeon: Jorden Villafana MD;  Location:  OR     COMBINED CYSTOSCOPY, RETROGRADES, URETEROSCOPY, LASER HOLMIUM LITHOTRIPSY URETER(S), INSERT STENT Left 4/13/2016    Procedure: COMBINED CYSTOSCOPY, RETROGRADES, URETEROSCOPY, LASER HOLMIUM LITHOTRIPSY URETER(S), INSERT STENT;  Surgeon: Jorden Villafana MD;  Location:  OR     EXTRACORPOREAL SHOCK WAVE LITHOTRIPSY (ESWL) Left 3/25/2016    Procedure: EXTRACORPOREAL SHOCK WAVE LITHOTRIPSY (ESWL);  Surgeon: Jorden Villafana MD;  Location:  OR     EXTRACORPOREAL SHOCK WAVE LITHOTRIPSY, CYSTOSCOPY, INSERT STENT URETER(S), COMBINED Left 3/25/2016    Procedure: COMBINED EXTRACORPOREAL SHOCK WAVE LITHOTRIPSY, CYSTOSCOPY, INSERT STENT URETER(S);  Surgeon: Jorden Villafana MD;  Location:  OR     GENITOURINARY SURGERY  3 25 2016     Lithotripsy and stent placement      "TONSILLECTOMY         FAMILY HISTORY:  Family History   Problem Relation Age of Onset     Psychotic Disorder Mother      DIABETES Sister      Psychotic Disorder Sister        SOCIAL HISTORY:  Social History     Social History     Marital status:      Spouse name: N/A     Number of children: N/A     Years of education: N/A     Social History Main Topics     Smoking status: Former Smoker     Packs/day: 0.50     Types: Cigarettes     Smokeless tobacco: Never Used     Alcohol use No     Drug use: No     Sexual activity: Not Currently     Other Topics Concern      Service No     Blood Transfusions No     Caffeine Concern No     Occupational Exposure No     Hobby Hazards No     Sleep Concern Yes     Stress Concern No     Weight Concern Yes     Special Diet No     low sodium     Back Care No     Exercise No     Seat Belt Yes     Self-Exams Yes     Social History Narrative        3 children    Working at [employer]         Review of Systems:  Skin:  Negative       Eyes:  Negative      ENT:  Negative      Respiratory:  Negative       Cardiovascular:  Negative for;palpitations;lightheadedness;dizziness Positive for;chest pain little twinges in chest, like a knife feeling with pressure behind it, no pattern to it , can happen when sitting and or with exertion, will last about 30-60sec. can happen about 1-2 per day and about 1-2 per week, will take nitro not sure if it helps  the discomfort because it goes away so quickly  Gastroenterology: Negative      Genitourinary:  Negative      Musculoskeletal:  Positive for foot pain still has pain in both legs but is a little better with compression stockings on  Neurologic:  Negative      Psychiatric:  Positive for sleep disturbances takes sleeping aid  Heme/Lymph/Imm:  Negative      Endocrine:  Negative        Physical Exam:  Vitals: /80 (BP Location: Left arm, Patient Position: Fowlers, Cuff Size: Adult Large)  Pulse 74  Ht 1.753 m (5' 9\")  Wt 111.2 " kg (245 lb 1.6 oz)  SpO2 95%  BMI 36.19 kg/m2    Constitutional:  cooperative, alert and oriented, well developed, well nourished, in no acute distress overweight      Skin:  warm and dry to the touch        Head:  normocephalic        Eyes:  pupils equal and round;sclera white        ENT:  no pallor or cyanosis        Neck:  carotid pulses are full and equal bilaterally;JVP normal;no carotid bruit        Chest:  clear to auscultation          Cardiac: regular rhythm;no murmurs, gallops or rubs detected                  Abdomen:  abdomen soft;no masses;non-tender obese      Vascular: pulses full and equal;pulses full and equal, no bruits auscultated                                        Extremities and Back:  no deformities, clubbing, cyanosis, erythema observed;no edema              Neurological:  affect appropriate, oriented to time, person and place;no gross motor deficits          Recent Lab Results:  LIPID RESULTS:  Lab Results   Component Value Date    CHOL 181 05/18/2017    HDL 39 (L) 05/18/2017    LDL  05/18/2017     Cannot estimate LDL when triglyceride exceeds 400 mg/dL    LDL 77 05/18/2017    TRIG 511 (H) 05/18/2017    CHOLHDLRATIO 6.5 (H) 01/13/2015       LIVER ENZYME RESULTS:  Lab Results   Component Value Date    AST 18 06/04/2017    ALT 31 06/04/2017       CBC RESULTS:  Lab Results   Component Value Date    WBC 8.7 06/04/2017    RBC 4.42 06/04/2017    HGB 12.5 (L) 06/04/2017    HCT 39.1 (L) 06/04/2017    MCV 89 06/04/2017    MCH 28.3 06/04/2017    MCHC 32.0 06/04/2017    RDW 12.9 06/04/2017     06/04/2017       BMP RESULTS:  Lab Results   Component Value Date     07/20/2017    POTASSIUM 4.2 07/20/2017    CHLORIDE 102 07/20/2017    CO2 24 07/20/2017    ANIONGAP 11 07/20/2017     (H) 07/20/2017    BUN 23 07/20/2017    CR 0.86 07/20/2017    GFRESTIMATED >90  Non  GFR Calc   07/20/2017    GFRESTBLACK >90   GFR Calc   07/20/2017    LÁZARO 8.5 07/20/2017         A1C RESULTS:  Lab Results   Component Value Date    A1C 6.0 07/20/2017       INR RESULTS:  No results found for: INR        CC  No referring provider defined for this encounter.

## 2017-08-10 ENCOUNTER — HOSPITAL ENCOUNTER (OUTPATIENT)
Dept: ULTRASOUND IMAGING | Facility: CLINIC | Age: 47
Discharge: HOME | End: 2017-08-10
Attending: PHYSICIAN ASSISTANT | Admitting: PHYSICIAN ASSISTANT
Payer: COMMERCIAL

## 2017-08-10 DIAGNOSIS — M79.662 BILATERAL CALF PAIN: ICD-10-CM

## 2017-08-10 DIAGNOSIS — M79.661 BILATERAL CALF PAIN: ICD-10-CM

## 2017-08-10 PROCEDURE — 93924 LWR XTR VASC STDY BILAT: CPT

## 2017-08-10 RX ORDER — GABAPENTIN 600 MG/1
TABLET ORAL
Qty: 240 TABLET | Refills: 1 | Status: SHIPPED | OUTPATIENT
Start: 2017-08-10 | End: 2017-08-22

## 2017-08-10 NOTE — PROGRESS NOTES
HISTORY OF PRESENT ILLNESS:  I got together with Cecil Vasquez today.  He is 46.  He has been chronically overweight and has had a strong family history of coronary artery disease with both mother and father who had CAD.  This also expressed himself in Cecil's life, having had coronary angiography in March of this year with a 99% stenosis of his LAD which was dilated and stented with a drug-eluting stent and noncritical coronary disease in the left circumflex and RCA.      His lipid profiles have been chronically bad.  He has had LDLs in the 180-207 range.  Triglycerides as high as 2000.  With his coronary disease we attacked his lipids.  He was rather aggressively put on Crestor 40 mg a day, which was reasonable but it led to severe bilateral calf tightness and pain.  Now 3 weeks off of Crestor his calves feel normal and they feel somewhat better.  He also was on Zetia with the Crestor.  This was stopped for a while but now it has been resumed at 10 mg a day.      He has had nothing to suggest angina, although he has had atypical focal stabbing grabbing fleeting shooting sharp chest discomforts, none of which sound cardiac or anginal in origin.  To support that clinical impression, he also did a stress nuclear study 04/12/2017 which showed an EF of 70% with no scars, no ischemia/infarcts.      CURRENT MEDICATIONS:   1.  Zetia 10 mg a day.   2.  Aspirin 81 mg a day.   3.  Neurontin 1800 mg 3 times a day.   4.  Ultram 50 mg a day.   5.  Norvasc 5 mg a day.   6.  Elavil 10 mg daily.   7.  Lisinopril 10 mg a day.   8.  Effient 10 mg daily.      PHYSICAL EXAMINATION:   GENERAL:  Well-developed, well-nourished, overweight male weighing 245 pounds, heart rate 60 beats per minute.   VITAL SIGNS:  Blood pressure 120/80.   HEAD:  Normal.   NECK:  Free of neck vein distention or bruit.   HEART:  Regular without gallop or murmur.   LUNGS:  Clear.   ABDOMEN:  Soft, obese, nontender without pain or mass.   EXTREMITIES:   Free of edema.  Pedal pulses +2.      I spent a lot of time discussing the needs for diet, weight loss, exercise and improved fitness in this otherwise well relatively young man.  His blood pressure is normal.  His lipid profile still needs some work.  I discussed abstinence of carbohydrates to get his triglycerides down and a regular pattern of walking and gentle exercise.  Should he continue to run high above 150-200, I would add fenofibrate 134-160 mg daily.      I started him on Crestor 5 mg at bedtime and will see if he can tolerate a much lower dose.  I would go to every other night or every third if I have to in hopes of keeping him on a statin.      He may need to be on PCSK9 injectable medications such as Repatha to lower his LDL, but hopefully we can avoid that.      IMPRESSION:   1.  Chronic mixed hyperlipidemia with elevated triglycerides and LDL.   2.  Possibly calf related pain and statin intolerance, but that was at 40 mg of rosuvastatin a day.  I started rosuvastatin 5 mg at bedtime.   3.  Chronically overweight.   4.  Post LAD stenting for a symptomatic pattern with angina.   5.  Atypical chest pains more recently.   6.  Normal stress nuclear study as noted above with an EF that is excellent and no scars or ischemia.        PLAN:  I asked to see him in 3 months.  I asked him to lose 20 pounds of weight.  I encouraged him to minimize his carbohydrates and we will see how he does.  Weight loss would also help with blood pressure management, etc.  We may need to adjust his medicines subsequently.      IMPRESSION:   1.  Hypertension, controlled.   2.  Mixed hyperlipidemia as above.   3.  Coronary artery disease, currently asymptomatic.   4.  Chronic obesity.   5.  Hypertension.      PLAN:  As above.      cc:   [place] [address]         DWIGHT NATH MD, Located within [facility]             D: 08/09/2017 16:15   T: 08/10/2017 05:47   MT: [email or web address]      Name:      CECILIO BLANCO   MRN:      [id number]        Account:      [id number]   :      1970           Service Date: 2017      Document: [id number]

## 2017-08-14 DIAGNOSIS — G62.9 NEUROPATHY: ICD-10-CM

## 2017-08-14 NOTE — TELEPHONE ENCOUNTER
tramadol  Last Written Prescription Date: 7/14/17  Last Fill Quantity: 180,  # refills: 0   Last Office Visit with G, P or [facility] prescribing provider: 8/9/17                                  Next 5 appointments (look out 90 days)     Aug 22, 2017  9:00 AM CDT   Office Visit with Gonzalo Cole MD   [facility] ([address] [id number] [phone]            Aug 31, 2017 10:40 AM CDT   Return Visit with Rose Durham PA-C   [facility] ([facility])    [address] [place] [id number] [phone]                  Jacqueline Cardona  Pharmacy Tech.  [facility]  [phone]

## 2017-08-15 DIAGNOSIS — G50.0 TRIGEMINAL NEURALGIA: ICD-10-CM

## 2017-08-15 NOTE — TELEPHONE ENCOUNTER
melatonin 3 MG tablet          Last Written Prescription Date:  6/16/17  Last Fill Quantity: 30,   # refills: 1  Last Office Visit with [facility], [facility] or [facility] prescribing provider: 8/1/17      Future Office visit:    Next 5 appointments (look out 90 days)     Aug 22, 2017  9:00 AM CDT   Office Visit with Gonzalo Cole MD   [facility] ([address] [address]   [phone]            Aug 31, 2017 10:40 AM CDT   Return Visit with Rose Durham PA-C   [facility] ([facility])    [address] [place] [id number]   [phone]                   Routing refill request to provider for review/approval because:  Drug not on the [facility], [facility] or [facility] refill protocol or controlled substance

## 2017-08-16 RX ORDER — LANOLIN ALCOHOL/MO/W.PET/CERES
CREAM (GRAM) TOPICAL
Qty: 30 TABLET | Refills: 1 | Status: ON HOLD | OUTPATIENT
Start: 2017-08-16 | End: 2019-10-16

## 2017-08-16 RX ORDER — TRAMADOL HYDROCHLORIDE 50 MG/1
50-100 TABLET ORAL 3 TIMES DAILY PRN
Qty: 180 TABLET | Refills: 0 | Status: SHIPPED | OUTPATIENT
Start: 2017-08-16 | End: 2017-09-10

## 2017-08-21 ENCOUNTER — TELEPHONE (OUTPATIENT)
Dept: CARDIOLOGY | Facility: CLINIC | Age: 47
End: 2017-08-21

## 2017-08-22 ENCOUNTER — OFFICE VISIT (OUTPATIENT)
Dept: FAMILY MEDICINE | Facility: CLINIC | Age: 47
End: 2017-08-22
Payer: COMMERCIAL

## 2017-08-22 VITALS
TEMPERATURE: 97.3 F | RESPIRATION RATE: 16 BRPM | HEIGHT: 69 IN | SYSTOLIC BLOOD PRESSURE: 132 MMHG | BODY MASS INDEX: 36.58 KG/M2 | DIASTOLIC BLOOD PRESSURE: 88 MMHG | HEART RATE: 84 BPM | WEIGHT: 247 LBS

## 2017-08-22 DIAGNOSIS — G62.9 NEUROPATHY: ICD-10-CM

## 2017-08-22 DIAGNOSIS — E78.2 MIXED HYPERLIPIDEMIA: ICD-10-CM

## 2017-08-22 DIAGNOSIS — I25.110 CORONARY ARTERY DISEASE INVOLVING NATIVE CORONARY ARTERY OF NATIVE HEART WITH UNSTABLE ANGINA PECTORIS (H): ICD-10-CM

## 2017-08-22 DIAGNOSIS — M79.10 MYALGIA: Primary | ICD-10-CM

## 2017-08-22 PROCEDURE — 99214 OFFICE O/P EST MOD 30 MIN: CPT | Performed by: FAMILY MEDICINE

## 2017-08-22 RX ORDER — GABAPENTIN 600 MG/1
TABLET ORAL
Qty: 270 TABLET | Refills: 3 | COMMUNITY
Start: 2017-08-22 | End: 2017-08-22

## 2017-08-22 RX ORDER — ROSUVASTATIN CALCIUM 5 MG/1
5 TABLET, COATED ORAL DAILY
Qty: 90 TABLET | Refills: 3 | Status: SHIPPED | OUTPATIENT
Start: 2017-08-22 | End: 2017-08-22

## 2017-08-22 RX ORDER — GABAPENTIN 600 MG/1
TABLET ORAL
Qty: 270 TABLET | Refills: 3 | Status: SHIPPED | OUTPATIENT
Start: 2017-08-22 | End: 2018-08-25

## 2017-08-22 ASSESSMENT — PAIN SCALES - GENERAL: PAINLEVEL: MILD PAIN (2)

## 2017-08-22 NOTE — MR AVS SNAPSHOT
After Visit Summary   8/22/2017    Cecil Vasquez    MRN: [id number]           Patient Information     Date Of Birth          1970        Visit Information        Provider Department      8/22/2017 9:00 AM Gonzalo Cole MD [facility]        Today's Diagnoses     Coronary artery disease involving native coronary artery of native heart with unstable angina pectoris (H)        Mixed hyperlipidemia           Follow-ups after your visit        Your next 10 appointments already scheduled     Aug 31, 2017 10:40 AM CDT   Return Visit with Rose Durham PA-C   [facility] ([facility])    [address] [address]   [phone]            Oct 18, 2017  1:00 PM CDT   New Visit with Andrea Starr LP   [facility] ([facility])    [address] [facility] [phone]            Oct 18, 2017  2:00 PM CDT   New Visit with CIRO Wilhelm CNP   [facility] ([facility])    [address] [facility] [phone]              Who to contact     If you have questions or need follow up information about today's clinic visit or your schedule please contact [facility] directly at [phone].  Normal or non-critical lab and imaging results will be communicated to you by MyChart, letter or phone within 4 business days after the clinic has received the results. If you do not hear from us within 7 days, please contact the clinic through MyChart or phone. If you have a critical or abnormal lab result, we will notify you by phone as soon as possible.  Submit refill requests through [facility] or call your pharmacy and they will forward the refill request to us. Please allow 3 business days for your refill to be completed.          Additional Information About Your Visit        [facility]  "Information     [facility] lets you send messages to your doctor, view your test results, renew your prescriptions, schedule appointments and more. To sign up, go to [email or web address] . Click on \"Log in\" on the left side of the screen, which will take you to the Welcome page. Then click on \"Sign up Now\" on the right side of the page.     You will be asked to enter the access code listed below, as well as some personal information. Please follow the directions to create your username and password.     Your access code is: [email or web address]  Expires: 10/18/2017 11:03 AM     Your access code will  in 90 days. If you need help or a new code, please call your [facility] clinic or [phone].        Care EveryWhere ID     This is your [facility] EveryWhere ID. This could be used by other organizations to access your [facility] medical records  [id number]        Your Vitals Were     Pulse Temperature Respirations Height BMI (Body Mass Index)       84 97.3  F (36.3  C) (Temporal) 16 5' 9\" (1.753 m) 36.48 kg/m2        Blood Pressure from Last 3 Encounters:   17 132/88   17 122/80   17 128/86    Weight from Last 3 Encounters:   17 247 lb (112 kg)   17 245 lb 1.6 oz (111.2 kg)   17 247 lb 12.8 oz (112.4 kg)              Today, you had the following     No orders found for display         Today's Medication Changes          These changes are accurate as of: 17  9:20 AM.  If you have any questions, ask your nurse or doctor.               Stop taking these medicines if you haven't already. Please contact your care team if you have questions.     rosuvastatin 5 MG tablet   Commonly known as:  CRESTOR   Stopped by:  Gonzalo Cole MD                    Primary Care Provider Office Phone # Fax #    Gonzalo Cole -[phone]       [address] [place] [address]        Equal Access to Services     JD PRINCE AH: ramo Henao " deangelo suryleodan guerrerodavid person. So [facility] [phone].    ATENCIÓN: Si ady tena, tiene a maynard disposición servicios gratuitos de asistencia lingüística. Tatum al [phone].    We comply with applicable federal civil rights laws and [place] laws. We do not discriminate on the basis of race, color, national origin, age, disability sex, sexual orientation or gender identity.            Thank you!     Thank you for choosing [facility]  for your care. Our goal is always to provide you with excellent care. Hearing back from our patients is one way we can continue to improve our services. Please take a few minutes to complete the written survey that you may receive in the mail after your visit with us. Thank you!             Your Updated Medication List - Protect others around you: Learn how to safely use, store and throw away your medicines at www.disposemymeds.org.          This list is accurate as of: 8/22/17  9:20 AM.  Always use your most recent med list.                   Brand Name Dispense Instructions for use Diagnosis    amitriptyline 10 MG tablet    ELAVIL    90 tablet    Start at 1 tab at bedtime for 3 days, then 2 tabs at bedtime for 3 days, then 3 tabs at bedtime.  Plan to increase dose to 50-75 mg at bedtime after 1 month    Chronic pain syndrome       amLODIPine 5 MG tablet    NORVASC    30 tablet    Take 1 tablet (5 mg) by mouth daily    Benign essential HTN       aspirin 81 MG EC tablet     90 tablet    Take 1 tablet (81 mg) by mouth daily    Coronary artery disease involving native coronary artery of native heart with unstable angina pectoris (H)       ezetimibe 10 MG tablet    ZETIA    90 tablet    Take 1 tablet (10 mg) by mouth daily    Hyperlipidemia LDL goal <70       gabapentin 600 MG tablet    NEURONTIN    240 tablet    TAKE THREE TABLETS BY MOUTH THREE TIMES A DAY    Neuropathy (H)       ipratropium - albuterol 0.5 mg/2.5 mg/3  mL 0.5-2.5 (3) MG/3ML neb solution    DUONEB    30 vial    Take 1 vial (3 mLs) by nebulization every 4 hours as needed for shortness of breath / dyspnea    Asthma with acute exacerbation, unspecified asthma severity       lisinopril 10 MG tablet    PRINIVIL/ZESTRIL    90 tablet    Take 1 tablet (10 mg) by mouth daily    Essential hypertension with goal blood pressure less than 140/90, ACS (acute coronary syndrome) (H)       MAPAP 325 MG tablet   Generic drug:  acetaminophen     100 tablet    TAKE TWO TABLETS BY MOUTH EVERY 4 HOURS AS NEEDED FOR MILD PAIN    Trigeminal neuralgia       melatonin 3 MG tablet     30 tablet    TAKE ONE TABLET BY MOUTH NIGHTLY AS NEEDED FOR SLEEP    Trigeminal neuralgia       nitroGLYcerin 0.4 MG sublingual tablet    NITROSTAT    25 tablet    For chest pain place 1 tablet under the tongue every 5 minutes for 3 doses. If symptoms persist 5 minutes after 1st dose call 911.    Coronary artery disease involving native coronary artery of native heart with unstable angina pectoris (H)       oxyCODONE 5 MG IR tablet    ROXICODONE    70 tablet    Take 1-2 tablets (5-10 mg) by mouth every 6 hours as needed for pain Max 3 per day    Neuropathy (H)       prasugrel 10 MG Tabs tablet    EFFIENT    90 tablet    Take 1 tablet (10 mg) by mouth daily    ACS (acute coronary syndrome) (H), Coronary artery disease involving native coronary artery of native heart with unstable angina pectoris (H)       traMADol 50 MG tablet    ULTRAM    180 tablet    Take 1-2 tablets ( mg) by mouth 3 times daily as needed for moderate pain    Neuropathy (H)       VENTOLIN  (90 BASE) MCG/ACT Inhaler   Generic drug:  albuterol     18 g    INHALE 2 PUFFS INTO THE LUNGS EVERY 4 HOURS AS NEEDED FOR SHORTNESS OF BREATH OR DIFFICULTY BREATHING    Asthma with acute exacerbation, unspecified asthma severity

## 2017-08-22 NOTE — PROGRESS NOTES
"  SUBJECTIVE:   Cecil Vasquez is a 46 year old male who presents to clinic today for the following health issues:      Chief Complaint   Patient presents with     Results     Discuss US, and Cardiology consult     Elbow Pain     Fluid on Right elbow x 3 weeks       Patient returns to clinic to discuss recent testing. He underwent exercise KARMA. These were normal. He has been struggling with bilateral calf pain. This is 90% improved since stopping his Crestor. He also saw cardiology in the interim. He was strongly encouraged to lose weight and go back on his Crestor no matter symptoms. He is young and has significant coronary artery disease. I also reassured him in terms of his ongoing sharp chest pains. He felt these were noncardiac in etiology. He also has a fluid buildup on his right elbow today. A look at. No injury. No pain. He is due for some medication refills. 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 This is been better on amitriptyline. He is tolerating medication well at 20 mg nightly. He also struggles with anxiety, this is been improved since opening up and his support groups and becoming more accountable.         ROS:  Constitutional, HEENT, cardiovascular, pulmonary, gi and gu systems are negative, except as otherwise noted.      OBJECTIVE:   /88  Pulse 84  Temp 97.3  F (36.3  C) (Temporal)  Resp 16  Ht 5' 9\" (1.753 m)  Wt 247 lb (112 kg)  BMI 36.48 kg/m2  Body mass index is 36.48 kg/(m^2).  GENERAL: healthy, alert and no distress  NECK: no adenopathy, no asymmetry, masses, or scars and thyroid normal to palpation  RESP: lungs clear to auscultation - no rales, rhonchi or wheezes  CV: regular rate and rhythm, normal S1 S2, no S3 or S4, no murmur, click or rub, no peripheral edema and peripheral pulses strong  ABDOMEN: soft, nontender, no hepatosplenomegaly, no masses and bowel sounds normal  MS: no gross musculoskeletal defects noted, no edema    Diagnostic Test Results:  none "     ASSESSMENT/PLAN:               ICD-10-CM    1. Myalgia M79.1    2. Coronary artery disease involving native coronary artery of native heart with unstable angina pectoris (H) I25.110 DISCONTINUED: rosuvastatin (CRESTOR) 5 MG tablet    s/p PHYLLIS to LAD 2/21/2017   3. Mixed hyperlipidemia E78.2 DISCONTINUED: rosuvastatin (CRESTOR) 5 MG tablet   4. Neuropathy (H) G62.9 gabapentin (NEURONTIN) 600 MG tablet     DISCONTINUED: gabapentin (NEURONTIN) 600 MG tablet     Sounds like he had statin-induced myalgias. We will restart him on a much lower dose. Upward titrating gradually over the next month or 2 until we get to a high intensity level. This was sent but requires prior authorization apparently. Refill for his gabapentin. Stable, if not improving chronic pain from his cephalic neuralgia. Continue the amitriptyline, gabapentin, oxycodone, tramadol. Stable coronary artery disease. Feeling much better after reassurance given by cardiology. See him back in a month      Gonzalo Cole MD  [facility]

## 2017-08-22 NOTE — TELEPHONE ENCOUNTER
[facility] Prior Authorization Team   Phone: [phone]  Fax: [phone]      PA Initiation    Medication: rosuvastatin (CRESTOR) 5 MG tablet  Insurance Company: [place] Medicaid ([facility]) - Phone [phone] Fax [phone]  Pharmacy Filling the Rx: [address]   Filling Pharmacy Phone: [phone]  Filling Pharmacy Fax:    Start Date: 8/22/2017

## 2017-08-22 NOTE — NURSING NOTE
"Chief Complaint   Patient presents with     Results     Discuss US, and Cardiology consult     Elbow Pain     Fluid on Right elbow x 3 weeks       Initial /88  Pulse 84  Temp 97.3  F (36.3  C) (Temporal)  Resp 16  Ht 5' 9\" (1.753 m)  Wt 247 lb (112 kg)  BMI 36.48 kg/m2 Estimated body mass index is 36.48 kg/(m^2) as calculated from the following:    Height as of this encounter: 5' 9\" (1.753 m).    Weight as of this encounter: 247 lb (112 kg).  Medication Reconciliation: complete   Rachael Hubbard CMA (AAMA)   "

## 2017-08-25 DIAGNOSIS — G62.9 NEUROPATHY: ICD-10-CM

## 2017-08-25 NOTE — TELEPHONE ENCOUNTER
Oxycodone 5mg      Last Written Prescription Date: 08/01/2017  Last Fill Quantity: 70,  # refills: 0   Last Office Visit with [facility], [facility] or [facility] prescribing provider: 08/22/2017                                         Next 5 appointments (look out 90 days)     Aug 31, 2017 10:40 AM CDT   [facility]   [facility]    [address] [place] [id number] [phone]                  Violeta Allen CPhT  [facility] [place]  [phone]

## 2017-08-28 RX ORDER — OXYCODONE HYDROCHLORIDE 5 MG/1
5-10 TABLET ORAL EVERY 6 HOURS PRN
Qty: 70 TABLET | Refills: 0 | Status: SHIPPED | OUTPATIENT
Start: 2017-08-28 | End: 2017-09-24

## 2017-08-31 DIAGNOSIS — J45.901 ASTHMA WITH ACUTE EXACERBATION, UNSPECIFIED ASTHMA SEVERITY: ICD-10-CM

## 2017-08-31 NOTE — TELEPHONE ENCOUNTER
Ventolin       Last Written Prescription Date: 2/22/17  Last Fill Quantity: 18g, # refills: 0    Last Office Visit with G, P or  Health prescribing provider:  8/22/17   Future Office Visit:    Next 5 appointments (look out 90 days)     Sep 07, 2017  8:50 AM CDT   Office Visit with Alvarez Horne MD   [facility]    [address] [place] [id number] [phone]                   Date of Last Asthma Action Plan Letter:   Asthma Action Plan Q1 Year    Topic Date Due     Asthma Action Plan - yearly  05/18/2018      Asthma Control Test:   ACT Total Scores 7/14/2017   ACT TOTAL SCORE -   ASTHMA ER VISITS -   ASTHMA HOSPITALIZATIONS -   ACT TOTAL SCORE (Goal Greater than or Equal to 20) 23   In the past 12 months, how many times did you visit the emergency room for your asthma without being admitted to the hospital? 0   In the past 12 months, how many times were you hospitalized overnight because of your asthma? 0       Date of Last Spirometry Test:   No results found for this or any previous visit.          Maria De Jesus Garcia MA 8/31/2017

## 2017-09-06 RX ORDER — ALBUTEROL SULFATE 90 UG/1
AEROSOL, METERED RESPIRATORY (INHALATION)
Qty: 18 G | Refills: 5 | Status: ON HOLD | OUTPATIENT
Start: 2017-09-06 | End: 2019-10-16

## 2017-09-06 NOTE — TELEPHONE ENCOUNTER
Prescription approved per [facility] Refill Protocol............RUSS Li

## 2017-09-10 DIAGNOSIS — G62.9 NEUROPATHY: ICD-10-CM

## 2017-09-10 NOTE — TELEPHONE ENCOUNTER
Tramadol 50mg      Last Written Prescription Date: 08/16/2017  Last Fill Quantity: 180,  # refills: 0   Last Office Visit with [facility], [facility] or [facility] prescribing provider: 08/22/2017    Violeta Allen CPhT  [facility] [place]  [phone]

## 2017-09-11 NOTE — TELEPHONE ENCOUNTER
PRIOR AUTHORIZATION DENIED    Medication: rosuvastatin (CRESTOR) 5 MG tablet - DENIED    Denial Date: 8/31/2017    Denial Rational: Patient must have a history of trial & failure of 3 of the formulary alternative(s), or a contraindication or intolerance to the formulary alternative(s): atorvastatin tablets, lovastatin tablets, pravastatin tablets, simvastatin tablets.  If you feel there is additional information related to this case that might affect this decision and an appeal is desired, please submit a written letter of medical necessity to our PA Team.        Appeal Information:

## 2017-09-12 ENCOUNTER — TELEPHONE (OUTPATIENT)
Dept: CARDIOLOGY | Facility: CLINIC | Age: 47
End: 2017-09-12

## 2017-09-12 DIAGNOSIS — E78.5 HYPERLIPIDEMIA: Primary | ICD-10-CM

## 2017-09-12 RX ORDER — TRAMADOL HYDROCHLORIDE 50 MG/1
50-100 TABLET ORAL 3 TIMES DAILY PRN
Qty: 180 TABLET | Refills: 0 | Status: SHIPPED | OUTPATIENT
Start: 2017-09-12 | End: 2017-10-10

## 2017-09-12 NOTE — TELEPHONE ENCOUNTER
Prior authorization for Crestor 5 mg daily was denied by patient's insurance company. Reviewed this with Dr. Wallace who recommended patient try Atorvastatin 10 mg daily if the Crestor is prohibitively expensive for him. Call made to patient to review and determine the cost of Crestor for him. Unable to reach patient, but left message requesting call back. CORRIE Preciado RN

## 2017-09-13 NOTE — TELEPHONE ENCOUNTER
Left another voice message requesting patient return call to discuss statin options per Dr. Wlalace. Did check with [facility] showing cost of generic Crestor 5 or 10 mg tablets from $20.00 at [facility] to $89.00 at [facility].  Await his call back.

## 2017-09-24 DIAGNOSIS — G62.9 NEUROPATHY: ICD-10-CM

## 2017-09-24 NOTE — TELEPHONE ENCOUNTER
Oxycodone 5mg      Last Written Prescription Date: 08/29/2017  Last Fill Quantity: 70,  # refills: 0   Last Office Visit with G, P or [facility] prescribing provider: 08/22/2017    Angelica Dorsey, Pharmacy Technician  [facility]  [phone]]

## 2017-09-27 ENCOUNTER — TELEPHONE (OUTPATIENT)
Dept: PALLIATIVE MEDICINE | Facility: CLINIC | Age: 47
End: 2017-09-27

## 2017-09-27 ENCOUNTER — OFFICE VISIT (OUTPATIENT)
Dept: PALLIATIVE MEDICINE | Facility: CLINIC | Age: 47
End: 2017-09-27
Payer: COMMERCIAL

## 2017-09-27 VITALS
BODY MASS INDEX: 36.18 KG/M2 | WEIGHT: 245 LBS | SYSTOLIC BLOOD PRESSURE: 173 MMHG | DIASTOLIC BLOOD PRESSURE: 115 MMHG | HEART RATE: 90 BPM

## 2017-09-27 DIAGNOSIS — G50.1 ATYPICAL FACIAL PAIN: Primary | ICD-10-CM

## 2017-09-27 PROCEDURE — 99205 OFFICE O/P NEW HI 60 MIN: CPT | Performed by: PAIN MEDICINE

## 2017-09-27 RX ORDER — OXYCODONE HYDROCHLORIDE 5 MG/1
5-10 TABLET ORAL EVERY 6 HOURS PRN
Qty: 70 TABLET | Refills: 0 | Status: SHIPPED | OUTPATIENT
Start: 2017-09-27 | End: 2017-10-25

## 2017-09-27 ASSESSMENT — PAIN SCALES - GENERAL: PAINLEVEL: SEVERE PAIN (6)

## 2017-09-27 NOTE — PROGRESS NOTES
[facility] Consultation    Date of visit: 9/27/2017    Reason for consultation:    Primary Care Provider is Gonzalo Cole.  Pain medications are being prescribed by PCP    Please see the [facility] health questionnaire which the patient completed and reviewed with me in detail.    Chief Complaint:    Chief Complaint   Patient presents with     Pain     Head and face pain        Pain history:  Cecil Vasquez is a 46 year old male who first started having R facial pain about 3-5 years ago. He denies any inciting event. The pain is located over the R side of his face and radiates to the temporal area of the scalp. The pain is mainly in the V2 distribution, but components of v1/v3 are present. The pain was initially an intermittent sharp shooting pain, but over time and with initiation of a medical regimen it's more of a dull aching/burning pain. The pain is constant, but is exacerbated by touch and water when showering. He denies any association w/ eating. He reports that the pain has gotten worse of the last couple of months, and does not fell his regimen of anna/elavil/oxy/tramdol is not helping as much. He denies any symptoms on the L side. He denies any fascial weakness. MRI neg for vascular compression of trigeminal of nerve. Of note he feels the pain is greatly limiting his ability to work.    Pain rating: intensity ranges from Averages 9/10 on a 0-10 scale.  Aggravating factors include: touch/water  Relieving factors include: medical regimen        Current treatments include:   anna/elavil/oxy/tramdol    Previous medication treatments included:  Tegretol     Other treatments have included:    Injections: none    Past Medical History:  Past Medical History:   Diagnosis Date     ADHD (attention deficit hyperactivity disorder)      Bipolar 1 disorder (H)      Depression      Gastro-oesophageal reflux disease      Hyperlipidaemia      Hypertension      Impaired  fasting glucose      Nephrolithiasis      Obesity      Reactive airway disease     uses albuterol inhaler when has cold     Renal calcification      Rosacea      Tobacco abuse      Trigeminal neuralgia      Uncomplicated asthma      Past Surgical History:  Past Surgical History:   Procedure Laterality Date     COMBINED CYSTOSCOPY, INSERT CATHETER URETER Left 3/25/2016    Procedure: COMBINED CYSTOSCOPY, INSERT CATHETER URETER;  Surgeon: Jorden Villafana MD;  Location:  OR     COMBINED CYSTOSCOPY, RETROGRADES, URETEROSCOPY, LASER HOLMIUM LITHOTRIPSY URETER(S), INSERT STENT Left 4/13/2016    Procedure: COMBINED CYSTOSCOPY, RETROGRADES, URETEROSCOPY, LASER HOLMIUM LITHOTRIPSY URETER(S), INSERT STENT;  Surgeon: Jorden Villafana MD;  Location:  OR     EXTRACORPOREAL SHOCK WAVE LITHOTRIPSY (ESWL) Left 3/25/2016    Procedure: EXTRACORPOREAL SHOCK WAVE LITHOTRIPSY (ESWL);  Surgeon: Jorden Villafana MD;  Location:  OR     EXTRACORPOREAL SHOCK WAVE LITHOTRIPSY, CYSTOSCOPY, INSERT STENT URETER(S), COMBINED Left 3/25/2016    Procedure: COMBINED EXTRACORPOREAL SHOCK WAVE LITHOTRIPSY, CYSTOSCOPY, INSERT STENT URETER(S);  Surgeon: Jorden Villafana MD;  Location:  OR     GENITOURINARY SURGERY  3 25 2016     Lithotripsy and stent placement     TONSILLECTOMY       Medications:  Current Outpatient Prescriptions   Medication Sig Dispense Refill     traMADol (ULTRAM) 50 MG tablet Take 1-2 tablets ( mg) by mouth 3 times daily as needed for moderate pain 180 tablet 0     VENTOLIN  (90 BASE) MCG/ACT Inhaler INHALE 2 PUFFS INTO THE LUNGS EVERY 4 HOURS AS NEEDED FOR SHORTNESS OF BREATH OR DIFFICULTY BREATHING 18 g 5     oxyCODONE (ROXICODONE) 5 MG IR tablet Take 1-2 tablets (5-10 mg) by mouth every 6 hours as needed for pain Max 3 per day 70 tablet 0     gabapentin (NEURONTIN) 600 MG tablet TAKE THREE TABLETS BY MOUTH THREE TIMES A  tablet 3     melatonin 3 MG tablet TAKE ONE TABLET BY MOUTH  NIGHTLY AS NEEDED FOR SLEEP 30 tablet 1     nitroGLYcerin (NITROSTAT) 0.4 MG sublingual tablet For chest pain place 1 tablet under the tongue every 5 minutes for 3 doses. If symptoms persist 5 minutes after 1st dose call 911. 25 tablet 3     amitriptyline (ELAVIL) 10 MG tablet Start at 1 tab at bedtime for 3 days, then 2 tabs at bedtime for 3 days, then 3 tabs at bedtime.  Plan to increase dose to 50-75 mg at bedtime after 1 month 90 tablet 0     amLODIPine (NORVASC) 5 MG tablet Take 1 tablet (5 mg) by mouth daily 30 tablet 11     aspirin 81 MG EC tablet Take 1 tablet (81 mg) by mouth daily 90 tablet 0     MAPAP 325 MG tablet TAKE TWO TABLETS BY MOUTH EVERY 4 HOURS AS NEEDED FOR MILD PAIN 100 tablet 11     lisinopril (PRINIVIL/ZESTRIL) 10 MG tablet Take 1 tablet (10 mg) by mouth daily 90 tablet 3     prasugrel (EFFIENT) 10 MG TABS tablet Take 1 tablet (10 mg) by mouth daily 90 tablet 3     ipratropium - albuterol 0.5 mg/2.5 mg/3 mL (DUONEB) 0.5-2.5 (3) MG/3ML neb solution Take 1 vial (3 mLs) by nebulization every 4 hours as needed for shortness of breath / dyspnea 30 vial 0     ezetimibe (ZETIA) 10 MG tablet Take 1 tablet (10 mg) by mouth daily (Patient not taking: Reported on 7/20/2017) 90 tablet 3     Allergies:     Allergies   Allergen Reactions     No Known Drug Allergies      Social History:  Occupation/Schooling: working but part time right now  Tobacco use: former  Alcohol use: wnl  Drug use: none  History of chemical dependency treatment: no    Family history:  Family History   Problem Relation Age of Onset     Psychotic Disorder Mother      DIABETES Sister      Psychotic Disorder Sister      Family history of headaches: no    Review of Systems:  Skin: negative  Eyes: negative  Ears/Nose/Throat: negative  Respiratory: No shortness of breath, dyspnea on exertion, cough, or hemoptysis  Cardiovascular: negative  Gastrointestinal: negative  Genitourinary: negative  Musculoskeletal: negative  Neurologic:  negative  Psychiatric: negative  Hematologic/Lymphatic/Immunologic: negative  Endocrine: negative    Physical Exam:  Vitals:    09/27/17 0912   BP: (!) 173/115   Pulse: 90   Weight: 111.1 kg (245 lb)     Exam:  Constitutional: healthy, alert and no distress  Head: normocephalic + TTP over R side of face  Eyes: no redness or jaundice noted   ENT: oropharnx normal.  MMM.  Neck supple.    Cardiovascular: RRR no m/g/r   Respiratory: clear   Gastrointestinal: soft, non-tender, normoactive bowel sounds     Skin: no suspicious lesions or rashes  Psychiatric: mentation appears normal and affect normal/bright    Musculoskeletal exam:  Gait/Station/Posture:   Cervical spine: ROM     Neurologic exam:  CN:  Cranial nerves 2-12 are normal      Diagnostic tests:  MRI of 6/16   IMPRESSION:   1. The brain parenchyma appears normal. No brainstem lesions are seen.  2. The trigeminal nerves appear normal. No aberrant blood vessels are  identified. No trigeminal nerve displacement or compression is  identified.     Assessment/Plan:  Cecil Vasquez is a 46 year old male who presents with the complaints of  R facial pain.   Cecil was seen today for pain.    Diagnoses and all orders for this visit:    Atypical facial pain  -     PAIN INJECTION EVAL/TREAT/FOLLOW UP      - Schedule for R Trigeminal nerve block   - Consider switching to lyrica from Gabapentin (PCP)  - Consider changing opioid regimen if no relief with Procedure + switch to lyrica   - f/u w/ PCP    - consider ref to Clinical Health Psychologist to address issues of relaxation, behavioral change, coping style, and other factors important to improvement:     Total time spent was 60 minutes, and more than 50% of face to face time was spent i counseling and/or coordination of care regarding principles of multidisciplinary care and management of the pt R atypical fascial pain.     Sunil Cowart MD  [facility]

## 2017-09-27 NOTE — NURSING NOTE
"Chief Complaint   Patient presents with     Pain     Head and face pain        Initial There were no vitals taken for this visit. Estimated body mass index is 36.48 kg/(m^2) as calculated from the following:    Height as of 8/22/17: 1.753 m (5' 9\").    Weight as of 8/22/17: 112 kg (247 lb).  Medication Reconciliation: complete     Rui Ross MA      "

## 2017-09-27 NOTE — PROGRESS NOTES
Thanks for seeing him. I am glad you noted his coping abilities which I think are minimal, and complicated by his anxiety, which he is fully aware of. Please let me know if you think the opioids are a good long term plan, or not. Look forward to seeing if the injections work. I will look for your note. Thanks. rh

## 2017-09-27 NOTE — PATIENT INSTRUCTIONS
Plan:  - Schedule for Trigeminal nerve block   - Consider switching to lyrica from Gabapentin (PCP)  - Consider changing opioid regimen if no relief with Procedure + switch to lyrica   - f/u w/ PCP   -return to clinic about 2 weeks after the trigeminal nerve block  ----------------------------------------------------------------  Nurse Triage line:  [phone]   Call this number with any questions or concerns. You may leave a detailed message anytime. Calls are typically returned Monday through Friday between 8 AM and 4:30 PM. We usually get back to you within 2 business days depending on the issue/request.      Scheduling number: [phone].  Call this number to schedule or change appointments.    We believe regular attendance is key to your success in our program.    Any time you are unable to keep your appointment we ask that you call us at least 24 hours in advance to let us know. This will allow us to offer the appointment time to another patient.

## 2017-09-27 NOTE — TELEPHONE ENCOUNTER
Patient will call back after he discusses this procedure with family.     Nel LI    [facility]

## 2017-09-27 NOTE — MR AVS SNAPSHOT
After Visit Summary   9/27/2017    Cecil Vasquez    MRN: [id number]           Patient Information     Date Of Birth          1970        Visit Information        Provider Department      9/27/2017 9:00 AM Sunil Cowart MD [place] [facility]        Today's Diagnoses     Atypical facial pain    -  1      Care Instructions    Plan:  - Schedule for Trigeminal nerve block   - Consider switching to lyrica from Gabapentin (PCP)  - Consider changing opioid regimen if no relief with Procedure + switch to lyrica   - f/u w/ PCP   -return to clinic about 2 weeks after the trigeminal nerve block  ----------------------------------------------------------------  Nurse Triage line:  [phone]   Call this number with any questions or concerns. You may leave a detailed message anytime. Calls are typically returned Monday through Friday between 8 AM and 4:30 PM. We usually get back to you within 2 business days depending on the issue/request.      Scheduling number: [phone].  Call this number to schedule or change appointments.    We believe regular attendance is key to your success in our program.    Any time you are unable to keep your appointment we ask that you call us at least 24 hours in advance to let us know. This will allow us to offer the appointment time to another patient.               Follow-ups after your visit        Additional Services     PAIN INJECTION EVAL/TREAT/FOLLOW UP                 Who to contact     If you have questions or need follow up information about today's clinic visit or your schedule please contact [facility] directly at [phone].  Normal or non-critical lab and imaging results will be communicated to you by MyChart, letter or phone within 4 business days after the clinic has received the results. If you do not hear from us within 7 days, please contact the clinic through MyChart or phone. If you have a critical or abnormal lab  "result, we will notify you by phone as soon as possible.  Submit refill requests through [employer] or call your pharmacy and they will forward the refill request to us. Please allow 3 business days for your refill to be completed.          Additional Information About Your Visit        [employer] Information     [employer] lets you send messages to your doctor, view your test results, renew your prescriptions, schedule appointments and more. To sign up, go to [email or web address] . Click on \"Log in\" on the left side of the screen, which will take you to the Welcome page. Then click on \"Sign up Now\" on the right side of the page.     You will be asked to enter the access code listed below, as well as some personal information. Please follow the directions to create your username and password.     Your access code is: [email or web address]  Expires: 10/18/2017 11:03 AM     Your access code will  in 90 days. If you need help or a new code, please call your [facility] clinic or [phone].        Care EveryWhere ID     This is your Care EveryWhere ID. This could be used by other organizations to access your [facility] medical records  [id number]        Your Vitals Were     Pulse BMI (Body Mass Index)                90 36.18 kg/m2           Blood Pressure from Last 3 Encounters:   17 (!) 173/115   17 132/88   17 122/80    Weight from Last 3 Encounters:   17 111.1 kg (245 lb)   17 112 kg (247 lb)   17 111.2 kg (245 lb 1.6 oz)              We Performed the Following     PAIN INJECTION EVAL/TREAT/FOLLOW UP        Primary Care Provider Office Phone # Fax #    Gonzalo Cole [phone] [address] [place] [address]        Equal Access to Services     [facility] : Candelaria Ponce, wakikoda luqadaha, qaybta kaalmada gino, david reynoso. [facility] [phone].    ATENCIÓN: Si sterling holliday a maynard disposición " servicios gratuitos de asistencia lingüística. Tatum olivier [phone].    We comply with applicable federal civil rights laws and [place] laws. We do not discriminate on the basis of race, color, national origin, age, disability sex, sexual orientation or gender identity.            Thank you!     Thank you for choosing [facility]  for your care. Our goal is always to provide you with excellent care. Hearing back from our patients is one way we can continue to improve our services. Please take a few minutes to complete the written survey that you may receive in the mail after your visit with us. Thank you!             Your Updated Medication List - Protect others around you: Learn how to safely use, store and throw away your medicines at www.disposemymeds.org.          This list is accurate as of: 9/27/17  9:59 AM.  Always use your most recent med list.                   Brand Name Dispense Instructions for use Diagnosis    amitriptyline 10 MG tablet    ELAVIL    90 tablet    Start at 1 tab at bedtime for 3 days, then 2 tabs at bedtime for 3 days, then 3 tabs at bedtime.  Plan to increase dose to 50-75 mg at bedtime after 1 month    Chronic pain syndrome       amLODIPine 5 MG tablet    NORVASC    30 tablet    Take 1 tablet (5 mg) by mouth daily    Benign essential HTN       aspirin 81 MG EC tablet     90 tablet    Take 1 tablet (81 mg) by mouth daily    Coronary artery disease involving native coronary artery of native heart with unstable angina pectoris (H)       ezetimibe 10 MG tablet    ZETIA    90 tablet    Take 1 tablet (10 mg) by mouth daily    Hyperlipidemia LDL goal <70       gabapentin 600 MG tablet    NEURONTIN    270 tablet    TAKE THREE TABLETS BY MOUTH THREE TIMES A DAY    Neuropathy (H)       ipratropium - albuterol 0.5 mg/2.5 mg/3 mL 0.5-2.5 (3) MG/3ML neb solution    DUONEB    30 vial    Take 1 vial (3 mLs) by nebulization every 4 hours as needed for shortness of breath / dyspnea     Asthma with acute exacerbation, unspecified asthma severity       lisinopril 10 MG tablet    PRINIVIL/ZESTRIL    90 tablet    Take 1 tablet (10 mg) by mouth daily    Essential hypertension with goal blood pressure less than 140/90, ACS (acute coronary syndrome) (H)       MAPAP 325 MG tablet   Generic drug:  acetaminophen     100 tablet    TAKE TWO TABLETS BY MOUTH EVERY 4 HOURS AS NEEDED FOR MILD PAIN    Trigeminal neuralgia       melatonin 3 MG tablet     30 tablet    TAKE ONE TABLET BY MOUTH NIGHTLY AS NEEDED FOR SLEEP    Trigeminal neuralgia       nitroGLYcerin 0.4 MG sublingual tablet    NITROSTAT    25 tablet    For chest pain place 1 tablet under the tongue every 5 minutes for 3 doses. If symptoms persist 5 minutes after 1st dose call 911.    Coronary artery disease involving native coronary artery of native heart with unstable angina pectoris (H)       oxyCODONE 5 MG IR tablet    ROXICODONE    70 tablet    Take 1-2 tablets (5-10 mg) by mouth every 6 hours as needed for pain Max 3 per day    Neuropathy (H)       prasugrel 10 MG Tabs tablet    EFFIENT    90 tablet    Take 1 tablet (10 mg) by mouth daily    ACS (acute coronary syndrome) (H), Coronary artery disease involving native coronary artery of native heart with unstable angina pectoris (H)       traMADol 50 MG tablet    ULTRAM    180 tablet    Take 1-2 tablets ( mg) by mouth 3 times daily as needed for moderate pain    Neuropathy (H)       VENTOLIN  (90 BASE) MCG/ACT Inhaler   Generic drug:  albuterol     18 g    INHALE 2 PUFFS INTO THE LUNGS EVERY 4 HOURS AS NEEDED FOR SHORTNESS OF BREATH OR DIFFICULTY BREATHING    Asthma with acute exacerbation, unspecified asthma severity

## 2017-09-29 NOTE — TELEPHONE ENCOUNTER
Again reached out to patient to follow up regarding his Crestor 5 mg daily to determine if this is affordable for him or if switching to Atorvastatin 10 mg daily would be a better option for him. Unable to reach him, but left message requesting call back to review. CORRIE Preciado RN

## 2017-10-06 ENCOUNTER — OFFICE VISIT (OUTPATIENT)
Dept: FAMILY MEDICINE | Facility: CLINIC | Age: 47
End: 2017-10-06
Payer: COMMERCIAL

## 2017-10-06 ENCOUNTER — TELEPHONE (OUTPATIENT)
Dept: FAMILY MEDICINE | Facility: CLINIC | Age: 47
End: 2017-10-06

## 2017-10-06 VITALS
HEART RATE: 121 BPM | SYSTOLIC BLOOD PRESSURE: 154 MMHG | OXYGEN SATURATION: 95 % | BODY MASS INDEX: 37.88 KG/M2 | TEMPERATURE: 97.9 F | WEIGHT: 256.5 LBS | DIASTOLIC BLOOD PRESSURE: 92 MMHG

## 2017-10-06 DIAGNOSIS — Z23 NEED FOR VACCINATION: ICD-10-CM

## 2017-10-06 DIAGNOSIS — G62.9 NEUROPATHY: ICD-10-CM

## 2017-10-06 DIAGNOSIS — Z23 NEED FOR PROPHYLACTIC VACCINATION AND INOCULATION AGAINST INFLUENZA: ICD-10-CM

## 2017-10-06 DIAGNOSIS — G89.4 CHRONIC PAIN SYNDROME: ICD-10-CM

## 2017-10-06 DIAGNOSIS — I24.9 ACS (ACUTE CORONARY SYNDROME) (H): ICD-10-CM

## 2017-10-06 DIAGNOSIS — F41.9 ANXIETY: Primary | ICD-10-CM

## 2017-10-06 DIAGNOSIS — I10 ESSENTIAL HYPERTENSION WITH GOAL BLOOD PRESSURE LESS THAN 140/90: ICD-10-CM

## 2017-10-06 PROCEDURE — 90472 IMMUNIZATION ADMIN EACH ADD: CPT | Performed by: FAMILY MEDICINE

## 2017-10-06 PROCEDURE — 99214 OFFICE O/P EST MOD 30 MIN: CPT | Mod: 25 | Performed by: FAMILY MEDICINE

## 2017-10-06 PROCEDURE — 80307 DRUG TEST PRSMV CHEM ANLYZR: CPT | Mod: 90 | Performed by: FAMILY MEDICINE

## 2017-10-06 PROCEDURE — 90670 PCV13 VACCINE IM: CPT | Performed by: FAMILY MEDICINE

## 2017-10-06 PROCEDURE — 90686 IIV4 VACC NO PRSV 0.5 ML IM: CPT | Performed by: FAMILY MEDICINE

## 2017-10-06 PROCEDURE — 90471 IMMUNIZATION ADMIN: CPT | Performed by: FAMILY MEDICINE

## 2017-10-06 PROCEDURE — 99000 SPECIMEN HANDLING OFFICE-LAB: CPT | Performed by: FAMILY MEDICINE

## 2017-10-06 RX ORDER — PREGABALIN 50 MG/1
50 CAPSULE ORAL 3 TIMES DAILY
Qty: 90 CAPSULE | Refills: 1 | Status: SHIPPED | OUTPATIENT
Start: 2017-10-06 | End: 2018-08-25

## 2017-10-06 RX ORDER — LISINOPRIL 10 MG/1
20 TABLET ORAL DAILY
Qty: 90 TABLET | Refills: 3 | Status: ON HOLD | COMMUNITY
Start: 2017-10-06 | End: 2019-10-16

## 2017-10-06 ASSESSMENT — PAIN SCALES - GENERAL: PAINLEVEL: MODERATE PAIN (4)

## 2017-10-06 NOTE — MR AVS SNAPSHOT
"              After Visit Summary   10/6/2017    Cecil Vasquez    MRN: [id number]           Patient Information     Date Of Birth          1970        Visit Information        Provider Department      10/6/2017 2:20 PM Gonzalo Cole MD [facility]        Today's Diagnoses     Anxiety    -  1    Chronic pain syndrome        Essential hypertension with goal blood pressure less than 140/90        Neuropathy        ACS (acute coronary syndrome) (H)           Follow-ups after your visit        Your next 10 appointments already scheduled     Nov 07, 2017  8:20 AM CST   SHORT with Gonzalo Cole MD   [facility]    [address] [place] [id number] [phone]              Who to contact     If you have questions or need follow up information about today's clinic visit or your schedule please contact [facility] directly at [phone].  Normal or non-critical lab and imaging results will be communicated to you by MyChart, letter or phone within 4 business days after the clinic has received the results. If you do not hear from us within 7 days, please contact the clinic through [employer] or phone. If you have a critical or abnormal lab result, we will notify you by phone as soon as possible.  Submit refill requests through [employer] or call your pharmacy and they will forward the refill request to us. Please allow 3 business days for your refill to be completed.          Additional Information About Your Visit        MyChart Information     [employer] lets you send messages to your doctor, view your test results, renew your prescriptions, schedule appointments and more. To sign up, go to [email or web address] . Click on \"Log in\" on the left side of the screen, which will take you to the Welcome page. Then click on \"Sign up Now\" on the right side of the page.     You will be asked to enter the access code listed " below, as well as some personal information. Please follow the directions to create your username and password.     Your access code is: [email or web address]  Expires: 10/18/2017 11:03 AM     Your access code will  in 90 days. If you need help or a new code, please call your [facility] clinic or [phone].        Care EveryWhere ID     This is your Care EveryWhere ID. This could be used by other organizations to access your [facility] medical records  [id number]        Your Vitals Were     Pulse Temperature Pulse Oximetry BMI (Body Mass Index)          121 97.9  F (36.6  C) (Temporal) 95% 37.88 kg/m2         Blood Pressure from Last 3 Encounters:   10/06/17 (!) 154/92   17 (!) 173/115   17 132/88    Weight from Last 3 Encounters:   10/06/17 256 lb 8 oz (116.3 kg)   17 245 lb (111.1 kg)   17 247 lb (112 kg)              We Performed the Following     Drug  Screen Comprehensive , Urine with Reported Meds (MedTox) (Pain Care Package)          Today's Medication Changes          These changes are accurate as of: 10/6/17  3:16 PM.  If you have any questions, ask your nurse or doctor.               Start taking these medicines.        Dose/Directions    pregabalin 50 MG capsule   Commonly known as:  LYRICA   Used for:  Chronic pain syndrome   Started by:  Gonzalo Cole MD        Dose:  50 mg   Take 1 capsule (50 mg) by mouth 3 times daily   Quantity:  90 capsule   Refills:  1         These medicines have changed or have updated prescriptions.        Dose/Directions    lisinopril 10 MG tablet   Commonly known as:  PRINIVIL/ZESTRIL   This may have changed:  how much to take   Used for:  Essential hypertension with goal blood pressure less than 140/90, ACS (acute coronary syndrome) (H)   Changed by:  Gonzalo Cole MD        Dose:  20 mg   Take 2 tablets (20 mg) by mouth daily   Quantity:  90 tablet   Refills:  3            Where to get your medicines      Some of these will need a  paper prescription and others can be bought over the counter.  Ask your nurse if you have questions.     Bring a paper prescription for each of these medications     pregabalin 50 MG capsule                Primary Care Provider Office Phone # Fax #    Gonzalo Cole [phone]       [address] [place] [address]        Equal Access to Services     [facility] : Hadii aad ku hadasho Soomaali, waaxda luqadaha, qaybta kaalmada adeegyada, waxay idiin hayaan adeeg kharash labriseidan . So [facility] [phone].    ATENCIÓN: Si habla español, tiene a maynard disposición servicios gratuitos de asistencia lingüística. Llame al [phone].    We comply with applicable federal civil rights laws and [place] laws. We do not discriminate on the basis of race, color, national origin, age, disability, sex, sexual orientation, or gender identity.            Thank you!     Thank you for choosing [facility]  for your care. Our goal is always to provide you with excellent care. Hearing back from our patients is one way we can continue to improve our services. Please take a few minutes to complete the written survey that you may receive in the mail after your visit with us. Thank you!             Your Updated Medication List - Protect others around you: Learn how to safely use, store and throw away your medicines at www.disposemymeds.org.          This list is accurate as of: 10/6/17  3:16 PM.  Always use your most recent med list.                   Brand Name Dispense Instructions for use Diagnosis    amitriptyline 10 MG tablet    ELAVIL    90 tablet    Start at 1 tab at bedtime for 3 days, then 2 tabs at bedtime for 3 days, then 3 tabs at bedtime.  Plan to increase dose to 50-75 mg at bedtime after 1 month    Chronic pain syndrome       amLODIPine 5 MG tablet    NORVASC    30 tablet    Take 1 tablet (5 mg) by mouth daily    Benign essential HTN       aspirin 81 MG EC tablet     90 tablet     Take 1 tablet (81 mg) by mouth daily    Coronary artery disease involving native coronary artery of native heart with unstable angina pectoris (H)       ezetimibe 10 MG tablet    ZETIA    90 tablet    Take 1 tablet (10 mg) by mouth daily    Hyperlipidemia LDL goal <70       gabapentin 600 MG tablet    NEURONTIN    270 tablet    TAKE THREE TABLETS BY MOUTH THREE TIMES A DAY    Neuropathy       ipratropium - albuterol 0.5 mg/2.5 mg/3 mL 0.5-2.5 (3) MG/3ML neb solution    DUONEB    30 vial    Take 1 vial (3 mLs) by nebulization every 4 hours as needed for shortness of breath / dyspnea    Asthma with acute exacerbation, unspecified asthma severity       lisinopril 10 MG tablet    PRINIVIL/ZESTRIL    90 tablet    Take 2 tablets (20 mg) by mouth daily    Essential hypertension with goal blood pressure less than 140/90, ACS (acute coronary syndrome) (H)       MAPAP 325 MG tablet   Generic drug:  acetaminophen     100 tablet    TAKE TWO TABLETS BY MOUTH EVERY 4 HOURS AS NEEDED FOR MILD PAIN    Trigeminal neuralgia       melatonin 3 MG tablet     30 tablet    TAKE ONE TABLET BY MOUTH NIGHTLY AS NEEDED FOR SLEEP    Trigeminal neuralgia       nitroGLYcerin 0.4 MG sublingual tablet    NITROSTAT    25 tablet    For chest pain place 1 tablet under the tongue every 5 minutes for 3 doses. If symptoms persist 5 minutes after 1st dose call 911.    Coronary artery disease involving native coronary artery of native heart with unstable angina pectoris (H)       oxyCODONE 5 MG IR tablet    ROXICODONE    70 tablet    Take 1-2 tablets (5-10 mg) by mouth every 6 hours as needed for pain Max 3 per day    Neuropathy       prasugrel 10 MG Tabs tablet    EFFIENT    90 tablet    Take 1 tablet (10 mg) by mouth daily    ACS (acute coronary syndrome) (H), Coronary artery disease involving native coronary artery of native heart with unstable angina pectoris (H)       pregabalin 50 MG capsule    LYRICA    90 capsule    Take 1 capsule (50 mg) by mouth  3 times daily    Chronic pain syndrome       traMADol 50 MG tablet    ULTRAM    180 tablet    Take 1-2 tablets ( mg) by mouth 3 times daily as needed for moderate pain    Neuropathy       VENTOLIN  (90 BASE) MCG/ACT Inhaler   Generic drug:  albuterol     18 g    INHALE 2 PUFFS INTO THE LUNGS EVERY 4 HOURS AS NEEDED FOR SHORTNESS OF BREATH OR DIFFICULTY BREATHING    Asthma with acute exacerbation, unspecified asthma severity

## 2017-10-06 NOTE — PROGRESS NOTES
SUBJECTIVE:                                                    Cecil Vasquez is a 46 year old male who presents to clinic today for the following health issues:    Chief Complaint   Patient presents with     RECHECK     visit from pain clinic, stated that they thought he was undermedicated and they recommended a surgery.      Flu Shot      Returns for recheck. He has known neuropathic facial pain. The recent consultation thought it was trigeminal nature, although his recent neurosurgeon did not think so. We have been upward titrating his gabapentin without sufficient relief. He remains on tramadol and Percocet. The pain clinic is now recommending something more long-acting such as methadone. And an injection. He wanted to discuss that. Reviewed his blood pressure which is high today. His care is complicated by significant anxiety disorder      ROS:  Constitutional, HEENT, cardiovascular, pulmonary, gi and gu systems are negative, except as otherwise noted.      OBJECTIVE:                                                    BP (!) 154/92 (BP Location: Left arm, Patient Position: Chair, Cuff Size: Adult Regular)  Pulse 121  Temp 97.9  F (36.6  C) (Temporal)  Wt 256 lb 8 oz (116.3 kg)  SpO2 95%  BMI 37.88 kg/m2  Body mass index is 37.88 kg/(m^2).    No exam    Diagnostic Test Results:  none      ASSESSMENT/PLAN:                                                        ICD-10-CM    1. Anxiety F41.9    2. Chronic pain syndrome G89.4 pregabalin (LYRICA) 50 MG capsule     Drug  Screen Comprehensive , Urine with Reported Meds (MedTox) (Pain Care Package)     CANCELED: Drug Abuse Scr  Ur w Qnt Reflx   3. Essential hypertension with goal blood pressure less than 140/90 I10 lisinopril (PRINIVIL/ZESTRIL) 10 MG tablet   4. Neuropathy G62.9    5. ACS (acute coronary syndrome) (H) I24.9 lisinopril (PRINIVIL/ZESTRIL) 10 MG tablet   6. Need for vaccination Z23 Pneumococcal vaccine 13 valent PCV13 IM (Prevnar)  [69922]     ADMIN: Vaccine, Initial (17943)   7. Need for prophylactic vaccination and inoculation against influenza Z23 FLU VAC, SPLIT VIRUS IM > 3 YO (QUADRIVALENT) [10056]     Vaccine Administration, Each Additional [19077]       After discussions chronic pain, he would like to trial Lyrica. This was sent. He may continue on his other oxycodone and tramadol. His blood pressure is uncontrolled and will increase to 20 mg daily. No other medication changes today. He remains on amitriptyline as well. At some point we need to address his anxiety better.    Gonzalo Cole MD  [facility]     Injectable Influenza Immunization Documentation    1.  Is the person to be vaccinated sick today?   No    2. Does the person to be vaccinated have an allergy to a component   of the vaccine?   No    3. Has the person to be vaccinated ever had a serious reaction   to influenza vaccine in the past?   No    4. Has the person to be vaccinated ever had Guillain-Barré syndrome?   No    Form completed by

## 2017-10-06 NOTE — TELEPHONE ENCOUNTER
PA needed on:Lyrica 50mg  Insurance:BCBS  Ins. Phone:[phone]  Patient ID:[id number]  PCN:[facility]  BIN:[id number]    Please let us know when PA is granted/denied. Thank You      Violeta Allen CPhT  [facility]  [phone]

## 2017-10-06 NOTE — NURSING NOTE
"Chief Complaint   Patient presents with     RECHECK     visit from pain clinic, stated that they thought he was undermedicated and they recommended a surgery.        Initial BP (!) 154/92 (BP Location: Left arm, Patient Position: Chair, Cuff Size: Adult Regular)  Pulse 121  Temp 97.9  F (36.6  C) (Temporal)  Wt 256 lb 8 oz (116.3 kg)  SpO2 95%  BMI 37.88 kg/m2 Estimated body mass index is 37.88 kg/(m^2) as calculated from the following:    Height as of 8/22/17: 5' 9\" (1.753 m).    Weight as of this encounter: 256 lb 8 oz (116.3 kg).  Medication Reconciliation: complete   Luiza Austin CMA    Health Maintenance Due   Topic Date Due     INFLUENZA VACCINE (SYSTEM ASSIGNED)  09/01/2017       Health Maintenance reviewed at today's visit patient asked to schedule/complete:   Patient is aware.     "

## 2017-10-10 DIAGNOSIS — G62.9 NEUROPATHY: ICD-10-CM

## 2017-10-10 RX ORDER — ATORVASTATIN CALCIUM 10 MG/1
10 TABLET, FILM COATED ORAL DAILY
Qty: 90 TABLET | Refills: 3 | Status: ON HOLD | OUTPATIENT
Start: 2017-10-10 | End: 2019-10-16

## 2017-10-10 NOTE — TELEPHONE ENCOUNTER
tramadol      Last Written Prescription Date: 09/12/2017  Last Fill Quantity: 180,  # refills: 0   Last Office Visit with G, P or [facility] prescribing provider: 10/06/2017                                         Next 5 appointments (look out 90 days)     Nov 07, 2017  8:20 AM CST   [facility]    [address] [place] [id number] [phone]                  Thank You,  Carlos Menendez, Pharmacy Tech  [facility]

## 2017-10-10 NOTE — TELEPHONE ENCOUNTER
Call placed again to patient requesting he contact us with an update as to if he is getting his Crestor or if he needs to switch to Atorvastatin 10 mg.  Left call back number, asking that he contact us by the end of the week with an update.

## 2017-10-10 NOTE — TELEPHONE ENCOUNTER
Patient returned call regarding his crestor. He stated that his insurance would not cover the crestor and it has not been affordable for him so he has not been taking it. Reviewed recommendation per Dr. Wallace that if crestor is unaffordable to switch to atorvastatin 10 mg daily. Script for atorvastatin sent to [facility] in [place] per pt request. He stated understanding and agreement with this plan. CORRIE Preciado RN

## 2017-10-11 RX ORDER — TRAMADOL HYDROCHLORIDE 50 MG/1
50-100 TABLET ORAL 3 TIMES DAILY PRN
Qty: 180 TABLET | Refills: 0 | Status: ON HOLD | OUTPATIENT
Start: 2017-10-11 | End: 2019-10-16

## 2017-10-13 LAB — PAIN DRUG SCR UR W RPTD MEDS: NORMAL

## 2017-10-25 ENCOUNTER — TELEPHONE (OUTPATIENT)
Dept: FAMILY MEDICINE | Facility: CLINIC | Age: 47
End: 2017-10-25

## 2017-10-25 DIAGNOSIS — G62.9 NEUROPATHY: ICD-10-CM

## 2017-10-25 NOTE — TELEPHONE ENCOUNTER
oxycodone      Last Written Prescription Date: 09/27/2017  Last Fill Quantity: 70,  # refills: 0   Last Office Visit with [facility], [facility] or [facility] prescribing provider: 10/06/2017                                         Next 5 appointments (look out 90 days)     Oct 31, 2017 10:20 AM CDT   [facility]    [address] [place] [id number] [phone]                  Thank You,  Carlos Menendez, Pharmacy Tech  [facility]

## 2017-10-27 RX ORDER — OXYCODONE HYDROCHLORIDE 5 MG/1
5-10 TABLET ORAL EVERY 6 HOURS PRN
Qty: 70 TABLET | Refills: 0 | Status: SHIPPED | OUTPATIENT
Start: 2017-10-27 | End: 2018-02-06

## 2017-10-27 NOTE — TELEPHONE ENCOUNTER
Denial received,  pharmacy, [place] notified. Copy of denial placed on providers desk. Na Murillo LPN

## 2017-10-31 NOTE — TELEPHONE ENCOUNTER
Patient is calling back. He is going to be going out of town and would rather  the hard copy at the . Once this has been addressed can you please call him back to let him know it is ready to be picked up? He can be reached at [phone]. It is OK to leave a detailed message on his voicemail if he does not answer.  Thank you,  Cecille Carrion   for [facility]

## 2017-11-09 ENCOUNTER — TELEPHONE (OUTPATIENT)
Dept: FAMILY MEDICINE | Facility: CLINIC | Age: 47
End: 2017-11-09

## 2017-11-09 NOTE — TELEPHONE ENCOUNTER
Panel Management Review      Patient has the following on his problem list:     Hypertension   Last three blood pressure readings:  BP Readings from Last 3 Encounters:   10/06/17 (!) 154/92   09/27/17 (!) 173/115   08/22/17 132/88     Blood pressure: Passed    HTN Guidelines:  Age 18-59 BP range:  Less than 140/90  Age 60-85 with Diabetes:  Less than 140/90  Age 60-85 without Diabetes:  less than 150/90        Composite cancer screening  Chart review shows that this patient is due/due soon for the following None  Summary:    Patient is due/failing the following:   BP CHECK    Action needed:   Patient needs office visit for nurse bp check.    Type of outreach:    Phone, spoke to patient.  and he stated that he is no longer seeing Dr. Cole.    Questions for provider review:    None                                                                                                                                    Luiza Austin CMA      Chart routed to Care Team .

## 2017-11-20 DIAGNOSIS — N52.9 ERECTILE DYSFUNCTION, UNSPECIFIED ERECTILE DYSFUNCTION TYPE: ICD-10-CM

## 2017-11-20 NOTE — TELEPHONE ENCOUNTER
This medication was last found on file in 06/2016- pharmacy is showing that the last refill was 11/16/17 qty #6.

## 2017-11-21 RX ORDER — SILDENAFIL 100 MG/1
50 TABLET, FILM COATED ORAL DAILY PRN
Qty: 2 TABLET | Refills: 1 | Status: ON HOLD | OUTPATIENT
Start: 2017-11-21 | End: 2019-10-16

## 2018-02-06 ENCOUNTER — APPOINTMENT (OUTPATIENT)
Dept: GENERAL RADIOLOGY | Facility: CLINIC | Age: 48
End: 2018-02-06
Attending: FAMILY MEDICINE
Payer: COMMERCIAL

## 2018-02-06 ENCOUNTER — HOSPITAL ENCOUNTER (EMERGENCY)
Facility: CLINIC | Age: 48
Discharge: HOME | End: 2018-02-06
Attending: FAMILY MEDICINE | Admitting: FAMILY MEDICINE
Payer: COMMERCIAL

## 2018-02-06 VITALS
WEIGHT: 249 LBS | DIASTOLIC BLOOD PRESSURE: 78 MMHG | BODY MASS INDEX: 36.88 KG/M2 | OXYGEN SATURATION: 96 % | TEMPERATURE: 98.5 F | HEART RATE: 78 BPM | SYSTOLIC BLOOD PRESSURE: 152 MMHG | RESPIRATION RATE: 18 BRPM | HEIGHT: 69 IN

## 2018-02-06 DIAGNOSIS — S33.5XXA LUMBAR SPRAIN, INITIAL ENCOUNTER: ICD-10-CM

## 2018-02-06 LAB
ALBUMIN UR-MCNC: NEGATIVE MG/DL
APPEARANCE UR: ABNORMAL
BILIRUB UR QL STRIP: NEGATIVE
COLOR UR AUTO: YELLOW
GLUCOSE UR STRIP-MCNC: NEGATIVE MG/DL
HGB UR QL STRIP: NEGATIVE
KETONES UR STRIP-MCNC: NEGATIVE MG/DL
LEUKOCYTE ESTERASE UR QL STRIP: NEGATIVE
MUCOUS THREADS #/AREA URNS LPF: PRESENT /LPF
NITRATE UR QL: NEGATIVE
PH UR STRIP: 9 PH (ref 5–7)
RBC #/AREA URNS AUTO: 1 /HPF (ref 0–2)
SOURCE: ABNORMAL
SP GR UR STRIP: 1.02 (ref 1–1.03)
SQUAMOUS #/AREA URNS AUTO: <1 /HPF (ref 0–1)
UROBILINOGEN UR STRIP-MCNC: 0 MG/DL (ref 0–2)
WBC #/AREA URNS AUTO: 3 /HPF (ref 0–2)

## 2018-02-06 PROCEDURE — 25000128 H RX IP 250 OP 636: Performed by: FAMILY MEDICINE

## 2018-02-06 PROCEDURE — 81001 URINALYSIS AUTO W/SCOPE: CPT | Performed by: FAMILY MEDICINE

## 2018-02-06 PROCEDURE — 99284 EMERGENCY DEPT VISIT MOD MDM: CPT | Mod: Z6 | Performed by: FAMILY MEDICINE

## 2018-02-06 PROCEDURE — 74019 RADEX ABDOMEN 2 VIEWS: CPT | Mod: TC

## 2018-02-06 PROCEDURE — 99284 EMERGENCY DEPT VISIT MOD MDM: CPT | Performed by: FAMILY MEDICINE

## 2018-02-06 PROCEDURE — 96372 THER/PROPH/DIAG INJ SC/IM: CPT | Performed by: FAMILY MEDICINE

## 2018-02-06 RX ORDER — KETOROLAC TROMETHAMINE 30 MG/ML
60 INJECTION, SOLUTION INTRAMUSCULAR; INTRAVENOUS ONCE
Status: COMPLETED | OUTPATIENT
Start: 2018-02-06 | End: 2018-02-06

## 2018-02-06 RX ORDER — CYCLOBENZAPRINE HCL 5 MG
5 TABLET ORAL 3 TIMES DAILY PRN
Qty: 15 TABLET | Refills: 0 | Status: SHIPPED | OUTPATIENT
Start: 2018-02-06 | End: 2018-02-12

## 2018-02-06 RX ADMIN — KETOROLAC TROMETHAMINE 60 MG: 30 INJECTION, SOLUTION INTRAMUSCULAR at 12:17

## 2018-02-06 NOTE — ED AVS SNAPSHOT
[facility] Emergency Department    [address] [place] [address]    Phone:  [phone]    Fax:  [phone]                                       Cecil Vasquez   MRN: [id number]    Department:  [facility] Emergency Department   Date of Visit:  2/6/2018           After Visit Summary Signature Page     I have received my discharge instructions, and my questions have been answered. I have discussed any challenges I see with this plan with the nurse or doctor.    ..........................................................................................................................................  Patient/Patient Representative Signature      ..........................................................................................................................................  Patient Representative Print Name and Relationship to Patient    ..................................................               ................................................  Date                                            Time    ..........................................................................................................................................  Reviewed by Signature/Title    ...................................................              ..............................................  Date                                                            Time

## 2018-02-06 NOTE — ED PROVIDER NOTES
History     Chief Complaint   Patient presents with     Flank Pain     HPI  Cecil Vasquez is a 47 year old male who presents with right flank pain that started on Sunday.  Patient has had history of kidney stones and this feels somewhat similar but a lot worse.  Patient states the pain is there constantly but bending over and touching the area makes the pain worse, not much makes it better.  Patient denies any nausea or any vomiting.  In the last 24 hours he has developed some dysuria, and frequency.  Patient denies any change in bowel movements.    Problem List:    Patient Active Problem List    Diagnosis Date Noted     Chronic pain syndrome 03/23/2017     Priority: Medium     Broken contract : marijuana and fill through outside provider 11/10/2017          CAD-  PHYLLIS to prox LAD for 99% stenosis, Remaining 60% distal rca 02/28/2017     Priority: Medium     ACS (acute coronary syndrome) (H) 02/21/2017     Priority: Medium     Neuropathy 12/21/2016     Priority: Medium     Essential hypertension with goal blood pressure less than 140/90 06/24/2016     Priority: Medium     Nephrolithiasis 03/22/2016     Priority: Medium     Rosacea 11/19/2015     Priority: Medium     Obesity 06/29/2015     Priority: Medium     Depression, major, recurrent, in remission (H) 01/29/2015     Priority: Medium     Attention deficit hyperactivity disorder (ADHD) 01/13/2015     Priority: Medium     Problem list name updated by automated process. Provider to review       Impaired fasting glucose 09/09/2011     Priority: Medium     Fasting . Recheck BG and A1c in 3 months.       Bipolar disorder (H) 08/18/2011     Priority: Medium     Diagnosed at age 15.   Prozac and lithium didn't help.  Depakote for 10 years which worked well.  Problem list name updated by automated process. Provider to review       Intermittent asthma 08/18/2011     Priority: Medium        Past Medical History:    Past Medical History:   Diagnosis Date     ADHD  (attention deficit hyperactivity disorder)      Bipolar 1 disorder (H)      Depression      Gastro-oesophageal reflux disease      Hyperlipidaemia      Hypertension      Impaired fasting glucose      Nephrolithiasis      Obesity      Reactive airway disease      Renal calcification      Rosacea      Tobacco abuse      Trigeminal neuralgia      Uncomplicated asthma        Past Surgical History:    Past Surgical History:   Procedure Laterality Date     COMBINED CYSTOSCOPY, INSERT CATHETER URETER Left 3/25/2016    Procedure: COMBINED CYSTOSCOPY, INSERT CATHETER URETER;  Surgeon: Jorden Villafana MD;  Location:  OR     COMBINED CYSTOSCOPY, RETROGRADES, URETEROSCOPY, LASER HOLMIUM LITHOTRIPSY URETER(S), INSERT STENT Left 4/13/2016    Procedure: COMBINED CYSTOSCOPY, RETROGRADES, URETEROSCOPY, LASER HOLMIUM LITHOTRIPSY URETER(S), INSERT STENT;  Surgeon: Jorden Villafana MD;  Location:  OR     EXTRACORPOREAL SHOCK WAVE LITHOTRIPSY (ESWL) Left 3/25/2016    Procedure: EXTRACORPOREAL SHOCK WAVE LITHOTRIPSY (ESWL);  Surgeon: Jorden Villafana MD;  Location:  OR     EXTRACORPOREAL SHOCK WAVE LITHOTRIPSY, CYSTOSCOPY, INSERT STENT URETER(S), COMBINED Left 3/25/2016    Procedure: COMBINED EXTRACORPOREAL SHOCK WAVE LITHOTRIPSY, CYSTOSCOPY, INSERT STENT URETER(S);  Surgeon: Jorden Villafana MD;  Location:  OR     GENITOURINARY SURGERY  3 25 2016     Lithotripsy and stent placement     TONSILLECTOMY         Family History:    Family History   Problem Relation Age of Onset     Psychotic Disorder Mother      DIABETES Sister      Psychotic Disorder Sister        Social History:  Marital Status:   [4]  Social History   Substance Use Topics     Smoking status: Former Smoker     Packs/day: 0.50     Types: Cigarettes     Smokeless tobacco: Never Used     Alcohol use No        Medications:      traMADol (ULTRAM) 50 MG tablet   MAPAP 325 MG tablet   sildenafil (VIAGRA) 100 MG tablet   atorvastatin (LIPITOR)  "10 MG tablet   pregabalin (LYRICA) 50 MG capsule   lisinopril (PRINIVIL/ZESTRIL) 10 MG tablet   VENTOLIN  (90 BASE) MCG/ACT Inhaler   gabapentin (NEURONTIN) 600 MG tablet   melatonin 3 MG tablet   nitroGLYcerin (NITROSTAT) 0.4 MG sublingual tablet   amitriptyline (ELAVIL) 10 MG tablet   amLODIPine (NORVASC) 5 MG tablet   ezetimibe (ZETIA) 10 MG tablet   aspirin 81 MG EC tablet   prasugrel (EFFIENT) 10 MG TABS tablet   ipratropium - albuterol 0.5 mg/2.5 mg/3 mL (DUONEB) 0.5-2.5 (3) MG/3ML neb solution         Review of Systems   All other systems reviewed and are negative.      Physical Exam   BP: (!) 176/114  Pulse: 116  Temp: 98.5  F (36.9  C)  Resp: 18  Height: 175.3 cm (5' 9\")  Weight: 112.9 kg (249 lb)  SpO2: 96 %      Physical Exam   Constitutional: He is oriented to person, place, and time. He appears well-developed and well-nourished. No distress.   HENT:   Head: Normocephalic and atraumatic.   Nose: Nose normal.   Mouth/Throat: Oropharynx is clear and moist. No oropharyngeal exudate.   Eyes: Conjunctivae are normal. Pupils are equal, round, and reactive to light. No scleral icterus.   Neck: Normal range of motion.   Cardiovascular: Normal rate, regular rhythm, normal heart sounds and intact distal pulses.  Exam reveals no friction rub.    No murmur heard.  Pulmonary/Chest: Effort normal and breath sounds normal. No respiratory distress. He has no wheezes. He has no rales.   Abdominal: Soft. Bowel sounds are normal. He exhibits no distension and no mass. There is no tenderness. There is no rebound and no guarding.   Musculoskeletal: Normal range of motion. He exhibits no edema or tenderness.   Neurological: He is alert and oriented to person, place, and time.   Skin: Skin is warm. No rash noted. He is not diaphoretic.   Psychiatric: Judgment normal.   Nursing note and vitals reviewed.      ED Course     ED Course     Procedures           Results for orders placed or performed during the hospital " encounter of 02/06/18   UA reflex to Microscopic and Culture   Result Value Ref Range    Color Urine Yellow     Appearance Urine Slightly Cloudy     Glucose Urine Negative NEG^Negative mg/dL    Bilirubin Urine Negative NEG^Negative    Ketones Urine Negative NEG^Negative mg/dL    Specific Gravity Urine 1.021 1.003 - 1.035    Blood Urine Negative NEG^Negative    pH Urine 9.0 (H) 5.0 - 7.0 pH    Protein Albumin Urine Negative NEG^Negative mg/dL    Urobilinogen mg/dL 0.0 0.0 - 2.0 mg/dL    Nitrite Urine Negative NEG^Negative    Leukocyte Esterase Urine Negative NEG^Negative    Source Unspecified Urine     RBC Urine 1 0 - 2 /HPF    WBC Urine 3 (H) 0 - 2 /HPF    Squamous Epithelial /HPF Urine <1 0 - 1 /HPF    Mucous Urine Present (A) NEG^Negative /LPF     Urine showed no signs of any blood.  An x-ray of the KUB showed no signs of stones.  At this point I think the pain is more muscular in nature, patient admits now that he did a lot of lifting over the weekend.  I recommend that he discontinued symptomatic care.  He was given a muscle relaxant to use in case the pain gets bad again.    Assessments & Plan (with Medical Decision Making)  Lumbar strain     I have reviewed the nursing notes.    I have reviewed the findings, diagnosis, plan and need for follow up with the patient.        2/6/2018   [facility] EMERGENCY DEPARTMENT     Jean Sue MD  02/06/18 8614

## 2018-02-06 NOTE — ED AVS SNAPSHOT
[facility] Emergency Department    [address] [place] [address]    Phone:  [phone]    Fax:  [phone]                                       Cecil Vasquez   MRN: [id number]    Department:  [facility] Emergency Department   Date of Visit:  2/6/2018           Patient Information     Date Of Birth          1970        Your diagnoses for this visit were:     Lumbar sprain, initial encounter        You were seen by Jean Sue MD.      Follow-up Information     Follow up with Sunil Khan MD. Schedule an appointment as soon as possible for a visit in 3 days.    Why:  If not improving.    Contact information:    Critical access hospital  [address] [id number] [phone]        Discharge References/Attachments     FLANK PAIN, UNCERTAIN CAUSE (ENGLISH)      24 Hour Appointment Hotline       To make an appointment at any [facility] call [phone] ([phone]). If you don't have a family doctor or clinic, we will help you find one. [facility] are conveniently located to serve the needs of you and your family.             Review of your medicines      START taking        Dose / Directions Last dose taken    cyclobenzaprine 5 MG tablet   Commonly known as:  FLEXERIL   Dose:  5 mg   Quantity:  15 tablet        Take 1 tablet (5 mg) by mouth 3 times daily as needed for muscle spasms   Refills:  0          Our records show that you are taking the medicines listed below. If these are incorrect, please call your family doctor or clinic.        Dose / Directions Last dose taken    amitriptyline 10 MG tablet   Commonly known as:  ELAVIL   Quantity:  90 tablet        Start at 1 tab at bedtime for 3 days, then 2 tabs at bedtime for 3 days, then 3 tabs at bedtime.  Plan to increase dose to 50-75 mg at bedtime after 1 month   Refills:  0        amLODIPine 5 MG tablet   Commonly known as:  NORVASC   Dose:  5 mg   Quantity:  30 tablet        Take  1 tablet (5 mg) by mouth daily   Refills:  11        aspirin 81 MG EC tablet   Dose:  81 mg   Quantity:  90 tablet        Take 1 tablet (81 mg) by mouth daily   Refills:  0        atorvastatin 10 MG tablet   Commonly known as:  LIPITOR   Dose:  10 mg   Quantity:  90 tablet        Take 1 tablet (10 mg) by mouth daily   Refills:  3        ezetimibe 10 MG tablet   Commonly known as:  ZETIA   Dose:  10 mg   Quantity:  90 tablet        Take 1 tablet (10 mg) by mouth daily   Refills:  3        gabapentin 600 MG tablet   Commonly known as:  NEURONTIN   Quantity:  270 tablet        TAKE THREE TABLETS BY MOUTH THREE TIMES A DAY   Refills:  3        ipratropium - albuterol 0.5 mg/2.5 mg/3 mL 0.5-2.5 (3) MG/3ML neb solution   Commonly known as:  DUONEB   Dose:  1 vial   Quantity:  30 vial        Take 1 vial (3 mLs) by nebulization every 4 hours as needed for shortness of breath / dyspnea   Refills:  0        lisinopril 10 MG tablet   Commonly known as:  PRINIVIL/ZESTRIL   Dose:  20 mg   Quantity:  90 tablet        Take 2 tablets (20 mg) by mouth daily   Refills:  3        MAPAP 325 MG tablet   Quantity:  100 tablet   Generic drug:  acetaminophen        TAKE TWO TABLETS BY MOUTH EVERY 4 HOURS AS NEEDED FOR MILD PAIN   Refills:  11        melatonin 3 MG tablet   Quantity:  30 tablet        TAKE ONE TABLET BY MOUTH NIGHTLY AS NEEDED FOR SLEEP   Refills:  1        nitroGLYcerin 0.4 MG sublingual tablet   Commonly known as:  NITROSTAT   Quantity:  25 tablet        For chest pain place 1 tablet under the tongue every 5 minutes for 3 doses. If symptoms persist 5 minutes after 1st dose call 911.   Refills:  3        prasugrel 10 MG Tabs tablet   Commonly known as:  EFFIENT   Dose:  10 mg   Quantity:  90 tablet        Take 1 tablet (10 mg) by mouth daily   Refills:  3        pregabalin 50 MG capsule   Commonly known as:  LYRICA   Dose:  50 mg   Quantity:  90 capsule        Take 1 capsule (50 mg) by mouth 3 times daily   Refills:  1         sildenafil 100 MG tablet   Commonly known as:  VIAGRA   Dose:  50 mg   Quantity:  2 tablet        Take 0.5 tablets (50 mg) by mouth daily as needed Take 30 min to 4 hours before intercourse.  Never use with nitroglycerin, terazosin or doxazosin.   Refills:  1        traMADol 50 MG tablet   Commonly known as:  ULTRAM   Dose:   mg   Quantity:  180 tablet        Take 1-2 tablets ( mg) by mouth 3 times daily as needed for moderate pain   Refills:  0        VENTOLIN  (90 BASE) MCG/ACT Inhaler   Quantity:  18 g   Generic drug:  albuterol        INHALE 2 PUFFS INTO THE LUNGS EVERY 4 HOURS AS NEEDED FOR SHORTNESS OF BREATH OR DIFFICULTY BREATHING   Refills:  5                Prescriptions were sent or printed at these locations (1 Prescription)                   [facility] [place], [place] [facility] [address]    [address] , [facility] [place] [facility] [address]    Telephone:  [phone]   Fax:  [phone]   Hours:                  Printed at Department/Unit printer (1 of 1)         cyclobenzaprine (FLEXERIL) 5 MG tablet                Procedures and tests performed during your visit     KUB XR    UA reflex to Microscopic and Culture      Orders Needing Specimen Collection     None      Pending Results     Date and Time Order Name Status Description    2/6/2018 1300 KUB XR In process             Pending Culture Results     No orders found from 2/4/2018 to 2/7/2018.            Pending Results Instructions     If you had any lab results that were not finalized at the time of your Discharge, you can call the ED Lab Result RN at [phone]. You will be contacted by this team for any positive Lab results or changes in treatment. The nurses are available 7 days a week from 10A to 6:30P.  You can leave a message 24 hours per day and they will return your call.        Thank you for choosing [facility]       Thank you for choosing [facility] for your care. Our goal is always to provide you with excellent  "care. Hearing back from our patients is one way we can continue to improve our services. Please take a few minutes to complete the written survey that you may receive in the mail after you visit with us. Thank you!        [employer] Information     [employer] lets you send messages to your doctor, view your test results, renew your prescriptions, schedule appointments and more. To sign up, go to [email or web address] . Click on \"Log in\" on the left side of the screen, which will take you to the Welcome page. Then click on \"Sign up Now\" on the right side of the page.     You will be asked to enter the access code listed below, as well as some personal information. Please follow the directions to create your username and password.     Your access code is: [email or web address]  Expires: 2018  1:52 PM     Your access code will  in 90 days. If you need help or a new code, please call your [facility] clinic or [phone].        Care EveryWhere ID     This is your Care EveryWhere ID. This could be used by other organizations to access your [facility] medical records  [id number]        Equal Access to Services     [facility] SURESH : Hadii jeronimo Ponce, ramo bright, janak christian, david meraz . So [facility] [phone].    ATENCIÓN: Si habla español, tiene a maynard disposición servicios gratuitos de asistencia lingüística. Llame al [phone].    We comply with applicable federal civil rights laws and [place] laws. We do not discriminate on the basis of race, color, national origin, age, disability, sex, sexual orientation, or gender identity.            After Visit Summary       This is your record. Keep this with you and show to your community pharmacist(s) and doctor(s) at your next visit.                  "

## 2018-02-06 NOTE — ED NOTES
He has R flank pain that started on Sunday.  He had kidney stones approximately 2 years ago and this is similar pain but much more severe. He is also having trouble urinating.

## 2018-04-09 ENCOUNTER — TELEPHONE (OUTPATIENT)
Dept: CARDIOLOGY | Facility: CLINIC | Age: 48
End: 2018-04-09

## 2018-04-09 NOTE — TELEPHONE ENCOUNTER
Patient is due for fasting lipid/ALT. Left patient detailed message and return phone number to call back.

## 2018-07-23 ENCOUNTER — APPOINTMENT (OUTPATIENT)
Dept: CT IMAGING | Facility: CLINIC | Age: 48
End: 2018-07-23
Attending: NURSE PRACTITIONER
Payer: COMMERCIAL

## 2018-07-23 ENCOUNTER — HOSPITAL ENCOUNTER (EMERGENCY)
Facility: CLINIC | Age: 48
Discharge: HOME | End: 2018-07-23
Attending: NURSE PRACTITIONER | Admitting: NURSE PRACTITIONER
Payer: COMMERCIAL

## 2018-07-23 VITALS
TEMPERATURE: 98.2 F | DIASTOLIC BLOOD PRESSURE: 84 MMHG | HEART RATE: 86 BPM | OXYGEN SATURATION: 97 % | SYSTOLIC BLOOD PRESSURE: 120 MMHG | RESPIRATION RATE: 18 BRPM | BODY MASS INDEX: 34.7 KG/M2 | WEIGHT: 235 LBS

## 2018-07-23 DIAGNOSIS — N20.1 URETERAL STONE: ICD-10-CM

## 2018-07-23 LAB
ALBUMIN UR-MCNC: 30 MG/DL
APPEARANCE UR: CLEAR
BILIRUB UR QL STRIP: NEGATIVE
COLOR UR AUTO: YELLOW
GLUCOSE UR STRIP-MCNC: NEGATIVE MG/DL
HGB UR QL STRIP: ABNORMAL
KETONES UR STRIP-MCNC: NEGATIVE MG/DL
LEUKOCYTE ESTERASE UR QL STRIP: NEGATIVE
NITRATE UR QL: NEGATIVE
PH UR STRIP: 6 PH (ref 5–7)
RBC #/AREA URNS AUTO: >182 /HPF (ref 0–2)
SOURCE: ABNORMAL
SP GR UR STRIP: 1.02 (ref 1–1.03)
SQUAMOUS #/AREA URNS AUTO: <1 /HPF (ref 0–1)
UROBILINOGEN UR STRIP-MCNC: 0 MG/DL (ref 0–2)
WBC #/AREA URNS AUTO: 4 /HPF (ref 0–5)

## 2018-07-23 PROCEDURE — 99285 EMERGENCY DEPT VISIT HI MDM: CPT | Mod: Z6 | Performed by: NURSE PRACTITIONER

## 2018-07-23 PROCEDURE — 96361 HYDRATE IV INFUSION ADD-ON: CPT | Performed by: NURSE PRACTITIONER

## 2018-07-23 PROCEDURE — 25000128 H RX IP 250 OP 636: Performed by: NURSE PRACTITIONER

## 2018-07-23 PROCEDURE — 81001 URINALYSIS AUTO W/SCOPE: CPT | Performed by: EMERGENCY MEDICINE

## 2018-07-23 PROCEDURE — 25000132 ZZH RX MED GY IP 250 OP 250 PS 637: Performed by: NURSE PRACTITIONER

## 2018-07-23 PROCEDURE — 96374 THER/PROPH/DIAG INJ IV PUSH: CPT | Performed by: NURSE PRACTITIONER

## 2018-07-23 PROCEDURE — 74176 CT ABD & PELVIS W/O CONTRAST: CPT

## 2018-07-23 PROCEDURE — 99284 EMERGENCY DEPT VISIT MOD MDM: CPT | Mod: 25 | Performed by: NURSE PRACTITIONER

## 2018-07-23 PROCEDURE — 87086 URINE CULTURE/COLONY COUNT: CPT | Performed by: NURSE PRACTITIONER

## 2018-07-23 RX ORDER — KETOROLAC TROMETHAMINE 30 MG/ML
30 INJECTION, SOLUTION INTRAMUSCULAR; INTRAVENOUS ONCE
Status: COMPLETED | OUTPATIENT
Start: 2018-07-23 | End: 2018-07-23

## 2018-07-23 RX ORDER — TAMSULOSIN HYDROCHLORIDE 0.4 MG/1
0.4 CAPSULE ORAL ONCE
Status: COMPLETED | OUTPATIENT
Start: 2018-07-23 | End: 2018-07-23

## 2018-07-23 RX ORDER — OXYCODONE AND ACETAMINOPHEN 5; 325 MG/1; MG/1
1-2 TABLET ORAL EVERY 6 HOURS PRN
Qty: 12 TABLET | Refills: 0 | Status: SHIPPED | OUTPATIENT
Start: 2018-07-23 | End: 2018-08-25

## 2018-07-23 RX ORDER — TAMSULOSIN HYDROCHLORIDE 0.4 MG/1
0.4 CAPSULE ORAL DAILY
Qty: 10 CAPSULE | Refills: 0 | Status: SHIPPED | OUTPATIENT
Start: 2018-07-23 | End: 2018-08-02

## 2018-07-23 RX ADMIN — KETOROLAC TROMETHAMINE 30 MG: 30 INJECTION, SOLUTION INTRAMUSCULAR at 19:02

## 2018-07-23 RX ADMIN — SODIUM CHLORIDE 1000 ML: 9 INJECTION, SOLUTION INTRAVENOUS at 19:02

## 2018-07-23 RX ADMIN — TAMSULOSIN HYDROCHLORIDE 0.4 MG: 0.4 CAPSULE ORAL at 19:01

## 2018-07-23 ASSESSMENT — ENCOUNTER SYMPTOMS
DIFFICULTY URINATING: 1
HEMATURIA: 1
FLANK PAIN: 1

## 2018-07-23 NOTE — ED PROVIDER NOTES
"  History     Chief Complaint   Patient presents with     Flank Pain     The history is provided by the patient.     Cecil Vasquez is a 47 year old male who presents to the emergency department for flank pain. Patient reports right flank pain started yesterday while at work, pain was so bad he could hardly stand. He states the pain was so \"incredible\" he almost passed out in the kitchen (pt works as a ). Patient states he woke up at 0300 today, \"screaming\" in pain. He states he got up at 0900 and took 2 Tramadol with no relief. Patient reports his urine started to get darker, going from yellow to orange with no flank pain 3 days ago. He states this morning he was urinating bright red blood with mucous. He states later on during the day he tried to urinate and it was difficult to do so. He states he has drank about 30 ounces of water today. Patient reports he has a history of kidney stones about 2 years ago, had them blasted twice. He states because of his history, he tries to drink about 60 ounces of water a day.  Patient denies any groin pain, scrotal pain or swelling.    Problem List:    Patient Active Problem List    Diagnosis Date Noted     Chronic pain syndrome 03/23/2017     Priority: Medium     Broken contract : marijuana and fill through outside provider 11/10/2017          CAD-  PHYLLIS to prox LAD for 99% stenosis, Remaining 60% distal rca 02/28/2017     Priority: Medium     ACS (acute coronary syndrome) (H) 02/21/2017     Priority: Medium     Neuropathy 12/21/2016     Priority: Medium     Essential hypertension with goal blood pressure less than 140/90 06/24/2016     Priority: Medium     Nephrolithiasis 03/22/2016     Priority: Medium     Rosacea 11/19/2015     Priority: Medium     Obesity 06/29/2015     Priority: Medium     Depression, major, recurrent, in remission (H) 01/29/2015     Priority: Medium     Attention deficit hyperactivity disorder (ADHD) 01/13/2015     Priority: Medium     Problem " list name updated by automated process. Provider to review       Impaired fasting glucose 09/09/2011     Priority: Medium     Fasting . Recheck BG and A1c in 3 months.       Bipolar disorder (H) 08/18/2011     Priority: Medium     Diagnosed at age 15.   Prozac and lithium didn't help.  Depakote for 10 years which worked well.  Problem list name updated by automated process. Provider to review       Intermittent asthma 08/18/2011     Priority: Medium        Past Medical History:    Past Medical History:   Diagnosis Date     ADHD (attention deficit hyperactivity disorder)      Bipolar 1 disorder (H)      Depression      Gastro-oesophageal reflux disease      Hyperlipidaemia      Hypertension      Impaired fasting glucose      Nephrolithiasis      Obesity      Reactive airway disease      Renal calcification      Rosacea      Tobacco abuse      Trigeminal neuralgia      Uncomplicated asthma        Past Surgical History:    Past Surgical History:   Procedure Laterality Date     COMBINED CYSTOSCOPY, INSERT CATHETER URETER Left 3/25/2016    Procedure: COMBINED CYSTOSCOPY, INSERT CATHETER URETER;  Surgeon: Jorden Villafana MD;  Location:  OR     COMBINED CYSTOSCOPY, RETROGRADES, URETEROSCOPY, LASER HOLMIUM LITHOTRIPSY URETER(S), INSERT STENT Left 4/13/2016    Procedure: COMBINED CYSTOSCOPY, RETROGRADES, URETEROSCOPY, LASER HOLMIUM LITHOTRIPSY URETER(S), INSERT STENT;  Surgeon: Jorden Villafana MD;  Location:  OR     EXTRACORPOREAL SHOCK WAVE LITHOTRIPSY (ESWL) Left 3/25/2016    Procedure: EXTRACORPOREAL SHOCK WAVE LITHOTRIPSY (ESWL);  Surgeon: Jorden Villafana MD;  Location:  OR     EXTRACORPOREAL SHOCK WAVE LITHOTRIPSY, CYSTOSCOPY, INSERT STENT URETER(S), COMBINED Left 3/25/2016    Procedure: COMBINED EXTRACORPOREAL SHOCK WAVE LITHOTRIPSY, CYSTOSCOPY, INSERT STENT URETER(S);  Surgeon: Jorden Villafana MD;  Location:  OR     GENITOURINARY SURGERY  3 25 2016     Lithotripsy and stent  placement     TONSILLECTOMY         Family History:    Family History   Problem Relation Age of Onset     Psychotic Disorder Mother      Diabetes Sister      Psychotic Disorder Sister        Social History:  Marital Status:   [4]  Social History   Substance Use Topics     Smoking status: Former Smoker     Packs/day: 0.50     Types: Cigarettes     Smokeless tobacco: Never Used     Alcohol use No        Medications:      amitriptyline (ELAVIL) 10 MG tablet   amLODIPine (NORVASC) 5 MG tablet   aspirin 81 MG EC tablet   atorvastatin (LIPITOR) 10 MG tablet   ezetimibe (ZETIA) 10 MG tablet   gabapentin (NEURONTIN) 600 MG tablet   ipratropium - albuterol 0.5 mg/2.5 mg/3 mL (DUONEB) 0.5-2.5 (3) MG/3ML neb solution   lisinopril (PRINIVIL/ZESTRIL) 10 MG tablet   MAPAP 325 MG tablet   melatonin 3 MG tablet   nitroGLYcerin (NITROSTAT) 0.4 MG sublingual tablet   prasugrel (EFFIENT) 10 MG TABS tablet   pregabalin (LYRICA) 50 MG capsule   sildenafil (VIAGRA) 100 MG tablet   traMADol (ULTRAM) 50 MG tablet   VENTOLIN  (90 BASE) MCG/ACT Inhaler         Review of Systems   Genitourinary: Positive for difficulty urinating, flank pain (right flank pain) and hematuria (bright red urine with mucous).   All other systems reviewed and are negative.      Physical Exam   BP: 120/84  Heart Rate: 86  Temp: 98.2  F (36.8  C)  Resp: 18  Weight: 106.6 kg (235 lb)  SpO2: 97 %      Physical Exam   Constitutional: He is oriented to person, place, and time. He appears well-developed and well-nourished. No distress.   HENT:   Head: Normocephalic and atraumatic.   Mouth/Throat: Oropharynx is clear and moist. No oropharyngeal exudate.   Eyes: Pupils are equal, round, and reactive to light. No scleral icterus.   Neck: Normal range of motion. Neck supple.   Cardiovascular: Normal rate, regular rhythm, normal heart sounds and intact distal pulses.    Pulmonary/Chest: Effort normal and breath sounds normal. No respiratory distress. He has no  wheezes. He has no rales.   Abdominal: Soft. Bowel sounds are normal. There is no tenderness. There is no rebound and no guarding.   Musculoskeletal: Normal range of motion. He exhibits no edema or tenderness.   Lymphadenopathy:     He has no cervical adenopathy.   Neurological: He is alert and oriented to person, place, and time.   Skin: Skin is warm and dry. No rash noted. He is not diaphoretic.   Psychiatric: He has a normal mood and affect. His behavior is normal. Judgment normal.   Nursing note and vitals reviewed.      ED Course     ED Course     Procedures      Results for orders placed or performed during the hospital encounter of 07/23/18 (from the past 24 hour(s))   Routine UA with microscopic   Result Value Ref Range    Color Urine Yellow     Appearance Urine Clear     Glucose Urine Negative NEG^Negative mg/dL    Bilirubin Urine Negative NEG^Negative    Ketones Urine Negative NEG^Negative mg/dL    Specific Gravity Urine 1.016 1.003 - 1.035    Blood Urine Large (A) NEG^Negative    pH Urine 6.0 5.0 - 7.0 pH    Protein Albumin Urine 30 (A) NEG^Negative mg/dL    Urobilinogen mg/dL 0.0 0.0 - 2.0 mg/dL    Nitrite Urine Negative NEG^Negative    Leukocyte Esterase Urine Negative NEG^Negative    Source Unspecified Urine     WBC Urine 4 0 - 5 /HPF    RBC Urine >182 (H) 0 - 2 /HPF    Squamous Epithelial /HPF Urine <1 0 - 1 /HPF       Medications   0.9% sodium chloride BOLUS (1,000 mLs Intravenous New Bag 7/23/18 1902)   ketorolac (TORADOL) injection 30 mg (30 mg Intravenous Given 7/23/18 1902)   tamsulosin (FLOMAX) capsule 0.4 mg (0.4 mg Oral Given 7/23/18 1901)       Assessments & Plan (with Medical Decision Making)  Demar is a 47-year-old male with a history of kidney stones who presents to the emergency department today with complaints of right flank pain and alecia hematuria.  Please refer to HPI and focused exam.  Patient arrives here hemodynamically stable and afebrile.  Patient's exam is rather unremarkable,  he has no CVA tenderness.  Urinalysis confirms large amounts of blood with greater than 182 red cells, there is no signs of infection.  Urine culture is pending.  Peripheral IV was established, patient was given a liter of normal saline, Toradol for pain after we discussed NSAID use which was per patient approved by his cardiologist.  Patient was given a dose of oral Flomax.  CT scan was obtained and reveals a 3 mm UVJ stone with no hydronephrosis.  Patient is feeling much better here after fluids and Toradol and I feel he is stable to be discharged home.  Given the stone size he should not have any trouble passing this.  Patient was sent home with straining, he can continue with the Flomax starting tomorrow.  I did prescribe patient a small supply of Percocet for severe pain, narcotic safety and side effects were discussed.  Patient to follow-up with primary care next week, reasons to return to the emergency department were discussed in detail.  Patient is agreeable to plan of care and was discharged in stable condition.     I have reviewed the nursing notes.    I have reviewed the findings, diagnosis, plan and need for follow up with the patient.    New Prescriptions    OXYCODONE-ACETAMINOPHEN (PERCOCET) 5-325 MG PER TABLET    Take 1-2 tablets by mouth every 6 hours as needed for pain    TAMSULOSIN (FLOMAX) 0.4 MG CAPSULE    Take 1 capsule (0.4 mg) by mouth daily for 10 doses       Final diagnoses:   Ureteral stone     This document serves as a record of services personally performed by Letha Uribe CNP. It was created on their behalf by Gregoria Cardona, a trained medical scribe. The creation of this record is based on the provider's personal observations and the statements of the patient. This document has been checked and approved by the attending provider.    Note: Chart documentation done in part with Dragon Voice Recognition software. Although reviewed after completion, some word and grammatical errors may  remain.    7/23/2018   [facility] EMERGENCY DEPARTMENT     Ankita Uribe, CIRO KEMP  07/23/18 2007

## 2018-07-23 NOTE — ED TRIAGE NOTES
Pt has hx of stones.  Has had lithotripsy a few years ago.  Two months ago symptoms but no blood in urine so clinic told him it was probably not stones.  Symptoms resolved.  Three days ago urine darkened and he developed right flank pain.  Blood in urine started today.

## 2018-07-23 NOTE — ED AVS SNAPSHOT
[facility] Emergency Department    [address] [place] [address]    Phone:  [phone]    Fax:  [phone]                                       Cecil Vasquez   MRN: [id number]    Department:  [facility] Emergency Department   Date of Visit:  7/23/2018           After Visit Summary Signature Page     I have received my discharge instructions, and my questions have been answered. I have discussed any challenges I see with this plan with the nurse or doctor.    ..........................................................................................................................................  Patient/Patient Representative Signature      ..........................................................................................................................................  Patient Representative Print Name and Relationship to Patient    ..................................................               ................................................  Date                                            Time    ..........................................................................................................................................  Reviewed by Signature/Title    ...................................................              ..............................................  Date                                                            Time

## 2018-07-23 NOTE — ED AVS SNAPSHOT
" [facility] Emergency Department    [address] [place] [address]    Phone:  [phone]    Fax:  [phone]                                       Cecil Vasquez   MRN: [id number]    Department:  [facility] Emergency Department   Date of Visit:  7/23/2018           Patient Information     Date Of Birth          1970        Your diagnoses for this visit were:     None       You were seen by Ankita Uribe, CIRO CNP.      Follow-up Information     Follow up with Sunil Khan MD In 1 week.    Contact information:    [facility]  [address] [address]  [phone]          Follow up with [facility] Emergency [facility].    Specialty:  EMERGENCY MEDICINE    Why:  If symptoms worsen    Contact information:    [address] [place] [address] [phone]    Additional information:    From [address]: Exit at [address] on [place] side of [place]. Turn right on [address]. Turn left at stoplight on [address]. [facility] will be in view two blocks ahead        Discharge Instructions          * KIDNEY STONE (w/ Colic)    The sharp cramping pain and nausea/vomiting that you have is due to a small stone which has formed in the kidney and is now passing down a narrow tube (ureter) on its way to your bladder. Once it reaches your bladder, the pain will stop. The stone may pass in your urine stream in one piece. [The size may be 1/16\" to 1/4\" (1-6mm)]. Or, the stone may also break up into roc fragments which you may not even notice.  Once you have had a kidney stone there is a risk for recurrence in the future.  HOME CARE:      Drink lots of fluid (at least 8-10 glasses of water a day).    Most stones will pass on their own, but may take from a few hours to a few days.    Each time you urinate, do so in a jar. Pour the urine from the jar through the strainer and into the toilet. Continue doing this until 24 " hours after your pain stops. By then, if there was a kidney stone, it should pass from your bladder. Some stones dissolve into sand-like particles and pass right through the strainer. In that case, you won't ever see a stone.    Save any stone that you find in the strainer and bring it to your doctor for analysis. It may be possible to prevent certain types of stones from forming. Therefore, it is important to know what kind of stone you have.    Try to stay as active as possible since this will help the stone pass. Do not stay in bed unless your pain prevents you from getting up. You may notice a red, pink or brown color to your urine. This is normal while passing a kidney stone.  FOLLOW UP with your doctor or return to this facility if the pain lasts more than 48 hours.  GET PROMPT MEDICAL ATTENTION if any of the following occur:    Pain that is not controlled by the medicine given    Repeated vomiting or unable to keep down fluids    Weakness, dizziness or fainting    Fever over 101  F (38.3  C)    Passage of solid red or brown urine (can't see through it) or urine with lots of blood clots    Unable to pass urine for 8 hours and increasing bladder pressure    5569-0171 [employer] [address] [place] [address]. All rights reserved. This information is not intended as a substitute for professional medical care. Always follow your healthcare professional's instructions.  This information has been modified by your health care provider with permission from the publisher.      24 Hour Appointment Hotline       To make an appointment at any [facility], call [phone] ([phone]). If you don't have a family doctor or clinic, we will help you find one. [facility] are conveniently located to serve the needs of you and your family.             Review of your medicines      START taking        Dose / Directions Last dose taken    oxyCODONE-acetaminophen 5-325 MG per tablet    Commonly known as:  PERCOCET   Dose:  1-2 tablet   Quantity:  12 tablet        Take 1-2 tablets by mouth every 6 hours as needed for pain   Refills:  0        tamsulosin 0.4 MG capsule   Commonly known as:  FLOMAX   Dose:  0.4 mg   Quantity:  10 capsule        Take 1 capsule (0.4 mg) by mouth daily for 10 doses   Refills:  0          Our records show that you are taking the medicines listed below. If these are incorrect, please call your family doctor or clinic.        Dose / Directions Last dose taken    amitriptyline 10 MG tablet   Commonly known as:  ELAVIL   Quantity:  90 tablet        Start at 1 tab at bedtime for 3 days, then 2 tabs at bedtime for 3 days, then 3 tabs at bedtime.  Plan to increase dose to 50-75 mg at bedtime after 1 month   Refills:  0        amLODIPine 5 MG tablet   Commonly known as:  NORVASC   Dose:  5 mg   Quantity:  30 tablet        Take 1 tablet (5 mg) by mouth daily   Refills:  11        aspirin 81 MG EC tablet   Dose:  81 mg   Quantity:  90 tablet        Take 1 tablet (81 mg) by mouth daily   Refills:  0        atorvastatin 10 MG tablet   Commonly known as:  LIPITOR   Dose:  10 mg   Quantity:  90 tablet        Take 1 tablet (10 mg) by mouth daily   Refills:  3        ezetimibe 10 MG tablet   Commonly known as:  ZETIA   Dose:  10 mg   Quantity:  90 tablet        Take 1 tablet (10 mg) by mouth daily   Refills:  3        gabapentin 600 MG tablet   Commonly known as:  NEURONTIN   Quantity:  270 tablet        TAKE THREE TABLETS BY MOUTH THREE TIMES A DAY   Refills:  3        ipratropium - albuterol 0.5 mg/2.5 mg/3 mL 0.5-2.5 (3) MG/3ML neb solution   Commonly known as:  DUONEB   Dose:  1 vial   Quantity:  30 vial        Take 1 vial (3 mLs) by nebulization every 4 hours as needed for shortness of breath / dyspnea   Refills:  0        lisinopril 10 MG tablet   Commonly known as:  PRINIVIL/ZESTRIL   Dose:  20 mg   Quantity:  90 tablet        Take 2 tablets (20 mg) by mouth daily   Refills:  3         MAPAP 325 MG tablet   Quantity:  100 tablet   Generic drug:  acetaminophen        TAKE TWO TABLETS BY MOUTH EVERY 4 HOURS AS NEEDED FOR MILD PAIN   Refills:  11        melatonin 3 MG tablet   Quantity:  30 tablet        TAKE ONE TABLET BY MOUTH NIGHTLY AS NEEDED FOR SLEEP   Refills:  1        nitroGLYcerin 0.4 MG sublingual tablet   Commonly known as:  NITROSTAT   Quantity:  25 tablet        For chest pain place 1 tablet under the tongue every 5 minutes for 3 doses. If symptoms persist 5 minutes after 1st dose call 911.   Refills:  3        prasugrel 10 MG Tabs tablet   Commonly known as:  EFFIENT   Dose:  10 mg   Quantity:  90 tablet        Take 1 tablet (10 mg) by mouth daily   Refills:  3        pregabalin 50 MG capsule   Commonly known as:  LYRICA   Dose:  50 mg   Quantity:  90 capsule        Take 1 capsule (50 mg) by mouth 3 times daily   Refills:  1        sildenafil 100 MG tablet   Commonly known as:  VIAGRA   Dose:  50 mg   Quantity:  2 tablet        Take 0.5 tablets (50 mg) by mouth daily as needed Take 30 min to 4 hours before intercourse.  Never use with nitroglycerin, terazosin or doxazosin.   Refills:  1        traMADol 50 MG tablet   Commonly known as:  ULTRAM   Dose:   mg   Quantity:  180 tablet        Take 1-2 tablets ( mg) by mouth 3 times daily as needed for moderate pain   Refills:  0        VENTOLIN  (90 Base) MCG/ACT Inhaler   Quantity:  18 g   Generic drug:  albuterol        INHALE 2 PUFFS INTO THE LUNGS EVERY 4 HOURS AS NEEDED FOR SHORTNESS OF BREATH OR DIFFICULTY BREATHING   Refills:  5                Information about OPIOIDS     PRESCRIPTION OPIOIDS: WHAT YOU NEED TO KNOW   We gave you an opioid (narcotic) pain medicine. It is important to manage your pain, but opioids are not always the best choice. You should first try all the other options your care team gave you. Take this medicine for as short a time (and as few doses) as possible.     These medicines have  risks:    DO NOT drive when on new or higher doses of pain medicine. These medicines can affect your alertness and reaction times, and you could be arrested for driving under the influence (DUI). If you need to use opioids long-term, talk to your care team about driving.    DO NOT operate heave machinery    DO NOT do any other dangerous activities while taking these medicines.     DO NOT drink any alcohol while taking these medicines.      If the opioid prescribed includes acetaminophen, DO NOT take with any other medicines that contain acetaminophen. Read all labels carefully. Look for the word  acetaminophen  or  Tylenol.  Ask your pharmacist if you have questions or are unsure.    You can get addicted to pain medicines, especially if you have a history of addiction (chemical, alcohol or substance dependence). Talk to your care team about ways to reduce this risk.    Store your pills in a secure place, locked if possible. We will not replace any lost or stolen medicine. If you don t finish your medicine, please throw away (dispose) as directed by your pharmacist. The [place] [employer]ution [employer] has more information about safe disposal: https://[email or web address].[email or web address].[place].us/living-green/managing-unwanted-medications.     All opioids tend to cause constipation. Drink plenty of water and eat foods that have a lot of fiber, such as fruits, vegetables, prune juice, apple juice and high-fiber cereal. Take a laxative (Miralax, milk of magnesia, Colace, Senna) if you don t move your bowels at least every other day.         Prescriptions were sent or printed at these locations (2 Prescriptions)                   [facility] [place] [address] [address] [facility] [facility]    [address] [facility]  [facility] [address]    Telephone:  [phone]   Fax:  [phone]   Hours:                  E-Prescribed (1 of 2)         tamsulosin (FLOMAX) 0.4 MG capsule                 Printed at Department/Unit printer (1 of 2)          "oxyCODONE-acetaminophen (PERCOCET) 5-325 MG per tablet                Procedures and tests performed during your visit     CT Abdomen Pelvis w/o Contrast    Peripheral IV catheter    Routine UA with microscopic    Urine Culture      Orders Needing Specimen Collection     None      Pending Results     Date and Time Order Name Status Description    7/23/2018 1942 Urine Culture In process     7/23/2018 1843 CT Abdomen Pelvis w/o Contrast Preliminary             Pending Culture Results     Date and Time Order Name Status Description    7/23/2018 1942 Urine Culture In process             Pending Results Instructions     If you had any lab results that were not finalized at the time of your Discharge, you can call the ED Lab Result RN at [phone]. You will be contacted by this team for any positive Lab results or changes in treatment. The nurses are available 7 days a week from 10A to 6:30P.  You can leave a message 24 hours per day and they will return your call.        Thank you for choosing [facility]       Thank you for choosing [facility] for your care. Our goal is always to provide you with excellent care. Hearing back from our patients is one way we can continue to improve our services. Please take a few minutes to complete the written survey that you may receive in the mail after you visit with us. Thank you!        [employer] Information     [email or web address] lets you send messages to your doctor, view your test results, renew your prescriptions, schedule appointments and more. To sign up, go to [email or web address] . Click on \"Log in\" on the left side of the screen, which will take you to the Welcome page. Then click on \"Sign up Now\" on the right side of the page.     You will be asked to enter the access code listed below, as well as some personal information. Please follow the directions to create your username and password.     Your access code is: [email or web address]  Expires: 10/21/2018  8:05 PM     Your access code will "  in 90 days. If you need help or a new code, please call your [facility] clinic or [phone].        Care EveryWhere ID     This is your Care EveryWhere ID. This could be used by other organizations to access your [facility] medical records  [id number]        Equal Access to Services     KRISTIE PRINCE : Candelaria he Somathew, waaxda luqadaha, qaybta kaalmabisi christian, david reynoso. So [facility] [phone].    ATENCIÓN: Si habla español, tiene a maynard disposición servicios gratuitos de asistencia lingüística. Llame al [phone].    We comply with applicable federal civil rights laws and [place] laws. We do not discriminate on the basis of race, color, national origin, age, disability, sex, sexual orientation, or gender identity.            After Visit Summary       This is your record. Keep this with you and show to your community pharmacist(s) and doctor(s) at your next visit.

## 2018-07-24 NOTE — DISCHARGE INSTRUCTIONS
"   * KIDNEY STONE (w/ Colic)    The sharp cramping pain and nausea/vomiting that you have is due to a small stone which has formed in the kidney and is now passing down a narrow tube (ureter) on its way to your bladder. Once it reaches your bladder, the pain will stop. The stone may pass in your urine stream in one piece. [The size may be 1/16\" to 1/4\" (1-6mm)]. Or, the stone may also break up into roc fragments which you may not even notice.  Once you have had a kidney stone there is a risk for recurrence in the future.  HOME CARE:      Drink lots of fluid (at least 8-10 glasses of water a day).    Most stones will pass on their own, but may take from a few hours to a few days.    Each time you urinate, do so in a jar. Pour the urine from the jar through the strainer and into the toilet. Continue doing this until 24 hours after your pain stops. By then, if there was a kidney stone, it should pass from your bladder. Some stones dissolve into sand-like particles and pass right through the strainer. In that case, you won't ever see a stone.    Save any stone that you find in the strainer and bring it to your doctor for analysis. It may be possible to prevent certain types of stones from forming. Therefore, it is important to know what kind of stone you have.    Try to stay as active as possible since this will help the stone pass. Do not stay in bed unless your pain prevents you from getting up. You may notice a red, pink or brown color to your urine. This is normal while passing a kidney stone.  FOLLOW UP with your doctor or return to this facility if the pain lasts more than 48 hours.  GET PROMPT MEDICAL ATTENTION if any of the following occur:    Pain that is not controlled by the medicine given    Repeated vomiting or unable to keep down fluids    Weakness, dizziness or fainting    Fever over 101  F (38.3  C)    Passage of solid red or brown urine (can't see through it) or urine with lots of blood " clots    Unable to pass urine for 8 hours and increasing bladder pressure    6894-7236 [employer]. [address] [place] [address], [place] [address]. All rights reserved. This information is not intended as a substitute for professional medical care. Always follow your healthcare professional's instructions.  This information has been modified by your health care provider with permission from the publisher.

## 2018-07-25 LAB
BACTERIA SPEC CULT: NO GROWTH
Lab: NORMAL
SPECIMEN SOURCE: NORMAL

## 2018-08-25 ENCOUNTER — HOSPITAL ENCOUNTER (EMERGENCY)
Facility: CLINIC | Age: 48
Discharge: HOME | End: 2018-08-25
Attending: PHYSICIAN ASSISTANT | Admitting: PHYSICIAN ASSISTANT
Payer: COMMERCIAL

## 2018-08-25 ENCOUNTER — APPOINTMENT (OUTPATIENT)
Dept: CT IMAGING | Facility: CLINIC | Age: 48
End: 2018-08-25
Attending: PHYSICIAN ASSISTANT
Payer: COMMERCIAL

## 2018-08-25 VITALS
OXYGEN SATURATION: 100 % | RESPIRATION RATE: 18 BRPM | DIASTOLIC BLOOD PRESSURE: 72 MMHG | BODY MASS INDEX: 32.49 KG/M2 | SYSTOLIC BLOOD PRESSURE: 126 MMHG | WEIGHT: 220 LBS | TEMPERATURE: 97.7 F | HEART RATE: 69 BPM

## 2018-08-25 DIAGNOSIS — R10.9 LEFT FLANK PAIN: ICD-10-CM

## 2018-08-25 DIAGNOSIS — N20.1 LEFT URETERAL STONE: ICD-10-CM

## 2018-08-25 LAB
ALBUMIN UR-MCNC: NEGATIVE MG/DL
APPEARANCE UR: CLEAR
BILIRUB UR QL STRIP: NEGATIVE
COLOR UR AUTO: YELLOW
GLUCOSE UR STRIP-MCNC: NEGATIVE MG/DL
HGB UR QL STRIP: ABNORMAL
KETONES UR STRIP-MCNC: NEGATIVE MG/DL
LEUKOCYTE ESTERASE UR QL STRIP: NEGATIVE
MUCOUS THREADS #/AREA URNS LPF: PRESENT /LPF
NITRATE UR QL: NEGATIVE
PH UR STRIP: 6 PH (ref 5–7)
RBC #/AREA URNS AUTO: 13 /HPF (ref 0–2)
SOURCE: ABNORMAL
SP GR UR STRIP: 1.02 (ref 1–1.03)
UROBILINOGEN UR STRIP-MCNC: 0 MG/DL (ref 0–2)
WBC #/AREA URNS AUTO: 2 /HPF (ref 0–5)

## 2018-08-25 PROCEDURE — 99284 EMERGENCY DEPT VISIT MOD MDM: CPT | Mod: 25 | Performed by: PHYSICIAN ASSISTANT

## 2018-08-25 PROCEDURE — 74176 CT ABD & PELVIS W/O CONTRAST: CPT

## 2018-08-25 PROCEDURE — 81001 URINALYSIS AUTO W/SCOPE: CPT | Performed by: PHYSICIAN ASSISTANT

## 2018-08-25 PROCEDURE — 99285 EMERGENCY DEPT VISIT HI MDM: CPT | Mod: Z6 | Performed by: PHYSICIAN ASSISTANT

## 2018-08-25 RX ORDER — ONDANSETRON 4 MG/1
4 TABLET, ORALLY DISINTEGRATING ORAL EVERY 8 HOURS PRN
Qty: 10 TABLET | Refills: 0 | Status: SHIPPED | OUTPATIENT
Start: 2018-08-25 | End: 2018-08-28

## 2018-08-25 RX ORDER — TAMSULOSIN HYDROCHLORIDE 0.4 MG/1
0.4 CAPSULE ORAL DAILY
Qty: 10 CAPSULE | Refills: 0 | Status: SHIPPED | OUTPATIENT
Start: 2018-08-25 | End: 2018-09-04

## 2018-08-25 RX ORDER — OXYCODONE AND ACETAMINOPHEN 5; 325 MG/1; MG/1
1-2 TABLET ORAL EVERY 6 HOURS PRN
Qty: 12 TABLET | Refills: 0 | Status: ON HOLD | OUTPATIENT
Start: 2018-08-25 | End: 2019-10-16

## 2018-08-25 NOTE — ED PROVIDER NOTES
History     Chief Complaint   Patient presents with     Flank Pain     flank     The history is provided by the patient.     Cecil Vasquez is a 47 year old male who presents to the emergency department with his wife for concerns of left sided flank pain. Patient reports that for the past 5-6 days he has had constant worsening left sided flank pain. Patient seen his PCP in the last week who ordered an urinalysis that showed a small amount of blood in his urine. He was supposed to have an ultrasound, but due to his work schedule he was unable to make it. He was going to reschedule it for early next week. This morning he states that he woke up with increasingly worse left sided flank pain. Patient took 2 Tramadol this morning he has for trigeminal neuralgia, but this did not relieve his flank pain. Patient has had nausea and vomiting, but this normal for him with other unrelated diagnoses and he says it is unchanged from his baseline. He is drinking fluids often. No dysuria, hematuria, or fever. No changes in bowel movements.      Problem List:    Patient Active Problem List    Diagnosis Date Noted     Chronic pain syndrome 03/23/2017     Priority: Medium     Broken contract : marijuana and fill through outside provider 11/10/2017          CAD-  PHYLLIS to prox LAD for 99% stenosis, Remaining 60% distal rca 02/28/2017     Priority: Medium     ACS (acute coronary syndrome) (H) 02/21/2017     Priority: Medium     Neuropathy 12/21/2016     Priority: Medium     Essential hypertension with goal blood pressure less than 140/90 06/24/2016     Priority: Medium     Nephrolithiasis 03/22/2016     Priority: Medium     Rosacea 11/19/2015     Priority: Medium     Obesity 06/29/2015     Priority: Medium     Depression, major, recurrent, in remission (H) 01/29/2015     Priority: Medium     Attention deficit hyperactivity disorder (ADHD) 01/13/2015     Priority: Medium     Problem list name updated by automated process. Provider to  review       Impaired fasting glucose 09/09/2011     Priority: Medium     Fasting . Recheck BG and A1c in 3 months.       Bipolar disorder (H) 08/18/2011     Priority: Medium     Diagnosed at age 15.   Prozac and lithium didn't help.  Depakote for 10 years which worked well.  Problem list name updated by automated process. Provider to review       Intermittent asthma 08/18/2011     Priority: Medium        Past Medical History:    Past Medical History:   Diagnosis Date     ADHD (attention deficit hyperactivity disorder)      Bipolar 1 disorder (H)      Depression      Gastro-oesophageal reflux disease      Hyperlipidaemia      Hypertension      Impaired fasting glucose      Nephrolithiasis      Obesity      Reactive airway disease      Renal calcification      Rosacea      Tobacco abuse      Trigeminal neuralgia      Uncomplicated asthma        Past Surgical History:    Past Surgical History:   Procedure Laterality Date     COMBINED CYSTOSCOPY, INSERT CATHETER URETER Left 3/25/2016    Procedure: COMBINED CYSTOSCOPY, INSERT CATHETER URETER;  Surgeon: Jorden Villafana MD;  Location:  OR     COMBINED CYSTOSCOPY, RETROGRADES, URETEROSCOPY, LASER HOLMIUM LITHOTRIPSY URETER(S), INSERT STENT Left 4/13/2016    Procedure: COMBINED CYSTOSCOPY, RETROGRADES, URETEROSCOPY, LASER HOLMIUM LITHOTRIPSY URETER(S), INSERT STENT;  Surgeon: Jorden Villafana MD;  Location:  OR     EXTRACORPOREAL SHOCK WAVE LITHOTRIPSY (ESWL) Left 3/25/2016    Procedure: EXTRACORPOREAL SHOCK WAVE LITHOTRIPSY (ESWL);  Surgeon: Jorden Villafana MD;  Location:  OR     EXTRACORPOREAL SHOCK WAVE LITHOTRIPSY, CYSTOSCOPY, INSERT STENT URETER(S), COMBINED Left 3/25/2016    Procedure: COMBINED EXTRACORPOREAL SHOCK WAVE LITHOTRIPSY, CYSTOSCOPY, INSERT STENT URETER(S);  Surgeon: Jorden Villafana MD;  Location:  OR     GENITOURINARY SURGERY  3 25 2016     Lithotripsy and stent placement     TONSILLECTOMY         Family History:     Family History   Problem Relation Age of Onset     Psychotic Disorder Mother      Diabetes Sister      Psychotic Disorder Sister        Social History:  Marital Status:   [4]  Social History   Substance Use Topics     Smoking status: Former Smoker     Packs/day: 0.50     Types: Cigarettes     Smokeless tobacco: Never Used     Alcohol use No        Medications:      amLODIPine (NORVASC) 5 MG tablet   aspirin 81 MG EC tablet   atorvastatin (LIPITOR) 10 MG tablet   ezetimibe (ZETIA) 10 MG tablet   lisinopril (PRINIVIL/ZESTRIL) 10 MG tablet   ondansetron (ZOFRAN ODT) 4 MG ODT tab   oxyCODONE-acetaminophen (PERCOCET) 5-325 MG per tablet   prasugrel (EFFIENT) 10 MG TABS tablet   tamsulosin (FLOMAX) 0.4 MG capsule   traMADol (ULTRAM) 50 MG tablet   amitriptyline (ELAVIL) 10 MG tablet   ipratropium - albuterol 0.5 mg/2.5 mg/3 mL (DUONEB) 0.5-2.5 (3) MG/3ML neb solution   MAPAP 325 MG tablet   melatonin 3 MG tablet   nitroGLYcerin (NITROSTAT) 0.4 MG sublingual tablet   sildenafil (VIAGRA) 100 MG tablet   VENTOLIN  (90 BASE) MCG/ACT Inhaler         Review of Systems   All other systems reviewed and are negative.      Physical Exam   BP: 126/72  Pulse: 69  Temp: 97.7  F (36.5  C)  Resp: 18  Weight: 99.8 kg (220 lb)  SpO2: 100 %      Physical Exam   Constitutional: He is oriented to person, place, and time. He appears well-developed and well-nourished. No distress.   HENT:   Head: Normocephalic and atraumatic.   Mouth/Throat: Oropharynx is clear and moist.   Eyes: Conjunctivae are normal. Pupils are equal, round, and reactive to light. No scleral icterus.   Neck: Neck supple.   Cardiovascular: Normal rate, regular rhythm, normal heart sounds and intact distal pulses.    Pulmonary/Chest: Effort normal and breath sounds normal. No respiratory distress.   Abdominal: Soft. Bowel sounds are normal. There is no tenderness. There is CVA tenderness (left, mild).   Musculoskeletal: Normal range of motion. He exhibits no  deformity.   Neurological: He is alert and oriented to person, place, and time.   Skin: Skin is warm and dry. No rash noted. He is not diaphoretic.   Psychiatric: He has a normal mood and affect.   Nursing note and vitals reviewed.      ED Course     ED Course     Procedures    Results for orders placed or performed during the hospital encounter of 08/25/18 (from the past 24 hour(s))   Routine UA with microscopic   Result Value Ref Range    Color Urine Yellow     Appearance Urine Clear     Glucose Urine Negative NEG^Negative mg/dL    Bilirubin Urine Negative NEG^Negative    Ketones Urine Negative NEG^Negative mg/dL    Specific Gravity Urine 1.019 1.003 - 1.035    Blood Urine Moderate (A) NEG^Negative    pH Urine 6.0 5.0 - 7.0 pH    Protein Albumin Urine Negative NEG^Negative mg/dL    Urobilinogen mg/dL 0.0 0.0 - 2.0 mg/dL    Nitrite Urine Negative NEG^Negative    Leukocyte Esterase Urine Negative NEG^Negative    Source Midstream Urine     WBC Urine 2 0 - 5 /HPF    RBC Urine 13 (H) 0 - 2 /HPF    Mucous Urine Present (A) NEG^Negative /LPF   CT Abdomen Pelvis w/o Contrast    Narrative    CT ABDOMEN AND PELVIS WITHOUT CONTRAST 8/25/2018 1:21 PM    HISTORY: Left flank pain.    TECHNIQUE: Helical axial scans from the dome of liver through the  pubic symphysis without contrast. Radiation dose for this scan was  reduced using automated exposure control, adjustment of the mA and/or  kV according to patient size, or iterative reconstruction technique.    COMPARISON: 7/23/2018.    FINDINGS: 2 mm nonobstructing calculus lower pole collecting system  right kidney without change. Question of a few additional scattered 1  mm nonobstructing calculi in the right renal collecting system. There  is a an 8 mm right renal parenchymal calcification in the lower pole  of uncertain etiology and significance. No right-sided hydronephrosis  or hydroureter. The previously seen tiny calcification within or  adjacent to the right ureterovesical  orifice is no longer identified.  There is a 6 mm calculus in the proximal left ureter, only 3 to 4 cm  distal to the ureteropelvic junction. This is most likely the same  calculus which was a nonobstructing calculus in the lower pole of the  left kidney on the prior exam. There is minimal, if any, left-sided  hydronephrosis and proximal hydroureter. The remainder of the left  ureter is unremarkable.    The liver, spleen, pancreas, bilateral adrenal glands, and the  remainder of the kidneys bilaterally are unremarkable. The bowel and  mesentery in the upper abdomen appear normal. There is a small  periumbilical hernia containing only fat, unchanged.    Scans through the pelvis show no acute abnormality. There is a  retrocecal appendix without appendicitis. No free fluid.      Impression    IMPRESSION:  1. 6 mm calculus within the proximal left ureter with minimal, if any,  hydroureter and hydronephrosis. This calculus was a nonobstructing  calculus in the lower pole collecting system of the left kidney on the  prior exam of 7/23/2018.  2. Tiny nonobstructing calculi right kidney as described above.  3. Small periumbilical are hernia containing fat without change.           Medications - No data to display    Assessments & Plan (with Medical Decision Making)  Cecil Vasquez is a 47 year old male who presented to the ED complaining of left flank pain for the last several days.  Denies any other new symptoms with this.  Vitals on arrival unremarkable.  Patient exhibited mild left flank tenderness on exam but otherwise no acute abnormalities.  He was offered IV fluids and pain medication here but he declined.  Declined lab studies as well, primary concern was to obtain imaging to see if he had a stone.  Urinalysis showed blood but no evidence of infection.  CT abdomen/pelvis revealed a 6 mm stone in the proximal left ureter with minimal hydroureter and hydronephrosis.  Otherwise unremarkable scan.  Results discussed  with the patient.  He expressed that he would like to follow-up in outpatient setting for this stone which I think is reasonable given no concurrent infection, pain tolerable, taking orals well.  He was given a prescription of Flomax along with Percocet and Zofran for symptomatic relief.  Encouraged lots of fluids.  He was given the contact information of urology to call on Monday for close follow-up.  He was advised to return to the ED for any worsening symptoms in the meantime.  All questions answered and patient discharged home in suitable condition.     I have reviewed the nursing notes.    I have reviewed the findings, diagnosis, plan and need for follow up with the patient.    New Prescriptions    ONDANSETRON (ZOFRAN ODT) 4 MG ODT TAB    Take 1 tablet (4 mg) by mouth every 8 hours as needed    OXYCODONE-ACETAMINOPHEN (PERCOCET) 5-325 MG PER TABLET    Take 1-2 tablets by mouth every 6 hours as needed for pain    TAMSULOSIN (FLOMAX) 0.4 MG CAPSULE    Take 1 capsule (0.4 mg) by mouth daily for 10 doses       Final diagnoses:   Left ureteral stone   Left flank pain     This document serves as a record of services personally performed by Jeni Hinton PA-C. It was created on their behalf by Ivy Sneed, a trained medical scribe. The creation of this record is based on the provider's personal observations and the statements of the patient. This document has been checked and approved by the attending provider.  Note: Chart documentation done in part with Dragon Voice Recognition software. Although reviewed after completion, some word and grammatical errors may remain.  8/25/2018   [facility] EMERGENCY DEPARTMENT     Jeni Hinton PA-C  08/25/18 1076

## 2018-08-25 NOTE — ED AVS SNAPSHOT
[facility] Emergency Department    [address]     [place] [address]    Phone:  [phone]    Fax:  [phone]                                       Cecil Vasquez   MRN: [id number]    Department:  [facility] Emergency Department   Date of Visit:  8/25/2018           Patient Information     Date Of Birth          1970        Your diagnoses for this visit were:     Left ureteral stone     Left flank pain        You were seen by Jeni Hinton PA-C.      Follow-up Information     Follow up with [facility] Emergency Department.    Specialty:  EMERGENCY MEDICINE    Why:  If symptoms worsen    Contact information:    [address]   [place] [address] [phone]    Additional information:    From [address]: Exit at [address] [employer] on south side of [place]. Turn right on [address]. Turn left at stoplight on [address]. [facility] will be in view two blocks ahead        Call Jorden Villafana MD.    Specialty:  Urology    Why:  For ER follow up    Contact information:    [facility] [address] [place] [address]  [facility] [address] [phone]          Discharge Instructions       Please use the percocet as needed for pain, and zofran for nausea. The flomax should help open up the ureters to aid in passing this stone. Be sure to drink lots of fluids. Follow up with urology as discussed. Return to the emergency department for any worsening symptoms.    Thank you for choosing [facility]'s Emergency Department. It was a pleasure taking care of you today. If you have any questions, please call [phone].    Jeni Hinton PA-C          Discharge References/Attachments     KIDNEY STONE W/ COLIC (ENGLISH)      24 Hour Appointment Hotline       To make an appointment at any [facility] clinic, call [phone] ([phone]). If you don't have a family doctor or clinic, we will help you find one. [facility]  clinics are conveniently located to serve the needs of you and your family.          ED Discharge Orders     UROLOGY ADULT REFERRAL       Your provider has referred you to: FMG: [facility] [phone]   [email or web address]  FH[facility]: [place] [facility]  [place] [phone]   [email or web address]    Please be aware that coverage of these services is subject to the terms and limitations of your health insurance plan.  Call member services at your health plan with any benefit or coverage questions.      Please bring the following with you to your appointment:    (1) Any X-Rays, CTs or MRIs which have been performed.  Contact the facility where they were done to arrange for  prior to your scheduled appointment.    (2) List of current medications  (3) This referral request   (4) Any documents/labs given to you for this referral                     Review of your medicines      START taking        Dose / Directions Last dose taken    ondansetron 4 MG ODT tab   Commonly known as:  ZOFRAN ODT   Dose:  4 mg   Quantity:  10 tablet        Take 1 tablet (4 mg) by mouth every 8 hours as needed   Refills:  0        oxyCODONE-acetaminophen 5-325 MG per tablet   Commonly known as:  PERCOCET   Dose:  1-2 tablet   Quantity:  12 tablet        Take 1-2 tablets by mouth every 6 hours as needed for pain   Refills:  0        tamsulosin 0.4 MG capsule   Commonly known as:  FLOMAX   Dose:  0.4 mg   Quantity:  10 capsule        Take 1 capsule (0.4 mg) by mouth daily for 10 doses   Refills:  0          Our records show that you are taking the medicines listed below. If these are incorrect, please call your family doctor or clinic.        Dose / Directions Last dose taken    amitriptyline 10 MG tablet   Commonly known as:  ELAVIL   Quantity:  90 tablet        Start at 1 tab at bedtime for 3 days, then 2 tabs at bedtime for 3 days, then 3 tabs at bedtime.  Plan to  increase dose to 50-75 mg at bedtime after 1 month   Refills:  0        amLODIPine 5 MG tablet   Commonly known as:  NORVASC   Dose:  5 mg   Quantity:  30 tablet        Take 1 tablet (5 mg) by mouth daily   Refills:  11        aspirin 81 MG EC tablet   Dose:  81 mg   Quantity:  90 tablet        Take 1 tablet (81 mg) by mouth daily   Refills:  0        atorvastatin 10 MG tablet   Commonly known as:  LIPITOR   Dose:  10 mg   Quantity:  90 tablet        Take 1 tablet (10 mg) by mouth daily   Refills:  3        ezetimibe 10 MG tablet   Commonly known as:  ZETIA   Dose:  10 mg   Quantity:  90 tablet        Take 1 tablet (10 mg) by mouth daily   Refills:  3        ipratropium - albuterol 0.5 mg/2.5 mg/3 mL 0.5-2.5 (3) MG/3ML neb solution   Commonly known as:  DUONEB   Dose:  1 vial   Quantity:  30 vial        Take 1 vial (3 mLs) by nebulization every 4 hours as needed for shortness of breath / dyspnea   Refills:  0        lisinopril 10 MG tablet   Commonly known as:  PRINIVIL/ZESTRIL   Dose:  20 mg   Quantity:  90 tablet        Take 2 tablets (20 mg) by mouth daily   Refills:  3        MAPAP 325 MG tablet   Quantity:  100 tablet   Generic drug:  acetaminophen        TAKE TWO TABLETS BY MOUTH EVERY 4 HOURS AS NEEDED FOR MILD PAIN   Refills:  11        melatonin 3 MG tablet   Quantity:  30 tablet        TAKE ONE TABLET BY MOUTH NIGHTLY AS NEEDED FOR SLEEP   Refills:  1        nitroGLYcerin 0.4 MG sublingual tablet   Commonly known as:  NITROSTAT   Quantity:  25 tablet        For chest pain place 1 tablet under the tongue every 5 minutes for 3 doses. If symptoms persist 5 minutes after 1st dose call 911.   Refills:  3        prasugrel 10 MG Tabs tablet   Commonly known as:  EFFIENT   Dose:  10 mg   Quantity:  90 tablet        Take 1 tablet (10 mg) by mouth daily   Refills:  3        sildenafil 100 MG tablet   Commonly known as:  VIAGRA   Dose:  50 mg   Quantity:  2 tablet        Take 0.5 tablets (50 mg) by mouth daily as  needed Take 30 min to 4 hours before intercourse.  Never use with nitroglycerin, terazosin or doxazosin.   Refills:  1        traMADol 50 MG tablet   Commonly known as:  ULTRAM   Dose:   mg   Quantity:  180 tablet        Take 1-2 tablets ( mg) by mouth 3 times daily as needed for moderate pain   Refills:  0        VENTOLIN  (90 Base) MCG/ACT inhaler   Quantity:  18 g   Generic drug:  albuterol        INHALE 2 PUFFS INTO THE LUNGS EVERY 4 HOURS AS NEEDED FOR SHORTNESS OF BREATH OR DIFFICULTY BREATHING   Refills:  5                Information about OPIOIDS     PRESCRIPTION OPIOIDS: WHAT YOU NEED TO KNOW   We gave you an opioid (narcotic) pain medicine. It is important to manage your pain, but opioids are not always the best choice. You should first try all the other options your care team gave you. Take this medicine for as short a time (and as few doses) as possible.    Some activities can increase your pain, such as bandage changes or therapy sessions. It may help to take your pain medicine 30 to 60 minutes before these activities. Reduce your stress by getting enough sleep, working on hobbies you enjoy and practicing relaxation or meditation. Talk to your care team about ways to manage your pain beyond prescription opioids.    These medicines have risks:    DO NOT drive when on new or higher doses of pain medicine. These medicines can affect your alertness and reaction times, and you could be arrested for driving under the influence (DUI). If you need to use opioids long-term, talk to your care team about driving.    DO NOT operate heavy machinery    DO NOT do any other dangerous activities while taking these medicines.    DO NOT drink any alcohol while taking these medicines.     If the opioid prescribed includes acetaminophen, DO NOT take with any other medicines that contain acetaminophen. Read all labels carefully. Look for the word  acetaminophen  or  Tylenol.  Ask your pharmacist if you  have questions or are unsure.    You can get addicted to pain medicines, especially if you have a history of addiction (chemical, alcohol or substance dependence). Talk to your care team about ways to reduce this risk.    All opioids tend to cause constipation. Drink plenty of water and eat foods that have a lot of fiber, such as fruits, vegetables, prune juice, apple juice and high-fiber cereal. Take a laxative (Miralax, milk of magnesia, Colace, Senna) if you don t move your bowels at least every other day. Other side effects include upset stomach, sleepiness, dizziness, throwing up, tolerance (needing more of the medicine to have the same effect), physical dependence and slowed breathing.    Store your pills in a secure place, locked if possible. We will not replace any lost or stolen medicine. If you don t finish your medicine, please throw away (dispose) as directed by your pharmacist. The [place] [employer] has more information about safe disposal: https://www.pca.[facility].[place].us/living-green/managing-unwanted-medications        Prescriptions were sent or printed at these locations (3 Prescriptions)                   [facility] [place], [place] - [address]    [address] , [facility] [address]    Telephone:  [phone]   Fax:  [phone]   Hours:                  E-Prescribed (2 of 3)         ondansetron (ZOFRAN ODT) 4 MG ODT tab               tamsulosin (FLOMAX) 0.4 MG capsule                 Printed at Department/Unit printer (1 of 3)         oxyCODONE-acetaminophen (PERCOCET) 5-325 MG per tablet                Procedures and tests performed during your visit     CT Abdomen Pelvis w/o Contrast    Routine UA with microscopic      Orders Needing Specimen Collection     None      Pending Results     Date and Time Order Name Status Description    8/25/2018 1308 CT Abdomen Pelvis w/o Contrast Preliminary             Pending Culture Results     No orders found from 8/23/2018  "to 2018.            Pending Results Instructions     If you had any lab results that were not finalized at the time of your Discharge, you can call the ED Lab Result RN at [phone]. You will be contacted by this team for any positive Lab results or changes in treatment. The nurses are available 7 days a week from 10A to 6:30P.  You can leave a message 24 hours per day and they will return your call.        Thank you for choosing [facility]       Thank you for choosing [facility] for your care. Our goal is always to provide you with excellent care. Hearing back from our patients is one way we can continue to improve our services. Please take a few minutes to complete the written survey that you may receive in the mail after you visit with us. Thank you!        [facility] Information     [employer] lets you send messages to your doctor, view your test results, renew your prescriptions, schedule appointments and more. To sign up, go to [email or web address] . Click on \"Log in\" on the left side of the screen, which will take you to the Welcome page. Then click on \"Sign up Now\" on the right side of the page.     You will be asked to enter the access code listed below, as well as some personal information. Please follow the directions to create your username and password.     Your access code is: [email or web address]  Expires: 10/21/2018  8:05 PM     Your access code will  in 90 days. If you need help or a new code, please call your [facility] clinic or [phone].        Care EveryWhere ID     This is your Care EveryWhere ID. This could be used by other organizations to access your [facility] medical records  [id number]        Equal Access to Services     [facility]: Hadii jeronimo Ponce, ramo bright, david key. So [facility] [phone].    ATENCIÓN: Si habla español, tiene a maynard disposición servicios gratuitos de asistencia lingüística. Llame al " [phone].    We comply with applicable federal civil rights laws and [place] laws. We do not discriminate on the basis of race, color, national origin, age, disability, sex, sexual orientation, or gender identity.            After Visit Summary       This is your record. Keep this with you and show to your community pharmacist(s) and doctor(s) at your next visit.

## 2018-08-25 NOTE — ED TRIAGE NOTES
Pt was to have an US for kidney stones yesterday but missed it.  He was told this wk that he has a 4cm stone.  He has a hx of kidney stones/lithotrypsy.

## 2018-08-25 NOTE — ED AVS SNAPSHOT
[facility] Emergency Department    [address] [place] [address]    Phone:  [phone]    Fax:  [phone]                                       Cecil Vasquez   MRN: [id number]    Department:  [facility] Emergency Department   Date of Visit:  8/25/2018           After Visit Summary Signature Page     I have received my discharge instructions, and my questions have been answered. I have discussed any challenges I see with this plan with the nurse or doctor.    ..........................................................................................................................................  Patient/Patient Representative Signature      ..........................................................................................................................................  Patient Representative Print Name and Relationship to Patient    ..................................................               ................................................  Date                                            Time    ..........................................................................................................................................  Reviewed by Signature/Title    ...................................................              ..............................................  Date                                                            Time          22EPIC Rev 08/18

## 2018-09-01 ENCOUNTER — HOSPITAL ENCOUNTER (EMERGENCY)
Facility: CLINIC | Age: 48
Discharge: HOME | End: 2018-09-01
Attending: PHYSICIAN ASSISTANT | Admitting: PHYSICIAN ASSISTANT
Payer: COMMERCIAL

## 2018-09-01 ENCOUNTER — NURSE TRIAGE (OUTPATIENT)
Dept: NURSING | Facility: CLINIC | Age: 48
End: 2018-09-01

## 2018-09-01 VITALS
DIASTOLIC BLOOD PRESSURE: 92 MMHG | TEMPERATURE: 97.6 F | BODY MASS INDEX: 33.82 KG/M2 | RESPIRATION RATE: 14 BRPM | SYSTOLIC BLOOD PRESSURE: 135 MMHG | HEART RATE: 62 BPM | WEIGHT: 229 LBS | OXYGEN SATURATION: 97 %

## 2018-09-01 DIAGNOSIS — R52 BODY ACHES: ICD-10-CM

## 2018-09-01 LAB
ALBUMIN UR-MCNC: ABNORMAL MG/DL
ANION GAP SERPL CALCULATED.3IONS-SCNC: 7 MMOL/L (ref 3–14)
APPEARANCE UR: CLEAR
BACTERIA #/AREA URNS HPF: ABNORMAL /HPF
BASOPHILS # BLD AUTO: 0 10E9/L (ref 0–0.2)
BASOPHILS NFR BLD AUTO: 0.3 %
BILIRUB UR QL STRIP: ABNORMAL
BUN SERPL-MCNC: 17 MG/DL (ref 7–30)
CALCIUM SERPL-MCNC: 9 MG/DL (ref 8.5–10.1)
CHLORIDE SERPL-SCNC: 103 MMOL/L (ref 94–109)
CK SERPL-CCNC: 220 U/L (ref 30–300)
CO2 SERPL-SCNC: 31 MMOL/L (ref 20–32)
COLOR UR AUTO: ABNORMAL
CREAT SERPL-MCNC: 0.84 MG/DL (ref 0.66–1.25)
DIFFERENTIAL METHOD BLD: ABNORMAL
EOSINOPHIL NFR BLD AUTO: 2.7 %
ERYTHROCYTE [DISTWIDTH] IN BLOOD BY AUTOMATED COUNT: 12.6 % (ref 10–15)
GFR SERPL CREATININE-BSD FRML MDRD: >90 ML/MIN/1.7M2
GLUCOSE SERPL-MCNC: 86 MG/DL (ref 70–99)
GLUCOSE UR STRIP-MCNC: ABNORMAL MG/DL
HCT VFR BLD AUTO: 37 % (ref 40–53)
HGB BLD-MCNC: 12.3 G/DL (ref 13.3–17.7)
HGB UR QL STRIP: ABNORMAL
IMM GRANULOCYTES # BLD: 0 10E9/L (ref 0–0.4)
IMM GRANULOCYTES NFR BLD: 0.3 %
KETONES UR STRIP-MCNC: ABNORMAL MG/DL
LACTATE BLD-SCNC: 0.7 MMOL/L (ref 0.7–2)
LEUKOCYTE ESTERASE UR QL STRIP: ABNORMAL
LYMPHOCYTES # BLD AUTO: 2 10E9/L (ref 0.8–5.3)
LYMPHOCYTES NFR BLD AUTO: 29.6 %
MAGNESIUM SERPL-MCNC: 2.1 MG/DL (ref 1.6–2.3)
MCH RBC QN AUTO: 29 PG (ref 26.5–33)
MCHC RBC AUTO-ENTMCNC: 33.2 G/DL (ref 31.5–36.5)
MCV RBC AUTO: 87 FL (ref 78–100)
MONOCYTES # BLD AUTO: 0.5 10E9/L (ref 0–1.3)
MONOCYTES NFR BLD AUTO: 7 %
MUCOUS THREADS #/AREA URNS LPF: PRESENT /LPF
NEUTROPHILS # BLD AUTO: 4 10E9/L (ref 1.6–8.3)
NEUTROPHILS NFR BLD AUTO: 60.1 %
NITRATE UR QL: ABNORMAL
NRBC # BLD AUTO: 0 10*3/UL
NRBC BLD AUTO-RTO: 0 /100
PH UR STRIP: ABNORMAL PH (ref 5–7)
PLATELET # BLD AUTO: 176 10E9/L (ref 150–450)
POTASSIUM SERPL-SCNC: 4 MMOL/L (ref 3.4–5.3)
RBC # BLD AUTO: 4.24 10E12/L (ref 4.4–5.9)
RBC #/AREA URNS AUTO: >182 /HPF (ref 0–2)
SODIUM SERPL-SCNC: 141 MMOL/L (ref 133–144)
SOURCE: ABNORMAL
SP GR UR STRIP: ABNORMAL (ref 1–1.03)
SQUAMOUS #/AREA URNS AUTO: <1 /HPF (ref 0–1)
TROPONIN I SERPL-MCNC: <0.015 UG/L (ref 0–0.04)
UROBILINOGEN UR STRIP-MCNC: ABNORMAL MG/DL (ref 0–2)
WBC # BLD AUTO: 6.6 10E9/L (ref 4–11)
WBC #/AREA URNS AUTO: 19 /HPF (ref 0–5)

## 2018-09-01 PROCEDURE — 93005 ELECTROCARDIOGRAM TRACING: CPT | Performed by: PHYSICIAN ASSISTANT

## 2018-09-01 PROCEDURE — 96361 HYDRATE IV INFUSION ADD-ON: CPT | Performed by: PHYSICIAN ASSISTANT

## 2018-09-01 PROCEDURE — 25000128 H RX IP 250 OP 636: Performed by: PHYSICIAN ASSISTANT

## 2018-09-01 PROCEDURE — 93010 ELECTROCARDIOGRAM REPORT: CPT | Mod: Z6 | Performed by: PHYSICIAN ASSISTANT

## 2018-09-01 PROCEDURE — 85025 COMPLETE CBC W/AUTO DIFF WBC: CPT | Performed by: PHYSICIAN ASSISTANT

## 2018-09-01 PROCEDURE — 83735 ASSAY OF MAGNESIUM: CPT | Performed by: PHYSICIAN ASSISTANT

## 2018-09-01 PROCEDURE — 81001 URINALYSIS AUTO W/SCOPE: CPT | Performed by: PHYSICIAN ASSISTANT

## 2018-09-01 PROCEDURE — 84484 ASSAY OF TROPONIN QUANT: CPT | Performed by: PHYSICIAN ASSISTANT

## 2018-09-01 PROCEDURE — 87086 URINE CULTURE/COLONY COUNT: CPT | Performed by: PHYSICIAN ASSISTANT

## 2018-09-01 PROCEDURE — 96374 THER/PROPH/DIAG INJ IV PUSH: CPT | Performed by: PHYSICIAN ASSISTANT

## 2018-09-01 PROCEDURE — 25000132 ZZH RX MED GY IP 250 OP 250 PS 637: Performed by: PHYSICIAN ASSISTANT

## 2018-09-01 PROCEDURE — 99284 EMERGENCY DEPT VISIT MOD MDM: CPT | Mod: 25 | Performed by: PHYSICIAN ASSISTANT

## 2018-09-01 PROCEDURE — 80048 BASIC METABOLIC PNL TOTAL CA: CPT | Performed by: PHYSICIAN ASSISTANT

## 2018-09-01 PROCEDURE — 82550 ASSAY OF CK (CPK): CPT | Performed by: PHYSICIAN ASSISTANT

## 2018-09-01 PROCEDURE — 83605 ASSAY OF LACTIC ACID: CPT | Performed by: PHYSICIAN ASSISTANT

## 2018-09-01 RX ORDER — KETOROLAC TROMETHAMINE 30 MG/ML
30 INJECTION, SOLUTION INTRAMUSCULAR; INTRAVENOUS ONCE
Status: COMPLETED | OUTPATIENT
Start: 2018-09-01 | End: 2018-09-01

## 2018-09-01 RX ORDER — OXYCODONE HYDROCHLORIDE 5 MG/1
5 TABLET ORAL ONCE
Status: COMPLETED | OUTPATIENT
Start: 2018-09-01 | End: 2018-09-01

## 2018-09-01 RX ADMIN — OXYCODONE HYDROCHLORIDE 5 MG: 5 TABLET ORAL at 16:53

## 2018-09-01 RX ADMIN — SODIUM CHLORIDE 1000 ML: 9 INJECTION, SOLUTION INTRAVENOUS at 16:55

## 2018-09-01 RX ADMIN — KETOROLAC TROMETHAMINE 30 MG: 30 INJECTION, SOLUTION INTRAMUSCULAR at 17:26

## 2018-09-01 NOTE — TELEPHONE ENCOUNTER
Aaron says patient is had kidney stones removed and now have sever flank pain.  Reviewed guideline and care advice with caller.  FNA advised to call back with questions or worsening symptoms.  Caller verbalizes understanding.      Reason for Disposition    Sounds like a serious complication to the triager    Additional Information    Negative: Sounds like a life-threatening emergency to the triager    Negative: Chest pain    Negative: Difficulty breathing    Negative: Surgical incision symptoms and questions    Negative: [1] Discomfort (pain, burning or stinging) when passing urine AND [2] male    Negative: [1] Discomfort (pain, burning or stinging) when passing urine AND [2] female    Negative: Constipation    Negative: Calf pain    Negative: Dizziness is severe, or persists > 24 hours after surgery    Negative: Pain, redness, swelling, or pus at IV Site    Negative: Symptoms arising from use of a urinary catheter (Alfred or Coude)    Negative: Cast problems or questions    Negative: Medication question    Negative: [1] Widespread rash AND [2] bright red, sunburn-like    Negative: [1] SEVERE headache AND [2] after spinal (epidural) anesthesia    Negative: [1] Vomiting AND [2] persists > 4 hours    Negative: [1] Drinking very little AND [2] dehydration suspected (e.g., no urine > 12 hours, very dry mouth, very lightheaded)    Negative: Patient sounds very sick or weak to the triager    Negative: [1] Vomiting AND [2] abdomen looks much more swollen than usual    Protocols used: POST-OP SYMPTOMS AND QUESTIONS-[facility]

## 2018-09-01 NOTE — ED AVS SNAPSHOT
[facility] Emergency Department    [address] [place] [address]    Phone:  [phone]    Fax:  [phone]                                       Cecil Vasquez   MRN: [id number]    Department:  [facility] Emergency Department   Date of Visit:  9/1/2018           After Visit Summary Signature Page     I have received my discharge instructions, and my questions have been answered. I have discussed any challenges I see with this plan with the nurse or doctor.    ..........................................................................................................................................  Patient/Patient Representative Signature      ..........................................................................................................................................  Patient Representative Print Name and Relationship to Patient    ..................................................               ................................................  Date                                            Time    ..........................................................................................................................................  Reviewed by Signature/Title    ...................................................              ..............................................  Date                                                            Time          22EPIC Rev 08/18

## 2018-09-01 NOTE — ED NOTES
Patient reports that his pain hasn't gotten worse, but it has not improved. Provider notified. Shira Rg RN

## 2018-09-01 NOTE — ED PROVIDER NOTES
"  History     Chief Complaint   Patient presents with     Musculoskeletal Problem     HPI  Cecil Vasquez is a 47 year old male who presents to the emergency department complaining of his arms and legs hurting. The patient reports yesterday he had a lithotripsy completed for a kidney stone.  He has had this procedure done before.  However since then over the last 24 hours he has developed \"soreness\" in all of his muscles.  He states his calves, arms, neck, chest wall muscles are all very sore and tight and achy.  Because of the soreness he feels a little short of breath because it hurts in his chest wall the take a deep breath.  He denies any chest pain or heaviness otherwise.  He reports continued flank pain and dysuria.  He states he is urinating a lot of blood currently which he says is usual after these urologic procedures.  He states it feels like \"body aches\" but he has not had a fever.  He reports nausea and vomiting that is unchanged from his chronic nausea and vomiting.  No cough.  He has not taken anything for symptoms.  He denies any numbness or weakness in extremities.  They called the nurse line who advised he come here.    Problem List:    Patient Active Problem List    Diagnosis Date Noted     Chronic pain syndrome 03/23/2017     Priority: Medium     Broken contract : marijuana and fill through outside provider 11/10/2017          CAD-  PHYLLIS to prox LAD for 99% stenosis, Remaining 60% distal rca 02/28/2017     Priority: Medium     ACS (acute coronary syndrome) (H) 02/21/2017     Priority: Medium     Neuropathy 12/21/2016     Priority: Medium     Essential hypertension with goal blood pressure less than 140/90 06/24/2016     Priority: Medium     Nephrolithiasis 03/22/2016     Priority: Medium     Rosacea 11/19/2015     Priority: Medium     Obesity 06/29/2015     Priority: Medium     Depression, major, recurrent, in remission (H) 01/29/2015     Priority: Medium     Attention deficit hyperactivity " disorder (ADHD) 01/13/2015     Priority: Medium     Problem list name updated by automated process. Provider to review       Impaired fasting glucose 09/09/2011     Priority: Medium     Fasting . Recheck BG and A1c in 3 months.       Bipolar disorder (H) 08/18/2011     Priority: Medium     Diagnosed at age 15.   Prozac and lithium didn't help.  Depakote for 10 years which worked well.  Problem list name updated by automated process. Provider to review       Intermittent asthma 08/18/2011     Priority: Medium        Past Medical History:    Past Medical History:   Diagnosis Date     ADHD (attention deficit hyperactivity disorder)      Bipolar 1 disorder (H)      Depression      Gastro-oesophageal reflux disease      Hyperlipidaemia      Hypertension      Impaired fasting glucose      Nephrolithiasis      Obesity      Reactive airway disease      Renal calcification      Rosacea      Tobacco abuse      Trigeminal neuralgia      Uncomplicated asthma        Past Surgical History:    Past Surgical History:   Procedure Laterality Date     COMBINED CYSTOSCOPY, INSERT CATHETER URETER Left 3/25/2016    Procedure: COMBINED CYSTOSCOPY, INSERT CATHETER URETER;  Surgeon: Jorden Villafana MD;  Location:  OR     COMBINED CYSTOSCOPY, RETROGRADES, URETEROSCOPY, LASER HOLMIUM LITHOTRIPSY URETER(S), INSERT STENT Left 4/13/2016    Procedure: COMBINED CYSTOSCOPY, RETROGRADES, URETEROSCOPY, LASER HOLMIUM LITHOTRIPSY URETER(S), INSERT STENT;  Surgeon: Jorden Villafana MD;  Location:  OR     EXTRACORPOREAL SHOCK WAVE LITHOTRIPSY (ESWL) Left 3/25/2016    Procedure: EXTRACORPOREAL SHOCK WAVE LITHOTRIPSY (ESWL);  Surgeon: Jorden Villafana MD;  Location:  OR     EXTRACORPOREAL SHOCK WAVE LITHOTRIPSY, CYSTOSCOPY, INSERT STENT URETER(S), COMBINED Left 3/25/2016    Procedure: COMBINED EXTRACORPOREAL SHOCK WAVE LITHOTRIPSY, CYSTOSCOPY, INSERT STENT URETER(S);  Surgeon: Jorden Villafana MD;  Location:  OR      GENITOURINARY SURGERY  3 25 2016     Lithotripsy and stent placement     TONSILLECTOMY         Family History:    Family History   Problem Relation Age of Onset     Psychotic Disorder Mother      Diabetes Sister      Psychotic Disorder Sister        Social History:  Marital Status:   [4]  Social History   Substance Use Topics     Smoking status: Former Smoker     Packs/day: 0.50     Types: Cigarettes     Smokeless tobacco: Never Used     Alcohol use No        Medications:      oxyCODONE-acetaminophen (PERCOCET) 5-325 MG per tablet   amitriptyline (ELAVIL) 10 MG tablet   amLODIPine (NORVASC) 5 MG tablet   aspirin 81 MG EC tablet   atorvastatin (LIPITOR) 10 MG tablet   ezetimibe (ZETIA) 10 MG tablet   ipratropium - albuterol 0.5 mg/2.5 mg/3 mL (DUONEB) 0.5-2.5 (3) MG/3ML neb solution   lisinopril (PRINIVIL/ZESTRIL) 10 MG tablet   MAPAP 325 MG tablet   melatonin 3 MG tablet   nitroGLYcerin (NITROSTAT) 0.4 MG sublingual tablet   prasugrel (EFFIENT) 10 MG TABS tablet   sildenafil (VIAGRA) 100 MG tablet   tamsulosin (FLOMAX) 0.4 MG capsule   traMADol (ULTRAM) 50 MG tablet   VENTOLIN  (90 BASE) MCG/ACT Inhaler         Review of Systems   All other systems reviewed and are negative.      Physical Exam   BP: (!) 141/108  Pulse: 55  Heart Rate: 55  Temp: 97.6  F (36.4  C)  Resp: 18  Weight: 103.9 kg (229 lb)  SpO2: 99 %      Physical Exam   Constitutional: He is oriented to person, place, and time. He appears well-developed and well-nourished. No distress.   HENT:   Head: Normocephalic and atraumatic.   Eyes: Conjunctivae are normal.   Neck: Neck supple.   Cardiovascular: Normal rate, regular rhythm and normal heart sounds.    Pulmonary/Chest: Effort normal and breath sounds normal. No respiratory distress. He has no wheezes. He has no rales.   Abdominal: Soft. He exhibits no distension. There is no tenderness. There is no rebound and no guarding.   Bilateral flank tenderness   Musculoskeletal:   No significant  muscle spasming, tightness, or tenderness noted to neck, arms, or lower legs.  He has no swelling or redness in lower extremities.  Normal strength in all extremities noted.   Neurological: He is alert and oriented to person, place, and time.   Skin: Skin is warm and dry. He is not diaphoretic.   Psychiatric: He has a normal mood and affect.   Nursing note and vitals reviewed.      ED Course     ED Course     Procedures         EKG Interpretation:      Interpreted by Jeni Hinton  Time reviewed: 1736  Symptoms at time of EKG: chest tightness   Rhythm: sinus bradycardia  Rate: 40-50  Axis: Normal  Ectopy: none  Conduction: normal  ST Segments/ T Waves: No acute ischemic changes  Q Waves: none  Comparison to prior: no significant changes    Clinical Impression: no acute changes, sinus bradycardia      Results for orders placed or performed during the hospital encounter of 09/01/18 (from the past 24 hour(s))   CBC with platelets differential   Result Value Ref Range    WBC 6.6 4.0 - 11.0 10e9/L    RBC Count 4.24 (L) 4.4 - 5.9 10e12/L    Hemoglobin 12.3 (L) 13.3 - 17.7 g/dL    Hematocrit 37.0 (L) 40.0 - 53.0 %    MCV 87 78 - 100 fl    MCH 29.0 26.5 - 33.0 pg    MCHC 33.2 31.5 - 36.5 g/dL    RDW 12.6 10.0 - 15.0 %    Platelet Count 176 150 - 450 10e9/L    Diff Method Automated Method     % Neutrophils 60.1 %    % Lymphocytes 29.6 %    % Monocytes 7.0 %    % Eosinophils 2.7 %    % Basophils 0.3 %    % Immature Granulocytes 0.3 %    Nucleated RBCs 0 0 /100    Absolute Neutrophil 4.0 1.6 - 8.3 10e9/L    Absolute Lymphocytes 2.0 0.8 - 5.3 10e9/L    Absolute Monocytes 0.5 0.0 - 1.3 10e9/L    Absolute Basophils 0.0 0.0 - 0.2 10e9/L    Abs Immature Granulocytes 0.0 0 - 0.4 10e9/L    Absolute Nucleated RBC 0.0    Basic metabolic panel   Result Value Ref Range    Sodium 141 133 - 144 mmol/L    Potassium 4.0 3.4 - 5.3 mmol/L    Chloride 103 94 - 109 mmol/L    Carbon Dioxide 31 20 - 32 mmol/L    Anion Gap 7 3 - 14 mmol/L     Glucose 86 70 - 99 mg/dL    Urea Nitrogen 17 7 - 30 mg/dL    Creatinine 0.84 0.66 - 1.25 mg/dL    GFR Estimate >90 >60 mL/min/1.7m2    GFR Estimate If Black >90 >60 mL/min/1.7m2    Calcium 9.0 8.5 - 10.1 mg/dL   CK total   Result Value Ref Range    CK Total 220 30 - 300 U/L   Magnesium   Result Value Ref Range    Magnesium 2.1 1.6 - 2.3 mg/dL   Lactic acid whole blood   Result Value Ref Range    Lactic Acid 0.7 0.7 - 2.0 mmol/L   Troponin I   Result Value Ref Range    Troponin I ES <0.015 0.000 - 0.045 ug/L   UA with Microscopic   Result Value Ref Range    Color Urine Orange     Appearance Urine Clear     Glucose Urine Interfering substances, dipstick not done (A) NEG^Negative mg/dL    Bilirubin Urine Interfering substances, dipstick not done (A) NEG^Negative    Ketones Urine Interfering substances, dipstick not done (A) NEG^Negative mg/dL    Specific Gravity Urine Interfering substances, dipstick not done 1.003 - 1.035    Blood Urine Interfering substances, dipstick not done (A) NEG^Negative    pH Urine Interfering substances, dipstick not done 5.0 - 7.0 pH    Protein Albumin Urine Interfering substances, dipstick not done (A) NEG^Negative mg/dL    Urobilinogen mg/dL Interfering substances, dipstick not done 0.0 - 2.0 mg/dL    Nitrite Urine Interfering substances, dipstick not done (A) NEG^Negative    Leukocyte Esterase Urine Interfering substances, dipstick not done (A) NEG^Negative    Source Unspecified Urine     WBC Urine 19 (H) 0 - 5 /HPF    RBC Urine >182 (H) 0 - 2 /HPF    Bacteria Urine Few (A) NEG^Negative /HPF    Squamous Epithelial /HPF Urine <1 0 - 1 /HPF    Mucous Urine Present (A) NEG^Negative /LPF       Medications   0.9% sodium chloride BOLUS (0 mLs Intravenous Stopped 9/1/18 1801)   oxyCODONE IR (ROXICODONE) tablet 5 mg (5 mg Oral Given 9/1/18 1653)   ketorolac (TORADOL) injection 30 mg (30 mg Intravenous Given 9/1/18 1726)       Assessments & Plan (with Medical Decision Making)  Cecil Vasquez  is a 47 year old male who presented to the ED complaining of diffuse muscle soreness and tightness that began after a lithotripsy procedure.  Denies fever.  Reports some shortness of breath due to the soreness in his chest.  On arrival to the ED blood pressure elevated at 141/108.  Mildly bradycardic.  O2 saturations 99% on room air.  Patient had bilateral flank tenderness which is somewhat expected after urologic procedure, but otherwise no muscular tenderness to areas he described soreness was located.  IV access was obtained and patient was given fluids, Toradol for symptoms, and oxycodone.  His CK was within normal limits today, so I do not suspect rhabdomyolysis as cause of muscle pain.  Potassium, calcium, magnesium normal as well.  BUN/creatinine within normal limits.  White count also normal, no signs of infection.  Urine with large amount of blood, patient was taking Pyridium so limited UA, culture pending.  Due to complaint of chest tightness and shortness of breath, EKG obtained which showed sinus bradycardia and no acute ischemic findings, and initial troponin was undetectable. Case discussed with Dr. Uribe.  Overall we are not finding anything concerning to explain his symptoms.  May be secondary to recent procedure and associated soreness from lying on table.  May be early symptoms of systemic illness but it is too soon to tell.  Discussed results with patient and he was reassured.  He felt comfortable monitoring things at home and using Tylenol or ibuprofen to manage discomfort.  He was encouraged to drink plenty of fluids as well.  If symptoms persist into next week he is advised to follow-up with his PCP.  For any worsening concerns he can return to the ED.  Patient in agreement with plan was discharged home in suitable condition.     I have reviewed the nursing notes.    I have reviewed the findings, diagnosis, plan and need for follow up with the patient.    Discharge Medication List as of  9/1/2018  6:27 PM          Final diagnoses:   Body aches     Note: Chart documentation done in part with Dragon Voice Recognition software. Although reviewed after completion, some word and grammatical errors may remain.      9/1/2018   [facility] EMERGENCY DEPARTMENT     Jeni Hinton PA-C  09/01/18 1853

## 2018-09-01 NOTE — ED AVS SNAPSHOT
[facility] Emergency Department    [address]     [place] [address]    Phone:  [phone]    Fax:  [phone]                                       Cecil Vasquez   MRN: [id number]    Department:  [facility] Emergency Department   Date of Visit:  9/1/2018           Patient Information     Date Of Birth          1970        Your diagnoses for this visit were:     Body aches        You were seen by Jeni Hinton PA-C.      Follow-up Information     Follow up with [facility] Emergency Department.    Specialty:  EMERGENCY MEDICINE    Why:  If symptoms worsen    Contact information:    [address]   [place] [address] [phone]    Additional information:    From [address]: Exit at [address] on south side of [place]. Turn right on [address]. Turn left at stoplight on [address]. [facility] will be in view two blocks ahead        Follow up with Sunil Khan MD In 3 days.    Why:  As needed for persistent symptoms    Contact information:    [facility]  [address] [address] [phone]          Discharge Instructions       Your workup today was very reassuring.  The cause of the body aches you are experiencing is unclear but it does not appear life-threatening.  I encourage you to use ibuprofen or Tylenol as needed for symptom control.  Be sure to drink lots of fluids and rest over the next couple days.  If things persist into next week, follow-up with your primary care provider.  If you develop any worsening symptoms do not hesitate to return to the emergency department.    Thank you for choosing [facility]'s Emergency Department. It was a pleasure taking care of you today. If you have any questions, please call [phone].    Jeni Hinton PA-C      24 Hour Appointment Hotline       To make an appointment at any [facility], call [phone] ([phone]). If you don't  have a family doctor or clinic, we will help you find one. [place] clinics are conveniently located to serve the needs of you and your family.             Review of your medicines      Our records show that you are taking the medicines listed below. If these are incorrect, please call your family doctor or clinic.        Dose / Directions Last dose taken    amitriptyline 10 MG tablet   Commonly known as:  ELAVIL   Quantity:  90 tablet        Start at 1 tab at bedtime for 3 days, then 2 tabs at bedtime for 3 days, then 3 tabs at bedtime.  Plan to increase dose to 50-75 mg at bedtime after 1 month   Refills:  0        amLODIPine 5 MG tablet   Commonly known as:  NORVASC   Dose:  5 mg   Quantity:  30 tablet        Take 1 tablet (5 mg) by mouth daily   Refills:  11        aspirin 81 MG EC tablet   Dose:  81 mg   Quantity:  90 tablet        Take 1 tablet (81 mg) by mouth daily   Refills:  0        atorvastatin 10 MG tablet   Commonly known as:  LIPITOR   Dose:  10 mg   Quantity:  90 tablet        Take 1 tablet (10 mg) by mouth daily   Refills:  3        ezetimibe 10 MG tablet   Commonly known as:  ZETIA   Dose:  10 mg   Quantity:  90 tablet        Take 1 tablet (10 mg) by mouth daily   Refills:  3        ipratropium - albuterol 0.5 mg/2.5 mg/3 mL 0.5-2.5 (3) MG/3ML neb solution   Commonly known as:  DUONEB   Dose:  1 vial   Quantity:  30 vial        Take 1 vial (3 mLs) by nebulization every 4 hours as needed for shortness of breath / dyspnea   Refills:  0        lisinopril 10 MG tablet   Commonly known as:  PRINIVIL/ZESTRIL   Dose:  20 mg   Quantity:  90 tablet        Take 2 tablets (20 mg) by mouth daily   Refills:  3        MAPAP 325 MG tablet   Quantity:  100 tablet   Generic drug:  acetaminophen        TAKE TWO TABLETS BY MOUTH EVERY 4 HOURS AS NEEDED FOR MILD PAIN   Refills:  11        melatonin 3 MG tablet   Quantity:  30 tablet        TAKE ONE TABLET BY MOUTH NIGHTLY AS NEEDED FOR SLEEP   Refills:  1         nitroGLYcerin 0.4 MG sublingual tablet   Commonly known as:  NITROSTAT   Quantity:  25 tablet        For chest pain place 1 tablet under the tongue every 5 minutes for 3 doses. If symptoms persist 5 minutes after 1st dose call 911.   Refills:  3        oxyCODONE-acetaminophen 5-325 MG per tablet   Commonly known as:  PERCOCET   Dose:  1-2 tablet   Quantity:  12 tablet        Take 1-2 tablets by mouth every 6 hours as needed for pain   Refills:  0        prasugrel 10 MG Tabs tablet   Commonly known as:  EFFIENT   Dose:  10 mg   Quantity:  90 tablet        Take 1 tablet (10 mg) by mouth daily   Refills:  3        sildenafil 100 MG tablet   Commonly known as:  VIAGRA   Dose:  50 mg   Quantity:  2 tablet        Take 0.5 tablets (50 mg) by mouth daily as needed Take 30 min to 4 hours before intercourse.  Never use with nitroglycerin, terazosin or doxazosin.   Refills:  1        tamsulosin 0.4 MG capsule   Commonly known as:  FLOMAX   Dose:  0.4 mg   Quantity:  10 capsule        Take 1 capsule (0.4 mg) by mouth daily for 10 doses   Refills:  0        traMADol 50 MG tablet   Commonly known as:  ULTRAM   Dose:   mg   Quantity:  180 tablet        Take 1-2 tablets ( mg) by mouth 3 times daily as needed for moderate pain   Refills:  0        VENTOLIN  (90 Base) MCG/ACT inhaler   Quantity:  18 g   Generic drug:  albuterol        INHALE 2 PUFFS INTO THE LUNGS EVERY 4 HOURS AS NEEDED FOR SHORTNESS OF BREATH OR DIFFICULTY BREATHING   Refills:  5                Procedures and tests performed during your visit     Basic metabolic panel    CBC with platelets differential    CK total    EKG 12-lead, tracing only    Lactic acid whole blood    Magnesium    Peripheral IV catheter    Troponin I    UA with Microscopic      Orders Needing Specimen Collection     None      Pending Results     No orders found from 8/30/2018 to 9/2/2018.            Pending Culture Results     No orders found from 8/30/2018 to 9/2/2018.        "     Pending Results Instructions     If you had any lab results that were not finalized at the time of your Discharge, you can call the ED Lab Result RN at [phone]. You will be contacted by this team for any positive Lab results or changes in treatment. The nurses are available 7 days a week from 10A to 6:30P.  You can leave a message 24 hours per day and they will return your call.        Thank you for choosing [facility]       Thank you for choosing [facility] for your care. Our goal is always to provide you with excellent care. Hearing back from our patients is one way we can continue to improve our services. Please take a few minutes to complete the written survey that you may receive in the mail after you visit with us. Thank you!        [employer] Information     [employer] lets you send messages to your doctor, view your test results, renew your prescriptions, schedule appointments and more. To sign up, go to [email or web address] . Click on \"Log in\" on the left side of the screen, which will take you to the Welcome page. Then click on \"Sign up Now\" on the right side of the page.     You will be asked to enter the access code listed below, as well as some personal information. Please follow the directions to create your username and password.     Your access code is: [email or web address]  Expires: 10/21/2018  8:05 PM     Your access code will  in 90 days. If you need help or a new code, please call your [facility] clinic or [phone].        Care EveryWhere ID     This is your Care EveryWhere ID. This could be used by other organizations to access your [facility] medical records  [id number]        Equal Access to Services     [facility]: Hadii jeronimo Ponce, wakikoda luqadaha, qaleodan kaaldavid faustin. So [facility] [phone].    ATENCIÓN: Si habla español, tiene a maynard disposición servicios gratuitos de asistencia lingüística. Llame al [phone].    We comply " with applicable federal civil rights laws and [place] laws. We do not discriminate on the basis of race, color, national origin, age, disability, sex, sexual orientation, or gender identity.            After Visit Summary       This is your record. Keep this with you and show to your community pharmacist(s) and doctor(s) at your next visit.

## 2018-09-01 NOTE — DISCHARGE INSTRUCTIONS
Your workup today was very reassuring.  The cause of the body aches you are experiencing is unclear but it does not appear life-threatening.  I encourage you to use ibuprofen or Tylenol as needed for symptom control.  Be sure to drink lots of fluids and rest over the next couple days.  If things persist into next week, follow-up with your primary care provider.  If you develop any worsening symptoms do not hesitate to return to the emergency department.    Thank you for choosing [facility] Emergency Department. It was a pleasure taking care of you today. If you have any questions, please call [phone].    Jeni Hinton PA-C

## 2018-09-02 LAB
BACTERIA SPEC CULT: NO GROWTH
Lab: NORMAL
SPECIMEN SOURCE: NORMAL

## 2018-09-05 ENCOUNTER — HOSPITAL ENCOUNTER (OUTPATIENT)
Dept: GENERAL RADIOLOGY | Facility: CLINIC | Age: 48
Discharge: HOME | End: 2018-09-05
Attending: UROLOGY | Admitting: UROLOGY
Payer: COMMERCIAL

## 2018-09-05 DIAGNOSIS — N20.0 KIDNEY STONE: ICD-10-CM

## 2018-09-05 PROCEDURE — 74019 RADEX ABDOMEN 2 VIEWS: CPT | Mod: TC

## 2018-09-20 ENCOUNTER — TRANSFERRED RECORDS (OUTPATIENT)
Dept: HEALTH INFORMATION MANAGEMENT | Facility: CLINIC | Age: 48
End: 2018-09-20

## 2018-09-26 ENCOUNTER — TELEPHONE (OUTPATIENT)
Dept: LAB | Facility: OTHER | Age: 48
End: 2018-09-26

## 2018-09-26 NOTE — TELEPHONE ENCOUNTER
Panel Management Review      Patient has the following on his problem list:     Depression / Dysthymia review    Measure:  Needs PHQ-9 score of 4 or less during index window.  Administer PHQ-9 and if score is 5 or more, send encounter to provider for next steps.    5 - 7 month window range: 1    PHQ-9 SCORE 11/17/2016 12/16/2016 7/14/2017   Total Score - - -   Total Score 1 5 0       If PHQ-9 recheck is 5 or more, route to provider for next steps.    Patient is due for:  PHQ9 and DAP    Asthma review     ACT Total Scores 7/14/2017   ACT TOTAL SCORE -   ASTHMA ER VISITS -   ASTHMA HOSPITALIZATIONS -   ACT TOTAL SCORE (Goal Greater than or Equal to 20) 23   In the past 12 months, how many times did you visit the emergency room for your asthma without being admitted to the hospital? 0   In the past 12 months, how many times were you hospitalized overnight because of your asthma? 0      1. Is Asthma diagnosis on the Problem List? Yes    2. Is Asthma listed on Health Maintenance? Yes    3. Patient is due for:  ACT and AAP    Hypertension   Last three blood pressure readings:  BP Readings from Last 3 Encounters:   09/01/18 (!) 135/92   08/25/18 126/72   07/23/18 120/84     Blood pressure: FAILED    HTN Guidelines:  Age 18-59 BP range:  Less than 140/90  Age 60-85 with Diabetes:  Less than 140/90  Age 60-85 without Diabetes:  less than 150/90       ADHD (ages 6-12)  Review Medication Prescription dates:              Is this the first RX date?   Patient not on meds  Review Quality Dashboard or scorecard for index dates for patient.         Composite cancer screening  Chart review shows that this patient is due/due soon for the following None  Summary:    Patient is due/failing the following:   AAP, ACT, BP CHECK and PHQ9    Action needed:   Patient needs office visit for Asthma, HTN, Depression.    Type of outreach:    Phone, left message for patient to call back.     Questions for provider review:    None                                                                                                                                     Maria De Jesus Price MA 9/26/2018       Chart routed to NA .

## 2019-07-10 NOTE — DISCHARGE INSTRUCTIONS
Please use the percocet as needed for pain, and zofran for nausea. The flomax should help open up the ureters to aid in passing this stone. Be sure to drink lots of fluids. Follow up with urology as discussed. Return to the emergency department for any worsening symptoms.    Thank you for choosing [facility]'s Emergency Department. It was a pleasure taking care of you today. If you have any questions, please call [phone].    Jeni Hinton PA-C         EKG completed

## 2019-08-22 NOTE — TELEPHONE ENCOUNTER
Routing refill request to provider for review/approval because:  Cardiology provider DC's Tylenol at LOV of 3/23/17. ( see below)   Medications Discontinued During This Encounter   Medication Reason     acetaminophen (TYLENOL) 325 MG tablet            Metoprolol XL 25 mg RX was denied as this was last filled by cardiology on 3/23/17 forMetoprolol 50 mg XL#90 with 3 RF's.............RUSS Li\           Mohs Case Number:

## 2019-10-15 ENCOUNTER — HOSPITAL ENCOUNTER (INPATIENT)
Facility: CLINIC | Age: 49
LOS: 5 days | Discharge: HOME | End: 2019-10-21
Attending: PSYCHIATRY & NEUROLOGY | Admitting: PSYCHIATRY & NEUROLOGY
Payer: COMMERCIAL

## 2019-10-15 DIAGNOSIS — F51.04 PSYCHOPHYSIOLOGICAL INSOMNIA: ICD-10-CM

## 2019-10-15 DIAGNOSIS — J45.20 MILD INTERMITTENT ASTHMA WITHOUT COMPLICATION: ICD-10-CM

## 2019-10-15 DIAGNOSIS — E56.9 VITAMIN DEFICIENCY: ICD-10-CM

## 2019-10-15 DIAGNOSIS — F32.2 CURRENT SEVERE EPISODE OF MAJOR DEPRESSIVE DISORDER WITHOUT PSYCHOTIC FEATURES, UNSPECIFIED WHETHER RECURRENT (H): ICD-10-CM

## 2019-10-15 DIAGNOSIS — F31.31 BIPOLAR AFFECTIVE DISORDER, CURRENTLY DEPRESSED, MILD (H): ICD-10-CM

## 2019-10-15 DIAGNOSIS — F41.9 ANXIETY: Primary | ICD-10-CM

## 2019-10-15 DIAGNOSIS — I10 ESSENTIAL HYPERTENSION WITH GOAL BLOOD PRESSURE LESS THAN 140/90: ICD-10-CM

## 2019-10-15 LAB
ALBUMIN SERPL-MCNC: 3 G/DL (ref 3.4–5)
ALP SERPL-CCNC: 84 U/L (ref 40–150)
ALT SERPL W P-5'-P-CCNC: 29 U/L (ref 0–70)
AMPHETAMINES UR QL SCN: POSITIVE
ANION GAP SERPL CALCULATED.3IONS-SCNC: 6 MMOL/L (ref 3–14)
AST SERPL W P-5'-P-CCNC: 25 U/L (ref 0–45)
BARBITURATES UR QL: NEGATIVE
BASOPHILS # BLD AUTO: 0 10E9/L (ref 0–0.2)
BASOPHILS NFR BLD AUTO: 0.4 %
BENZODIAZ UR QL: NEGATIVE
BILIRUB SERPL-MCNC: 0.2 MG/DL (ref 0.2–1.3)
BUN SERPL-MCNC: 23 MG/DL (ref 7–30)
CALCIUM SERPL-MCNC: 8.5 MG/DL (ref 8.5–10.1)
CANNABINOIDS UR QL SCN: POSITIVE
CHLORIDE SERPL-SCNC: 106 MMOL/L (ref 94–109)
CO2 SERPL-SCNC: 27 MMOL/L (ref 20–32)
COCAINE UR QL: NEGATIVE
CREAT SERPL-MCNC: 0.96 MG/DL (ref 0.66–1.25)
DIFFERENTIAL METHOD BLD: ABNORMAL
EOSINOPHIL # BLD AUTO: 0.3 10E9/L (ref 0–0.7)
EOSINOPHIL NFR BLD AUTO: 4.2 %
ERYTHROCYTE [DISTWIDTH] IN BLOOD BY AUTOMATED COUNT: 12.4 % (ref 10–15)
ETHANOL UR QL SCN: NEGATIVE
GFR SERPL CREATININE-BSD FRML MDRD: >90 ML/MIN/{1.73_M2}
GLUCOSE SERPL-MCNC: 116 MG/DL (ref 70–99)
HCT VFR BLD AUTO: 36.1 % (ref 40–53)
HGB BLD-MCNC: 12 G/DL (ref 13.3–17.7)
IMM GRANULOCYTES # BLD: 0.2 10E9/L (ref 0–0.4)
IMM GRANULOCYTES NFR BLD: 2.4 %
LYMPHOCYTES # BLD AUTO: 2.4 10E9/L (ref 0.8–5.3)
LYMPHOCYTES NFR BLD AUTO: 29 %
MCH RBC QN AUTO: 29.3 PG (ref 26.5–33)
MCHC RBC AUTO-ENTMCNC: 33.2 G/DL (ref 31.5–36.5)
MCV RBC AUTO: 88 FL (ref 78–100)
MONOCYTES # BLD AUTO: 0.6 10E9/L (ref 0–1.3)
MONOCYTES NFR BLD AUTO: 7.8 %
NEUTROPHILS # BLD AUTO: 4.6 10E9/L (ref 1.6–8.3)
NEUTROPHILS NFR BLD AUTO: 56.2 %
NRBC # BLD AUTO: 0 10*3/UL
NRBC BLD AUTO-RTO: 0 /100
OPIATES UR QL SCN: NEGATIVE
PLATELET # BLD AUTO: 256 10E9/L (ref 150–450)
POTASSIUM SERPL-SCNC: 3.8 MMOL/L (ref 3.4–5.3)
PROT SERPL-MCNC: 6.3 G/DL (ref 6.8–8.8)
RBC # BLD AUTO: 4.09 10E12/L (ref 4.4–5.9)
SODIUM SERPL-SCNC: 139 MMOL/L (ref 133–144)
WBC # BLD AUTO: 8.2 10E9/L (ref 4–11)

## 2019-10-15 PROCEDURE — 99285 EMERGENCY DEPT VISIT HI MDM: CPT | Performed by: PSYCHIATRY & NEUROLOGY

## 2019-10-15 PROCEDURE — 36415 COLL VENOUS BLD VENIPUNCTURE: CPT | Performed by: PSYCHIATRY & NEUROLOGY

## 2019-10-15 PROCEDURE — 80320 DRUG SCREEN QUANTALCOHOLS: CPT | Performed by: FAMILY MEDICINE

## 2019-10-15 PROCEDURE — 99285 EMERGENCY DEPT VISIT HI MDM: CPT | Mod: Z6 | Performed by: PSYCHIATRY & NEUROLOGY

## 2019-10-15 PROCEDURE — 80053 COMPREHEN METABOLIC PANEL: CPT | Performed by: PSYCHIATRY & NEUROLOGY

## 2019-10-15 PROCEDURE — 85025 COMPLETE CBC W/AUTO DIFF WBC: CPT | Performed by: PSYCHIATRY & NEUROLOGY

## 2019-10-15 PROCEDURE — 80307 DRUG TEST PRSMV CHEM ANLYZR: CPT | Performed by: FAMILY MEDICINE

## 2019-10-15 ASSESSMENT — ENCOUNTER SYMPTOMS
ABDOMINAL PAIN: 0
DYSPHORIC MOOD: 1
FEVER: 0
HALLUCINATIONS: 0
SHORTNESS OF BREATH: 0
NERVOUS/ANXIOUS: 1

## 2019-10-15 ASSESSMENT — MIFFLIN-ST. JEOR: SCORE: 1753.04

## 2019-10-15 NOTE — ED NOTES
I have performed an in person assessment of the patient. Based on this assessment the patient no longer requires a one on one attendant at this point in time.    Corbin Herrera MD  6:22 PM  October 15, 2019         Corbin Herrera MD  10/15/19 1829

## 2019-10-15 NOTE — ED TRIAGE NOTES
Pt states having suicidal thoughts and depression.  Pt states he is not on meds and does not have a therapist and needs help.  Pt denies having taken anything in an attempt to harm him self.

## 2019-10-16 PROBLEM — F32.9 MDD (MAJOR DEPRESSIVE DISORDER): Status: ACTIVE | Noted: 2019-10-16

## 2019-10-16 LAB
CHOLEST SERPL-MCNC: 177 MG/DL
DEPRECATED CALCIDIOL+CALCIFEROL SERPL-MC: 14 UG/L (ref 20–75)
FERRITIN SERPL-MCNC: 44 NG/ML (ref 26–388)
FOLATE SERPL-MCNC: 8.5 NG/ML
HCT VFR BLD AUTO: 37.3 % (ref 40–53)
HDLC SERPL-MCNC: 46 MG/DL
IRON SATN MFR SERPL: 20 % (ref 15–46)
IRON SERPL-MCNC: 67 UG/DL (ref 35–180)
LDLC SERPL CALC-MCNC: 105 MG/DL
NONHDLC SERPL-MCNC: 131 MG/DL
TIBC SERPL-MCNC: 328 UG/DL (ref 240–430)
TRIGL SERPL-MCNC: 128 MG/DL
TSH SERPL DL<=0.005 MIU/L-ACNC: 2.23 MU/L (ref 0.4–4)
VIT B12 SERPL-MCNC: 325 PG/ML (ref 193–986)

## 2019-10-16 PROCEDURE — 85014 HEMATOCRIT: CPT | Performed by: PSYCHIATRY & NEUROLOGY

## 2019-10-16 PROCEDURE — 83550 IRON BINDING TEST: CPT | Performed by: PHYSICIAN ASSISTANT

## 2019-10-16 PROCEDURE — 82728 ASSAY OF FERRITIN: CPT | Performed by: PHYSICIAN ASSISTANT

## 2019-10-16 PROCEDURE — 40000893 ZZH STATISTIC PT IP EVAL DEFER

## 2019-10-16 PROCEDURE — 83540 ASSAY OF IRON: CPT | Performed by: PHYSICIAN ASSISTANT

## 2019-10-16 PROCEDURE — 99223 1ST HOSP IP/OBS HIGH 75: CPT | Performed by: PHYSICIAN ASSISTANT

## 2019-10-16 PROCEDURE — 99221 1ST HOSP IP/OBS SF/LOW 40: CPT | Mod: AI | Performed by: PSYCHIATRY & NEUROLOGY

## 2019-10-16 PROCEDURE — 84443 ASSAY THYROID STIM HORMONE: CPT | Performed by: PSYCHIATRY & NEUROLOGY

## 2019-10-16 PROCEDURE — 25000132 ZZH RX MED GY IP 250 OP 250 PS 637: Performed by: PHYSICIAN ASSISTANT

## 2019-10-16 PROCEDURE — 80061 LIPID PANEL: CPT | Performed by: PSYCHIATRY & NEUROLOGY

## 2019-10-16 PROCEDURE — 12400002 ZZH R&B MH SENIOR/ADOLESCENT

## 2019-10-16 PROCEDURE — 99207 ZZC CDG-HISTORY COMP: MEETS EXP. PROBLEM FOCUSED-DOWN CODED LACK OF ROS: CPT | Performed by: PSYCHIATRY & NEUROLOGY

## 2019-10-16 PROCEDURE — 82607 VITAMIN B-12: CPT | Performed by: PSYCHIATRY & NEUROLOGY

## 2019-10-16 PROCEDURE — 99207 ZZC CDG-CODE CATEGORY CHANGED: CPT | Performed by: PHYSICIAN ASSISTANT

## 2019-10-16 PROCEDURE — 82306 VITAMIN D 25 HYDROXY: CPT | Performed by: PSYCHIATRY & NEUROLOGY

## 2019-10-16 PROCEDURE — 36415 COLL VENOUS BLD VENIPUNCTURE: CPT | Performed by: PHYSICIAN ASSISTANT

## 2019-10-16 PROCEDURE — 25000132 ZZH RX MED GY IP 250 OP 250 PS 637: Performed by: PSYCHIATRY & NEUROLOGY

## 2019-10-16 PROCEDURE — 82746 ASSAY OF FOLIC ACID SERUM: CPT | Performed by: PSYCHIATRY & NEUROLOGY

## 2019-10-16 PROCEDURE — 36415 COLL VENOUS BLD VENIPUNCTURE: CPT | Performed by: PSYCHIATRY & NEUROLOGY

## 2019-10-16 RX ORDER — LANOLIN ALCOHOL/MO/W.PET/CERES
3 CREAM (GRAM) TOPICAL
Status: DISCONTINUED | OUTPATIENT
Start: 2019-10-16 | End: 2019-10-16

## 2019-10-16 RX ORDER — OLANZAPINE 10 MG/2ML
10 INJECTION, POWDER, FOR SOLUTION INTRAMUSCULAR
Status: DISCONTINUED | OUTPATIENT
Start: 2019-10-16 | End: 2019-10-21 | Stop reason: HOSPADM

## 2019-10-16 RX ORDER — PRASUGREL 10 MG/1
10 TABLET, FILM COATED ORAL DAILY
Status: DISCONTINUED | OUTPATIENT
Start: 2019-10-16 | End: 2019-10-16

## 2019-10-16 RX ORDER — IPRATROPIUM BROMIDE AND ALBUTEROL SULFATE 2.5; .5 MG/3ML; MG/3ML
1 SOLUTION RESPIRATORY (INHALATION) EVERY 4 HOURS PRN
Status: DISCONTINUED | OUTPATIENT
Start: 2019-10-16 | End: 2019-10-16

## 2019-10-16 RX ORDER — ONDANSETRON 4 MG/1
4 TABLET, ORALLY DISINTEGRATING ORAL EVERY 6 HOURS PRN
COMMUNITY

## 2019-10-16 RX ORDER — DEXTROAMPHETAMINE SACCHARATE, AMPHETAMINE ASPARTATE, DEXTROAMPHETAMINE SULFATE AND AMPHETAMINE SULFATE 5; 5; 5; 5 MG/1; MG/1; MG/1; MG/1
20 TABLET ORAL 2 TIMES DAILY
Status: ON HOLD | COMMUNITY
End: 2019-10-21

## 2019-10-16 RX ORDER — OLANZAPINE 10 MG/1
10 TABLET ORAL
Status: DISCONTINUED | OUTPATIENT
Start: 2019-10-16 | End: 2019-10-21 | Stop reason: HOSPADM

## 2019-10-16 RX ORDER — IBUPROFEN 400 MG/1
800 TABLET, FILM COATED ORAL EVERY 6 HOURS PRN
Status: DISCONTINUED | OUTPATIENT
Start: 2019-10-16 | End: 2019-10-21 | Stop reason: HOSPADM

## 2019-10-16 RX ORDER — ETODOLAC 400 MG
400 TABLET ORAL 2 TIMES DAILY PRN
Status: DISCONTINUED | OUTPATIENT
Start: 2019-10-16 | End: 2019-10-16

## 2019-10-16 RX ORDER — DIVALPROEX SODIUM 250 MG/1
250 TABLET, DELAYED RELEASE ORAL 3 TIMES DAILY
Status: DISCONTINUED | OUTPATIENT
Start: 2019-10-16 | End: 2019-10-21 | Stop reason: HOSPADM

## 2019-10-16 RX ORDER — LISINOPRIL 2.5 MG/1
10 TABLET ORAL DAILY
Status: DISCONTINUED | OUTPATIENT
Start: 2019-10-16 | End: 2019-10-21 | Stop reason: HOSPADM

## 2019-10-16 RX ORDER — ERGOCALCIFEROL 1.25 MG/1
50000 CAPSULE, LIQUID FILLED ORAL
Status: DISCONTINUED | OUTPATIENT
Start: 2019-10-16 | End: 2019-10-21 | Stop reason: HOSPADM

## 2019-10-16 RX ORDER — ALBUTEROL SULFATE 90 UG/1
2 AEROSOL, METERED RESPIRATORY (INHALATION) EVERY 6 HOURS PRN
Status: DISCONTINUED | OUTPATIENT
Start: 2019-10-16 | End: 2019-10-21 | Stop reason: HOSPADM

## 2019-10-16 RX ORDER — METOPROLOL SUCCINATE 50 MG/1
50 TABLET, EXTENDED RELEASE ORAL DAILY
Status: ON HOLD | COMMUNITY
End: 2019-10-21

## 2019-10-16 RX ORDER — AMLODIPINE BESYLATE 10 MG/1
10 TABLET ORAL DAILY
Status: DISCONTINUED | OUTPATIENT
Start: 2019-10-16 | End: 2019-10-21 | Stop reason: HOSPADM

## 2019-10-16 RX ORDER — ASPIRIN 81 MG/1
81 TABLET ORAL DAILY
Status: DISCONTINUED | OUTPATIENT
Start: 2019-10-16 | End: 2019-10-21 | Stop reason: HOSPADM

## 2019-10-16 RX ORDER — PRASUGREL 10 MG/1
10 TABLET, FILM COATED ORAL DAILY
Status: DISCONTINUED | OUTPATIENT
Start: 2019-10-16 | End: 2019-10-16 | Stop reason: CLARIF

## 2019-10-16 RX ORDER — LISINOPRIL 20 MG/1
20 TABLET ORAL DAILY
Status: DISCONTINUED | OUTPATIENT
Start: 2019-10-16 | End: 2019-10-16

## 2019-10-16 RX ORDER — CLOPIDOGREL BISULFATE 75 MG/1
75 TABLET ORAL DAILY
COMMUNITY

## 2019-10-16 RX ORDER — EZETIMIBE 10 MG/1
10 TABLET ORAL DAILY
Status: DISCONTINUED | OUTPATIENT
Start: 2019-10-16 | End: 2019-10-21 | Stop reason: HOSPADM

## 2019-10-16 RX ORDER — GABAPENTIN 100 MG/1
100-300 CAPSULE ORAL 3 TIMES DAILY PRN
Status: DISCONTINUED | OUTPATIENT
Start: 2019-10-16 | End: 2019-10-21 | Stop reason: HOSPADM

## 2019-10-16 RX ORDER — ATORVASTATIN CALCIUM 10 MG/1
10 TABLET, FILM COATED ORAL DAILY
Status: DISCONTINUED | OUTPATIENT
Start: 2019-10-16 | End: 2019-10-16

## 2019-10-16 RX ORDER — ALUMINA, MAGNESIA, AND SIMETHICONE 2400; 2400; 240 MG/30ML; MG/30ML; MG/30ML
30 SUSPENSION ORAL EVERY 4 HOURS PRN
Status: DISCONTINUED | OUTPATIENT
Start: 2019-10-16 | End: 2019-10-21 | Stop reason: HOSPADM

## 2019-10-16 RX ORDER — METOPROLOL SUCCINATE 50 MG/1
50 TABLET, EXTENDED RELEASE ORAL DAILY
Status: DISCONTINUED | OUTPATIENT
Start: 2019-10-16 | End: 2019-10-21 | Stop reason: HOSPADM

## 2019-10-16 RX ORDER — ROSUVASTATIN CALCIUM 40 MG/1
40 TABLET, COATED ORAL EVERY EVENING
COMMUNITY

## 2019-10-16 RX ORDER — TRAZODONE HYDROCHLORIDE 50 MG/1
50 TABLET, FILM COATED ORAL
Status: DISCONTINUED | OUTPATIENT
Start: 2019-10-16 | End: 2019-10-16

## 2019-10-16 RX ORDER — AMLODIPINE BESYLATE 5 MG/1
5 TABLET ORAL DAILY
Status: DISCONTINUED | OUTPATIENT
Start: 2019-10-16 | End: 2019-10-16

## 2019-10-16 RX ORDER — DIVALPROEX SODIUM 250 MG/1
250 TABLET, DELAYED RELEASE ORAL EVERY 8 HOURS SCHEDULED
Status: DISCONTINUED | OUTPATIENT
Start: 2019-10-16 | End: 2019-10-16

## 2019-10-16 RX ORDER — ACETAMINOPHEN 325 MG/1
650 TABLET ORAL EVERY 4 HOURS PRN
Status: DISCONTINUED | OUTPATIENT
Start: 2019-10-16 | End: 2019-10-21 | Stop reason: HOSPADM

## 2019-10-16 RX ORDER — ETODOLAC 400 MG
400 TABLET ORAL 2 TIMES DAILY PRN
Status: ON HOLD | COMMUNITY
End: 2019-10-21

## 2019-10-16 RX ORDER — TAMSULOSIN HYDROCHLORIDE 0.4 MG/1
0.4 CAPSULE ORAL DAILY
Status: ON HOLD | COMMUNITY
End: 2019-10-16

## 2019-10-16 RX ORDER — AMLODIPINE BESYLATE 10 MG/1
10 TABLET ORAL DAILY
Status: ON HOLD | COMMUNITY
End: 2019-10-21

## 2019-10-16 RX ORDER — LISINOPRIL 10 MG/1
10 TABLET ORAL DAILY
Status: ON HOLD | COMMUNITY
End: 2019-10-21

## 2019-10-16 RX ORDER — ROSUVASTATIN CALCIUM 20 MG/1
40 TABLET, COATED ORAL EVERY EVENING
Status: DISCONTINUED | OUTPATIENT
Start: 2019-10-16 | End: 2019-10-21 | Stop reason: HOSPADM

## 2019-10-16 RX ORDER — ONDANSETRON 4 MG/1
4 TABLET, ORALLY DISINTEGRATING ORAL EVERY 6 HOURS PRN
Status: DISCONTINUED | OUTPATIENT
Start: 2019-10-16 | End: 2019-10-21 | Stop reason: HOSPADM

## 2019-10-16 RX ORDER — HYDROXYZINE HYDROCHLORIDE 25 MG/1
25 TABLET, FILM COATED ORAL EVERY 4 HOURS PRN
Status: DISCONTINUED | OUTPATIENT
Start: 2019-10-16 | End: 2019-10-21 | Stop reason: HOSPADM

## 2019-10-16 RX ORDER — OMEGA-3-ACID ETHYL ESTERS 1 G/1
2 CAPSULE, LIQUID FILLED ORAL 2 TIMES DAILY
Status: ON HOLD | COMMUNITY
End: 2019-10-21

## 2019-10-16 RX ORDER — CLOPIDOGREL BISULFATE 75 MG/1
75 TABLET ORAL DAILY
Status: DISCONTINUED | OUTPATIENT
Start: 2019-10-16 | End: 2019-10-21 | Stop reason: HOSPADM

## 2019-10-16 RX ORDER — IBUPROFEN 800 MG/1
800 TABLET, FILM COATED ORAL EVERY 6 HOURS PRN
COMMUNITY

## 2019-10-16 RX ORDER — BISACODYL 10 MG
10 SUPPOSITORY, RECTAL RECTAL DAILY PRN
Status: DISCONTINUED | OUTPATIENT
Start: 2019-10-16 | End: 2019-10-21 | Stop reason: HOSPADM

## 2019-10-16 RX ADMIN — ROSUVASTATIN CALCIUM 40 MG: 20 TABLET, FILM COATED ORAL at 21:11

## 2019-10-16 RX ADMIN — DIVALPROEX SODIUM 250 MG: 250 TABLET, DELAYED RELEASE ORAL at 21:12

## 2019-10-16 RX ADMIN — ASPIRIN 81 MG: 81 TABLET ORAL at 08:32

## 2019-10-16 RX ADMIN — HYDROXYZINE HYDROCHLORIDE 25 MG: 25 TABLET, FILM COATED ORAL at 10:34

## 2019-10-16 RX ADMIN — LISINOPRIL 10 MG: 2.5 TABLET ORAL at 14:19

## 2019-10-16 RX ADMIN — METOPROLOL SUCCINATE 50 MG: 50 TABLET, EXTENDED RELEASE ORAL at 14:19

## 2019-10-16 RX ADMIN — ERGOCALCIFEROL 50000 UNITS: 1.25 CAPSULE, LIQUID FILLED ORAL at 15:02

## 2019-10-16 RX ADMIN — IBUPROFEN 800 MG: 400 TABLET ORAL at 12:42

## 2019-10-16 RX ADMIN — AMLODIPINE BESYLATE 10 MG: 10 TABLET ORAL at 14:18

## 2019-10-16 RX ADMIN — EZETIMIBE 10 MG: 10 TABLET ORAL at 08:32

## 2019-10-16 RX ADMIN — ACETAMINOPHEN 650 MG: 325 TABLET, FILM COATED ORAL at 10:33

## 2019-10-16 RX ADMIN — CLOPIDOGREL BISULFATE 75 MG: 75 TABLET, FILM COATED ORAL at 14:19

## 2019-10-16 ASSESSMENT — ACTIVITIES OF DAILY LIVING (ADL)
RETIRED_COMMUNICATION: 2-->DIFFICULTY UNDERSTANDING (NOT RELATED TO LANGUAGE BARRIER)
HYGIENE/GROOMING: INDEPENDENT
RETIRED_EATING: 1-->ASSISTIVE EQUIPMENT
COGNITION: 0 - NO COGNITION ISSUES REPORTED
DRESS: INDEPENDENT
HYGIENE/GROOMING: INDEPENDENT
SWALLOWING: 2-->DIFFICULTY SWALLOWING LIQUIDS
ORAL_HYGIENE: INDEPENDENT
ORAL_HYGIENE: INDEPENDENT
DRESS: INDEPENDENT
TOILETING: 1-->ASSISTIVE EQUIPMENT
AMBULATION: 0-->INDEPENDENT
DRESS: 1-->ASSISTIVE EQUIPMENT
TRANSFERRING: 0-->INDEPENDENT
BATHING: 1-->ASSISTIVE EQUIPMENT
FALL_HISTORY_WITHIN_LAST_SIX_MONTHS: NO

## 2019-10-16 ASSESSMENT — MIFFLIN-ST. JEOR: SCORE: 1753.04

## 2019-10-16 NOTE — PLAN OF CARE
"PT order received and chart reviewed. Called to speak to nurse on unit as therapy may have time to see patient today. Per nurse patient is mobilizing well on unit without an assistive device. Patient does endorse \"discomfort\" but plans to wear TEDS from home. At this time will hold 24 hours and check back in tomorrow to identify if any further balance concerns have been identified.   "

## 2019-10-16 NOTE — PROGRESS NOTES
10/16/19 0032   Patient Belongings   Did you bring any home meds/supplements to the hospital?  No   Patient Belongings locker   Patient Belongings Put in Hospital Secure Location (Security or Locker, etc.) clothing;shoes   Belongings Search Yes   Clothing Search Yes   Second Staff Reji ONTIVEROS   Placed in patient's locker: jeans, yoga pants, long-sleeved shirt, black jacket, belt, black shoes, pair of white socks        A               Admission:  I am responsible for any personal items that are not sent to the safe or pharmacy.  [facility] is not responsible for loss, theft or damage of any property in my possession.    Signature:  _________________________________ Date: _______  Time: _____                                              Staff Signature:  ____________________________ Date: ________  Time: _____      2nd Staff person, if patient is unable/unwilling to sign:    Signature: ________________________________ Date: ________  Time: _____     Discharge:  Yves has returned all of my personal belongings:    Signature: _________________________________ Date: ________  Time: _____                                          Staff Signature:  ____________________________ Date: ________  Time: _____

## 2019-10-16 NOTE — ED PROVIDER NOTES
History     Chief Complaint   Patient presents with     Suicidal     The history is provided by the patient, medical records and a friend.     Cecil Vasquez is a 48 year old male who comes in with his friend due to feeling like tonight is the night he is going to kill himself. He states he has always had thoughts that he did not want to be alive. He has negative thoughts about himself, anhedonia and hopelessness. He has chronic pain and many medical issues that are stable at this time. He is from [place].  He states he called his friend (his one last person he feels he talks to) and she came up to check on him. He left his safe house he was living in 4 days ago and has been homeless since. He is not on any medications currently. He was on medical marijuana but could not use it at the place he was staying. He states that they called the crisis hotline in [place] and were offered an appointment in a week. He did not feel he would be alive by then.  The friend brought him here due to having brought another friend here to get help.  He has many stories and talks a lot.  He states he was going to cut his wrists today. He states he has tried this in the past and taken an overdose. He denies ever getting any care for this.  He states he has never done therapy or taken medications for mental health. 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 He has a few kids but has no contact with them. He states he has tried to work but cannot keep a job.  He has not worked in several months. He denies any drug use other than some marijuana.  His utox is positive for amphetamines.  He is restricted to [facility]. He has many visits there but all for medical issues and pain with none for mental health.  A few years ago he had a few outpatient visits with a psychiatrist and was started on a stimulant for ADHD along with zoloft and seroquel.  He did not remember doing this.  He is  "pleasant, cooperative and calm.  There is no history of violence per the patient nor any found in the chart.      I have reviewed the Medications, Allergies, Past Medical and Surgical History, and Social History in the Epic system.    Review of Systems   Constitutional: Negative for fever.   Respiratory: Negative for shortness of breath.    Cardiovascular: Negative for chest pain.   Gastrointestinal: Negative for abdominal pain.   Psychiatric/Behavioral: Positive for dysphoric mood, self-injury and suicidal ideas. Negative for hallucinations. The patient is nervous/anxious.    All other systems reviewed and are negative.      Physical Exam   BP: (!) 149/88  Pulse: 78  Temp: 98  F (36.7  C)  Resp: 16  Height: 175.3 cm (5' 9\")  Weight: 90.3 kg (199 lb)  SpO2: 100 %      Physical Exam  Vitals signs and nursing note reviewed.   Constitutional:       Appearance: Normal appearance. He is well-developed.   Cardiovascular:      Rate and Rhythm: Normal rate and regular rhythm.      Heart sounds: Normal heart sounds.   Pulmonary:      Effort: Pulmonary effort is normal. No respiratory distress.      Breath sounds: Normal breath sounds.   Neurological:      Mental Status: He is alert and oriented to person, place, and time.   Psychiatric:         Attention and Perception: Attention and perception normal.         Mood and Affect: Affect normal. Mood is depressed.         Speech: Speech normal.         Behavior: Behavior normal. Behavior is cooperative.         Thought Content: Thought content is not paranoid or delusional. Thought content includes suicidal ideation. Thought content does not include homicidal ideation. Thought content includes suicidal plan. Thought content does not include homicidal plan.         Cognition and Memory: Cognition and memory normal.         Judgement: Judgment normal.      Comments: Cecil is a 47 y/o male who looks his age. He is well groomed with good eye contact.          ED Course      "   Procedures            Labs Ordered and Resulted from Time of ED Arrival Up to the Time of Departure from the ED   DRUG ABUSE SCREEN 6 CHEM DEP URINE ([facility]) - Abnormal; Notable for the following components:       Result Value    Amphetamine Qual Urine Positive (*)     Cannabinoids Qual Urine Positive (*)     All other components within normal limits   CBC WITH PLATELETS DIFFERENTIAL   COMPREHENSIVE METABOLIC PANEL            Assessments & Plan (with Medical Decision Making)   Cecil will be admitted to the hospital due to his depression, hopelessness, anhedonia and suicidal thought with a plan and alleged past attempts.  There are some red flags for some antisocial personality and possible secondary gain such as recently becoming homeless, his drug use despite denying and being restricted to a hospital.  There is no mention of these things in the record that I can find.  He did state he has never done any treatment or medications for mental health but there was a few visits with psychiatry and a trial of meds.  This may be something he just forgot.  At this time he will get the benefit of the doubt and be admitted.  He is very pleasant, calm and cooperative.  There are no current signs of violence and no history of this per the records.  At the time of this note, there was no bed available.  He will stay in the ED until a bed is open.      I have reviewed the nursing notes.    I have reviewed the findings, diagnosis, plan and need for follow up with the patient.  ADDEDNUM:  A bed opened up on station 3b under Dr. Baig.      New Prescriptions    No medications on file       Final diagnoses:   Current severe episode of major depressive disorder without psychotic features, unspecified whether recurrent (H)       10/15/2019   [facility], [place], EMERGENCY DEPARTMENT     Alvarez Mancilla MD  10/15/19 9480       Alvarez Mancilla MD  10/15/19 5196

## 2019-10-16 NOTE — PROGRESS NOTES
Pt slept late this AM. Declined breakfast. Pt med compliant with all medications that are  available from pharmacy. Pt later in AM awake and went in to group. Pt when asked reports he still feels suicidal.
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
## 2019-10-16 NOTE — PROGRESS NOTES
Many of Pts AM meds still not available. Have messaged Pharmacy  Several times since 0800 and have called pharmacy several times since 0800. Still no meds.

## 2019-10-16 NOTE — ED NOTES
"ED to Behavioral Floor Handoff    SITUATION  Cecil Vasquez is a 48 year old male who speaks English and lives in a home alone The patient arrived in the ED by private car from home with a complaint of Suicidal  .The patient's current symptoms started/worsened \"all my life\" ago and during this time the symptoms have increased.   In the ED, pt was diagnosed with   Final diagnoses:   Current severe episode of major depressive disorder without psychotic features, unspecified whether recurrent (H)        Initial vitals were: BP: (!) 149/88  Pulse: 78  Temp: 98  F (36.7  C)  Resp: 16  Height: 175.3 cm (5' 9\")  Weight: 90.3 kg (199 lb)  SpO2: 100 %   --------  Is the patient diabetic? No   If yes, last blood glucose? --     If yes, was this treated in the ED? --  --------  Is the patient inebriated (ETOH) No or Impaired on other substances? No  MSSA done? N/A  Last MSSA score: --    Were withdrawal symptoms treated? N/A  Does the patient have a seizure history? No. If yes, date of most recent seizure--  --------  Is the patient patient experiencing suicidal ideation? reports occasional suicidal thoughts representing feeling that life is not worth feeling    Homicidal ideation? denies current or recent homicidal ideation or behaviors.    Self-injurious behavior/urges? reports current or recent self injurious behavior or ideation including \"hitting myself in the chest\" last SIB was yesterday.  ------  Was pt aggressive in the ED No  Was a code called No  Is the pt now cooperative? Yes  -------  Meds given in ED: Medications - No data to display   Family present during ED course? Yes  Family currently present? No    BACKGROUND  Does the patient have a cognitive impairment or developmental disability? No  Allergies:   Allergies   Allergen Reactions     No Known Drug Allergies    .   Social demographics are   Social History     Socioeconomic History     Marital status:      Spouse name: None     Number of children: " None     Years of education: None     Highest education level: None   Occupational History     None   Social Needs     Financial resource strain: None     Food insecurity:     Worry: None     Inability: None     Transportation needs:     Medical: None     Non-medical: None   Tobacco Use     Smoking status: Former Smoker     Packs/day: 0.50     Types: Cigarettes     Smokeless tobacco: Never Used   Substance and Sexual Activity     Alcohol use: No     Alcohol/week: 0.0 standard drinks     Drug use: No     Sexual activity: Not Currently   Lifestyle     Physical activity:     Days per week: None     Minutes per session: None     Stress: None   Relationships     Social connections:     Talks on phone: None     Gets together: None     Attends Alevism service: None     Active member of club or organization: None     Attends meetings of clubs or organizations: None     Relationship status: None     Intimate partner violence:     Fear of current or ex partner: None     Emotionally abused: None     Physically abused: None     Forced sexual activity: None   Other Topics Concern     Parent/sibling w/ CABG, MI or angioplasty before 65F 55M? Not Asked      Service No     Blood Transfusions No     Caffeine Concern No     Occupational Exposure No     Hobby Hazards No     Sleep Concern Yes     Stress Concern No     Weight Concern Yes     Special Diet No     Comment: low sodium     Back Care No     Exercise No     Bike Helmet Not Asked     Seat Belt Yes     Self-Exams Yes   Social History Narrative        3 children    Working at [employer]        ASSESSMENT  Labs results   Labs Ordered and Resulted from Time of ED Arrival Up to the Time of Departure from the ED   DRUG ABUSE SCREEN 6 CHEM DEP URINE ([facility]) - Abnormal; Notable for the following components:       Result Value    Amphetamine Qual Urine Positive (*)     Cannabinoids Qual Urine Positive (*)     All other components within normal limits   CBC WITH  "PLATELETS DIFFERENTIAL   COMPREHENSIVE METABOLIC PANEL      Imaging Studies: No results found for this or any previous visit (from the past 24 hour(s)).   Most recent vital signs BP (!) 149/88   Pulse 78   Temp 98  F (36.7  C) (Oral)   Resp 16   Ht 1.753 m (5' 9\")   Wt 90.3 kg (199 lb)   SpO2 100%   BMI 29.39 kg/m     Abnormal labs/tests/findings requiring intervention:---   Pain control: fair  Nausea control: pt had none    RECOMMENDATION  Are any infection precautions needed (MRSA, VRE, etc.)? No If yes, what infection? --  ---  Does the patient have mobility issues? independently. If yes, what device does the pt use? ---  ---  Is patient on 72 hour hold or commitment? No If on 72 hour hold, have hold and rights been given to patient? N/A  Are admitting orders written if after 10 p.m. ?N/A  Tasks needing to be completed:---     Flakita Clement RN   [facility]-- [phone] [facility]     "

## 2019-10-16 NOTE — PLAN OF CARE
Problem: OT General Care Plan  Goal: OT Goal 1  Description  Will consistently attend OT groups and improve coping strategies with increasing repertoire of ideas and understanding of symptoms of when to use the strategies.   Note:   Attended a partial OT group then was called out to meet with staff. He appeared interested and motivated in attending as he immediately left his room and went to the group room on invitation. Will encourage attendance and assess further with additional attendance. Support all efforts of involvement.

## 2019-10-16 NOTE — PLAN OF CARE
Problem: Adult Behavioral Health Plan of Care  Goal: Team Discussion  Description  Team Plan:  Outcome: No Change  Note:   BEHAVIORAL TEAM DISCUSSION    Participants: Dr. Francisco Baig MD, Allison TINOCO, Benjamin Winston RN  Progress: New admit  Anticipated length of stay: Unknown  Continued Stay Criteria/Rationale: Suicidal ideation  Medical/Physical: unknown  Precautions:   Behavioral Orders   Procedures    Code 1 - Restrict to Unit    Routine Programming     As clinically indicated    Self Injury Precaution    Status 15     Every 15 minutes.    Suicide precautions     Patients on Suicide Precautions should have a Combination Diet ordered that includes a Diet selection(s) AND a Behavioral Tray selection for Safe Tray - with utensils, or Safe Tray - NO utensils       Plan: Psychiatric Assessment. Medication Management. Therapeutic Mileu. Individual and group support.   Rationale for change in precautions or plan: Initial plan

## 2019-10-16 NOTE — PLAN OF CARE
Problem: General Plan of Care (Inpatient Behavioral)  Goal: Individualization/Patient Specific Goal (IP Behavioral)  Description  The patient and/or their representative will achieve their patient-specific goals related to the plan of care.    The patient-specific goals include:  Outcome: No Change  Flowsheets (Taken 10/16/2019 4178)  Areas of Vulnerability: No mental health providers, unemployed, lack of support  Patient Strengths: Utilized support systems     The patient completed the reasons for admit and goals for discharge in the personal plan of care.   Reasons for admit:  1)  Suicidal thoughts       Goals for discharge:  1)  Medication for anxiety  2)  Develop social skills  3)  Set up with outpatient providers  4) Process childhood trauma
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
## 2019-10-16 NOTE — CONSULTS
Internal Medicine Initial Visit    Cecil Vasquez MRN# [id number]   Age: 48 year old YOB: 1970   Date of Admission: 10/15/2019     Reason for consult: CAD        Requesting physician Dr. Arash THURSTON   HPI:   Cecil Vasquez is a 48 year old male with history of ADHD, polysubstance abuse, nephrolithiasis, HTN, CAD (s/p multiple stents), chronic pain with trigeminal neuralgia, peripheral neuropathy admitted to station 3B for worsening anxiety and suicidal ideation. Patient reports his anxiety has gotten increasing worse over the last several years and it is now inhibiting his ability to work. He reports SOB intermittently that he associates with anxiety. He also notes a stutter that has returned with increased anxiety (had a stutter as a child.) He has been having negative thoughts about himself and hopelessness. He associates some of his hopelessness with chronic pain (trigeminal neuralgia and peripheral neuropathy) for which he has seen many specialists for with trial of medications including narcotics, gabapentin, NSAIDS and tylenol as well as steroid injection with little relief. He has a history significant for CAD with stents placed about 2 years ago. He denies any recent chest pain or palpitations. Notes SOB associated with anxiety. Denies REECE or orthopnea. He is currently trying to establish care with Cardiology in [place]. Notes he is compliant with his medications. He notes he was recently seen in [place] for flank pain and hematuria. Has a significant history of nephroliths s/p multiple lithotripsy. He reports he feels he passed a stone and no longer has any pain or hematuria.      Past Medical History:     Past Medical History:   Diagnosis Date     ADHD (attention deficit hyperactivity disorder)      Bipolar 1 disorder (H)      Depression      Gastro-oesophageal reflux disease      Hyperlipidaemia      Hypertension      Impaired fasting glucose      Nephrolithiasis       Obesity      Reactive airway disease     uses albuterol inhaler when has cold     Renal calcification      Rosacea      Tobacco abuse      Trigeminal neuralgia      Uncomplicated asthma       Reviewed & updated in [facility].     Past Surgical History:      Past Surgical History:   Procedure Laterality Date     COMBINED CYSTOSCOPY, INSERT CATHETER URETER Left 3/25/2016    Procedure: COMBINED CYSTOSCOPY, INSERT CATHETER URETER;  Surgeon: Jorden Villafana MD;  Location:  OR     COMBINED CYSTOSCOPY, RETROGRADES, URETEROSCOPY, LASER HOLMIUM LITHOTRIPSY URETER(S), INSERT STENT Left 4/13/2016    Procedure: COMBINED CYSTOSCOPY, RETROGRADES, URETEROSCOPY, LASER HOLMIUM LITHOTRIPSY URETER(S), INSERT STENT;  Surgeon: Jorden Villafana MD;  Location:  OR     EXTRACORPOREAL SHOCK WAVE LITHOTRIPSY (ESWL) Left 3/25/2016    Procedure: EXTRACORPOREAL SHOCK WAVE LITHOTRIPSY (ESWL);  Surgeon: Jorden Villafana MD;  Location:  OR     EXTRACORPOREAL SHOCK WAVE LITHOTRIPSY, CYSTOSCOPY, INSERT STENT URETER(S), COMBINED Left 3/25/2016    Procedure: COMBINED EXTRACORPOREAL SHOCK WAVE LITHOTRIPSY, CYSTOSCOPY, INSERT STENT URETER(S);  Surgeon: Jorden Villafana MD;  Location:  OR     GENITOURINARY SURGERY  3 25 2016     Lithotripsy and stent placement     TONSILLECTOMY        Reviewed & updated in Epic.     Medications prior to admission:     Prior to Admission Medications   Prescriptions Last Dose Informant Patient Reported? Taking?   amLODIPine (NORVASC) 10 MG tablet 10/15/2019 at Unknown time  Yes Yes   Sig: Take 10 mg by mouth daily   amphetamine-dextroamphetamine (ADDERALL) 20 MG tablet 10/15/2019 at Unknown time  Yes Yes   Sig: Take 20 mg by mouth 2 times daily   aspirin 81 MG EC tablet 10/15/2019 at Unknown time  No Yes   Sig: Take 1 tablet (81 mg) by mouth daily   clopidogrel (PLAVIX) 75 MG tablet   Yes Yes   Sig: Take 75 mg by mouth daily   etodolac (LODINE) 400 MG tablet   Yes Yes   Sig: Take 400 mg by mouth  2 times daily as needed   ezetimibe (ZETIA) 10 MG tablet 10/15/2019 at Unknown time  No Yes   Sig: Take 1 tablet (10 mg) by mouth daily   ibuprofen (ADVIL/MOTRIN) 800 MG tablet   Yes Yes   Sig: Take 800 mg by mouth every 6 hours as needed for moderate pain   lisinopril (PRINIVIL/ZESTRIL) 10 MG tablet   Yes Yes   Sig: Take 10 mg by mouth daily   metoprolol succinate ER (TOPROL-XL) 50 MG 24 hr tablet 10/15/2019 at Unknown time  Yes Yes   Sig: Take 50 mg by mouth daily   nitroGLYcerin (NITROSTAT) 0.4 MG sublingual tablet Unknown at Unknown time  No No   Sig: For chest pain place 1 tablet under the tongue every 5 minutes for 3 doses. If symptoms persist 5 minutes after 1st dose call 911.   Patient not taking: Reported on 10/6/2017   omega-3 acid ethyl esters (LOVAZA) 1 g capsule 10/15/2019 at Unknown time  Yes Yes   Sig: Take 2 g by mouth 2 times daily   ondansetron (ZOFRAN-ODT) 4 MG ODT tab Unknown at Unknown time  Yes No   Sig: Take 4 mg by mouth every 6 hours as needed for nausea   rosuvastatin (CRESTOR) 40 MG tablet 10/15/2019 at Unknown time  Yes Yes   Sig: Take 40 mg by mouth every evening       Facility-Administered Medications: None         Allergies:     Allergies   Allergen Reactions     No Known Drug Allergies          Social History:     Social History     Socioeconomic History     Marital status:      Spouse name: Not on file     Number of children: Not on file     Years of education: Not on file     Highest education level: Not on file   Occupational History     Not on file   Social Needs     Financial resource strain: Not on file     Food insecurity:     Worry: Not on file     Inability: Not on file     Transportation needs:     Medical: Not on file     Non-medical: Not on file   Tobacco Use     Smoking status: Former Smoker     Packs/day: 0.50     Types: Cigarettes     Smokeless tobacco: Never Used   Substance and Sexual Activity     Alcohol use: No     Alcohol/week: 0.0 standard drinks     Drug  "use: No     Sexual activity: Not Currently   Lifestyle     Physical activity:     Days per week: Not on file     Minutes per session: Not on file     Stress: Not on file   Relationships     Social connections:     Talks on phone: Not on file     Gets together: Not on file     Attends Je[employer] witness service: Not on file     Active member of club or organization: Not on file     Attends meetings of clubs or organizations: Not on file     Relationship status: Not on file     Intimate partner violence:     Fear of current or ex partner: Not on file     Emotionally abused: Not on file     Physically abused: Not on file     Forced sexual activity: Not on file   Other Topics Concern     Parent/sibling w/ CABG, MI or angioplasty before 65F 55M? Not Asked      Service No     Blood Transfusions No     Caffeine Concern No     Occupational Exposure No     Hobby Hazards No     Sleep Concern Yes     Stress Concern No     Weight Concern Yes     Special Diet No     Comment: low sodium     Back Care No     Exercise No     Bike Helmet Not Asked     Seat Belt Yes     Self-Exams Yes   Social History Narrative        3 children    Working at [employer]        Family History:     Family History   Problem Relation Age of Onset     Psychotic Disorder Mother      Diabetes Sister      Psychotic Disorder Sister         Reviewed & updated in Epic.     Review of Systems:   Ten point review of systems negative except as stated above in History of Present Illness.    OBJECTIVE   Physical Exam:   Blood pressure (!) 139/92, pulse 73, temperature 97.5  F (36.4  C), temperature source Tympanic, resp. rate 16, height 1.753 m (5' 9\"), weight 90.3 kg (199 lb), SpO2 98 %.  General: Alert, awake, and in NAD. Non-toxic appearing.  HEENT: NC/AT. Anicteric sclera. Eyes symmetric and free of discharge bilaterally. Mucous membranes moist.  Neck: Supple  Cardiovascular: RRR. S1,S2. No murmurs appreciated.  Lungs: Normal respiratory effort on RA. " Lungs CTAB without wheezes, rales, or rhonchi.  Abdomen: Soft, non-tender, and nondistended with normoactive bowel sounds.  Extremities: Warm & well perfused. No peripheral edema.  Skin: No rashes, jaundice, or lesions on exposed areas of skin.  Neurologic: A&O X 3. Answers questions appropriately. Moves all extremities symmetrically.     Laboratory Data:   CMP  Recent Labs   Lab 10/15/19  2159      POTASSIUM 3.8   CHLORIDE 106   CO2 27   ANIONGAP 6   *   BUN 23   CR 0.96   GFRESTIMATED >90   GFRESTBLACK >90   LÁZARO 8.5   PROTTOTAL 6.3*   ALBUMIN 3.0*   BILITOTAL 0.2   ALKPHOS 84   AST 25   ALT 29       CBC  Recent Labs   Lab 10/16/19  0728 10/15/19  2159   WBC  --  8.2   RBC  --  4.09*   HGB  --  12.0*   HCT 37.3* 36.1*   MCV  --  88   MCH  --  29.3   MCHC  --  33.2   RDW  --  12.4   PLT  --  256       TSH  Recent Labs   Lab 10/16/19  0728   TSH 2.23          Assessment and Plan/Recommendations:   Cecil Vasquez is a 48 year old male with history of ADHD, polysubstance abuse, nephrolithiasis, HTN, CAD (s/p multiple stents), chronic pain with trigeminal neuralgia, peripheral neuropathy admitted to station 3B for worsening anxiety and suicidal ideation    Anxiety   Depression   Suicidal ideation   ADHD   - Management per psychiatry    Coronary artery disease S/p PCE with PHYLLIS to proximal LAD 2/2017.   Stress test 4/2017 negative for ischemia. Currently does not follow with cardiology, however is trying to establish care with Cardiology in [facility]. Reports compliance with medications. Currently denies any chest pain, palpitations. Does reports SOB which he associated with anxiety. Denies REECE or orthopnea. PTA on plavix, ASA and metoprolol   - Continue plavix 75 mg daily, ASA 81 mg daily, and metoprolol 50 mg daily   - Please notify medicine with any chest pain, SOB, dyspnea, palpitations     Normocytic anemia: Hgb 12.0 on admission. MCV 88. Per review of records, hgb has been 12-13 since 2017. Of note,  patient is on DAPT. No signs of active bleeding. Iron studies wnl. B12 and folate wnl.   - Repeat cbc in AM   - Notify medicine if any signs of active bleeding     Vitamin D deficiency: Vitamin D 14.   - Start replacement with ergocalciferol 50,000 units weeks X 8 weeks   - Follow up with PCP in 2 months for repeat vitamin D level and adjustment of vitamin D supplement       HTN PTA on lisinopril 10 mg daily, amlodipine 10 mg daily, metoprolol 50 mg daily. Blood pressure mildly elevated on admission in the setting of anxiety.   - Continue PTA medications, lisinopril, amlodipine and metoprolol   - Monitor blood pressures   - Notify medicine if SBP > 160 or DBP >100     Hyperlipidemia: PTA on Rosuvastatin and ezetimibe (Zetia)    Lab Results   Component Value Date    CHOL 177 10/16/2019     Lab Results   Component Value Date    HDL 46 10/16/2019     Lab Results   Component Value Date     10/16/2019     Lab Results   Component Value Date    TRIG 128 10/16/2019     Lab Results   Component Value Date    CHOLHDLRATIO 6.5 01/13/2015     - Continue PTA medications     Trigeminal neuralgia  Chronic bilateral leg pain, peripheral neurology   Chronic ongoing. Patient has had multiple visits to specialists for chronic pain. Has tried steroid injection in the past with pain specialist as well as gabapentin with minimal relief. He was previously on narcotics for pain, however these have appropriately been weaned. Currently managing pain with tylenol, ibuprofen and compression stocking/clothing.   - Continue PTA ibuprofen 800 mg every 6 hours   - Tylenol PRN   - Continue compression stockings   - Continue to follow up outpatient with pain specialist for ongoing workup and management     Mild persistent asthma: no symptoms of exacerbation.  - PRN albuterol made available      Medicine will follow blood pressures and CBC peripherally. Please page the Internal Medicine job code pager for any intercurrent medical issues which  arise. Thank you for the opportunity to be a part of this patient's care.    Christy Pino PA-C  Hospitalist Service  [phone]

## 2019-10-16 NOTE — PHARMACY-ADMISSION MEDICATION HISTORY
Admission Medication History status for the 10/15/2019 admission is complete.  See EPIC admission navigator for Prior to Admission medications.    Medication history sources:  Patient, Care Everywhere, Sure Scripts     Medication history source reliability: Good    Medication adherence:  Good    Changes made to PTA medication list (reason)  Added: Adderall, Crestor, Plavix, Etodolac, Metoprolol ER, Lovaza  Deleted: Amitriptyline, Lipitor, Oxycodone/APAP, Duoneb, Acetaminophen, Melatonin, Sildenafil, Tamsulosin, Albuterol inhaler, Prasugrel  Changed: Amlodipine 5 mg-->10 mg, Lisinopril 20 mg-->10 mg,     Additional medication history information (including reliability of information, actions taken by pharmacist):   -Pt seemed to know most medications.  -Prasugrel was stopped on 8/1 and pt was switched to Plavix because his Prasugrel wasn't covered by his insurance.     [place] :  Dextroamphetamine-Amphetamine 20 mg was filled on 9/20/19, #60, 30 day supply.     Time spent in this activity: 60 minutes    Medication history completed by: Sondra Francis [facility]     Prior to Admission medications    Medication Sig Last Dose Taking? Auth Provider   amLODIPine (NORVASC) 10 MG tablet Take 10 mg by mouth daily 10/15/2019 at Unknown time Yes Unknown, Entered By History   amphetamine-dextroamphetamine (ADDERALL) 20 MG tablet Take 20 mg by mouth 2 times daily 10/15/2019 at Unknown time Yes Unknown, Entered By History   aspirin 81 MG EC tablet Take 1 tablet (81 mg) by mouth daily 10/15/2019 at Unknown time Yes Gladys Burger MD   clopidogrel (PLAVIX) 75 MG tablet Take 75 mg by mouth daily  Yes Unknown, Entered By History   etodolac (LODINE) 400 MG tablet Take 400 mg by mouth 2 times daily as needed  Yes Unknown, Entered By History   ezetimibe (ZETIA) 10 MG tablet Take 1 tablet (10 mg) by mouth daily 10/15/2019 at Unknown time Yes More, Rose Swan PA-C   ibuprofen (ADVIL/MOTRIN) 800 MG tablet Take 800 mg by mouth  every 6 hours as needed for moderate pain  Yes Reported, Patient   lisinopril (PRINIVIL/ZESTRIL) 10 MG tablet Take 10 mg by mouth daily  Yes Unknown, Entered By History   metoprolol succinate ER (TOPROL-XL) 50 MG 24 hr tablet Take 50 mg by mouth daily 10/15/2019 at Unknown time Yes Unknown, Entered By History   omega-3 acid ethyl esters (LOVAZA) 1 g capsule Take 2 g by mouth 2 times daily 10/15/2019 at Unknown time Yes Unknown, Entered By History   rosuvastatin (CRESTOR) 40 MG tablet Take 40 mg by mouth every evening  10/15/2019 at Unknown time Yes Unknown, Entered By History   nitroGLYcerin (NITROSTAT) 0.4 MG sublingual tablet For chest pain place 1 tablet under the tongue every 5 minutes for 3 doses. If symptoms persist 5 minutes after 1st dose call 911.  Patient not taking: Reported on 10/6/2017 Unknown at Unknown time  Amish Wallace MD   ondansetron (ZOFRAN-ODT) 4 MG ODT tab Take 4 mg by mouth every 6 hours as needed for nausea Unknown at Unknown time  Unknown, Entered By History

## 2019-10-17 LAB
ERYTHROCYTE [DISTWIDTH] IN BLOOD BY AUTOMATED COUNT: 12.5 % (ref 10–15)
HCT VFR BLD AUTO: 39.9 % (ref 40–53)
HGB BLD-MCNC: 13.2 G/DL (ref 13.3–17.7)
MCH RBC QN AUTO: 29.2 PG (ref 26.5–33)
MCHC RBC AUTO-ENTMCNC: 33.1 G/DL (ref 31.5–36.5)
MCV RBC AUTO: 88 FL (ref 78–100)
PLATELET # BLD AUTO: 251 10E9/L (ref 150–450)
RBC # BLD AUTO: 4.52 10E12/L (ref 4.4–5.9)
WBC # BLD AUTO: 7 10E9/L (ref 4–11)

## 2019-10-17 PROCEDURE — G0177 OPPS/PHP; TRAIN & EDUC SERV: HCPCS

## 2019-10-17 PROCEDURE — 25000132 ZZH RX MED GY IP 250 OP 250 PS 637: Performed by: PSYCHIATRY & NEUROLOGY

## 2019-10-17 PROCEDURE — 99232 SBSQ HOSP IP/OBS MODERATE 35: CPT | Performed by: PSYCHIATRY & NEUROLOGY

## 2019-10-17 PROCEDURE — 25000132 ZZH RX MED GY IP 250 OP 250 PS 637: Performed by: PHYSICIAN ASSISTANT

## 2019-10-17 PROCEDURE — 12400002 ZZH R&B MH SENIOR/ADOLESCENT

## 2019-10-17 PROCEDURE — 85027 COMPLETE CBC AUTOMATED: CPT | Performed by: PHYSICIAN ASSISTANT

## 2019-10-17 PROCEDURE — 36415 COLL VENOUS BLD VENIPUNCTURE: CPT | Performed by: PHYSICIAN ASSISTANT

## 2019-10-17 RX ADMIN — DIVALPROEX SODIUM 250 MG: 250 TABLET, DELAYED RELEASE ORAL at 08:25

## 2019-10-17 RX ADMIN — DIVALPROEX SODIUM 250 MG: 250 TABLET, DELAYED RELEASE ORAL at 19:48

## 2019-10-17 RX ADMIN — ROSUVASTATIN CALCIUM 40 MG: 20 TABLET, FILM COATED ORAL at 19:48

## 2019-10-17 RX ADMIN — DIVALPROEX SODIUM 250 MG: 250 TABLET, DELAYED RELEASE ORAL at 14:28

## 2019-10-17 RX ADMIN — LISINOPRIL 10 MG: 2.5 TABLET ORAL at 08:25

## 2019-10-17 RX ADMIN — CLOPIDOGREL BISULFATE 75 MG: 75 TABLET, FILM COATED ORAL at 08:26

## 2019-10-17 RX ADMIN — METOPROLOL SUCCINATE 50 MG: 50 TABLET, EXTENDED RELEASE ORAL at 08:25

## 2019-10-17 RX ADMIN — HYDROXYZINE HYDROCHLORIDE 25 MG: 25 TABLET, FILM COATED ORAL at 12:18

## 2019-10-17 RX ADMIN — AMLODIPINE BESYLATE 10 MG: 10 TABLET ORAL at 08:26

## 2019-10-17 RX ADMIN — EZETIMIBE 10 MG: 10 TABLET ORAL at 08:25

## 2019-10-17 RX ADMIN — ASPIRIN 81 MG: 81 TABLET ORAL at 08:26

## 2019-10-17 RX ADMIN — HYDROXYZINE HYDROCHLORIDE 25 MG: 25 TABLET, FILM COATED ORAL at 16:04

## 2019-10-17 ASSESSMENT — ACTIVITIES OF DAILY LIVING (ADL)
DRESS: SCRUBS (BEHAVIORAL HEALTH)
DRESS: INDEPENDENT
ORAL_HYGIENE: INDEPENDENT
HYGIENE/GROOMING: INDEPENDENT
ORAL_HYGIENE: INDEPENDENT
HYGIENE/GROOMING: SHOWER;INDEPENDENT

## 2019-10-17 ASSESSMENT — MIFFLIN-ST. JEOR: SCORE: 1758.93

## 2019-10-17 NOTE — PROGRESS NOTES
"Brief medicine note:     In short, Cecil Vasquez is a 48 year old male with history of ADHD, polysubstance abuse, nephrolithiasis, HTN, CAD (s/p multiple stents), chronic pain with trigeminal neuralgia, peripheral neuropathy admitted to station 3B for worsening anxiety and suicidal ideation. Medicine follow up on BP and hgb.     Today's vital signs, medications and nursing notes were reviewed.     /72   Pulse 74   Temp 96.3  F (35.7  C) (Tympanic)   Resp 16   Ht 1.753 m (5' 9\")   Wt 90.9 kg (200 lb 4.8 oz)   SpO2 95%   BMI 29.58 kg/m      A/P   Normocytic anemia: Hgb 12.0 on admission. MCV 88. Per review of records, hgb has been 12-13 since 2017. Of note, patient is on DAPT. No signs of active bleeding. Iron studies wnl. B12 and folate wnl. Repeat hgb this AM 13.2.  - Notify medicine if any signs of active bleeding    - Follow up with PCP for ongoing management upon discharge       HTN PTA on lisinopril 10 mg daily, amlodipine 10 mg daily, metoprolol 50 mg daily. Blood pressure mildly elevated on admission in the setting of anxiety.   - Continue PTA medications, lisinopril, amlodipine and metoprolol   - Monitor blood pressures   - Notify medicine if SBP > 160 or DBP >100    - Follow up with PCP for ongoing management on discharge     Currently patient is medically stable and medicine will sign off. Thank you for allowing us to be a part of this patients care. Please notify on call [facility] if any intercurrent medical issues arise.     Christy Pino PA-C  Internal Medicine [facility] Hospitalist   [phone]    "

## 2019-10-17 NOTE — PROGRESS NOTES
[facility], [place]   Psychiatric Progress Note        Interim History:   The patient's care was discussed with the treatment team during the daily team meeting and/or staff's chart notes were reviewed.  Staff report patient is more engaged on approach and visible. Noted that dep and anx improved but vague regarding SI, denied planing. Slept well. Out fore meals and eating well. Attending groups and fully engaged. No overt psychosis, chetan or confusion. Independent with ADL. Compliant with medications.     Met patient with CTC and RN. The patient denied current SI, and believes that dep and anx improved. Tolerating medications well and find Depakote helpful. Slept well. Appetite is intact. No hallucinations or paranoia. Racing and ruminative thought fluctuates. Encouraged to attend groups and participial in programing. He noted that he plans to return to the sober house or stay with a close friend after discharge. He plans to continue DBT and requested referral to psychiatrist. He spoke with his .     Discussed medications and care plan.        Medications:       amLODIPine  10 mg Oral Daily     aspirin  81 mg Oral Daily     clopidogrel  75 mg Oral Daily     divalproex sodium delayed-release  250 mg Oral TID     ezetimibe  10 mg Oral Daily     lisinopril  10 mg Oral Daily     metoprolol succinate ER  50 mg Oral Daily     rosuvastatin  40 mg Oral QPM     vitamin D2  50,000 Units Oral Q7 Days          Allergies:     Allergies   Allergen Reactions     No Known Drug Allergies           Labs:     Recent Results (from the past 24 hour(s))   CBC with platelets    Collection Time: 10/17/19  8:52 AM   Result Value Ref Range    WBC 7.0 4.0 - 11.0 10e9/L    RBC Count 4.52 4.4 - 5.9 10e12/L    Hemoglobin 13.2 (L) 13.3 - 17.7 g/dL    Hematocrit 39.9 (L) 40.0 - 53.0 %    MCV 88 78 - 100 fl    MCH 29.2 26.5 - 33.0 pg    MCHC 33.1 31.5 - 36.5 g/dL    RDW 12.5 10.0 - 15.0 %    Platelet  Count 251 150 - 450 10e9/L          Psychiatric Examination:     Vitals:    10/16/19 1230 10/16/19 1719 10/16/19 1940 10/17/19 0700   BP: (!) 139/92 (!) 144/84 123/79 139/85   Pulse: 73 78 83 72   Resp: 16 16 16 16   Temp: 97.5  F (36.4  C) 97.4  F (36.3  C) 98.8  F (37.1  C) 96.3  F (35.7  C)   TempSrc: Tympanic Tympanic Tympanic Tympanic   SpO2: 98% 94% 98% 97%   Weight:       Height:                         Sitting Orthostatic BP: 139/85      Sitting Orthostatic Pulse: 72 bpm      Standing Orthostatic BP: 125/87      Standing Orthostatic Pulse: 72 bpm       Weight is 199 lbs 0 oz  Body mass index is 29.39 kg/m .    Appearance: awake, alert, adequately groomed, appeared as age stated and no apparent distress  Attitude:  cooperative  Eye Contact:  good  Mood:  anxious and depressed  Affect:  appropriate and in normal range, more engaged  and reactive  Speech:  Rapid but not pressure. clear, coherent and normal prosody  Psychomotor Behavior:  no evidence of tardive dyskinesia, dystonia, or tics and intact station, gait and muscle tone  Throught Process:  linear and goal oriented  Associations:  no loose associations  Thought Content:  no evidence of suicidal ideation or homicidal ideation and no evidence of psychotic thought  Insight:  fair  Judgement:  intact  Oriented to:  time, person, and place  Attention Span and Concentration:  intact  Recent and Remote Memory:  intact         Precautions:     Behavioral Orders   Procedures     Code 1 - Restrict to Unit     Routine Programming     As clinically indicated     Self Injury Precaution     Status 15     Every 15 minutes.     Suicide precautions     Patients on Suicide Precautions should have a Combination Diet ordered that includes a Diet selection(s) AND a Behavioral Tray selection for Safe Tray - with utensils, or Safe Tray - NO utensils            Diagnoses:     1.  Bipolar disorder, current episode is mixed with possibly emerging chetan.   2.  Generalized  anxiety disorder.   3.  Alcohol use disorder in remission.   4.  Historical diagnosis of attention deficit hyperactivity disorder.   5.  Borderline personality disorder.   6.  Homelessness.   7.  Chronic pain with likely psychological factor.          Plan:     Medications:  1.  Depakote, start at 250 mg 3 times a day to address reported mood lability and anxiety. Obtain Depakote level, CBC and CMP on 10/21.  2.  Adderall was discontinued due to comorbid substance abuse and lack of indication.   3.  Gabapentin 100-300 mg t.i.d. p.r.n. for anxiety will be offered.   4.  Hydroxyzine for anxiety.   5.  Zyprexa for agitation and restlessness.   6.  Melatonin 5 mg at bedtime if needed for insomnia will be offered.     Legal Status and Disposition:  -- volunt  --  The patient will be granted discharge once established mood stabilization, remission of suicidal ideations and establishes safety in the community.  The patient is seeking referral to an outpatient psychiatrist for medication management.  He currently attends individual therapy and DBT at [facility] and plans to resume.  He plans to stay with his friend after discharge.

## 2019-10-17 NOTE — PROGRESS NOTES
"Patient states his big thing is his anxiety and his efforts to manage them have not been successful. Patient is hopeful that the medicine his doctor put him on will help. Gabapentin.. Patient is allert and oriented and approprite in conversation. Patient denies SI but does has occasional thoughts with no plan. He is isolative to room and did not attend groups. Denies depression and feels his anxiety his,\" through the roof\".  "

## 2019-10-17 NOTE — PROGRESS NOTES
"   10/17/19 1500   General Information   Date Initially Attended OT 10/17/19   Clinical Impression   Affect Appropriate to situation   Orientation Oriented to person, place and time   Appearance and ADLs General cleanliness observed in most areas   Attention to Internal Stimuli No observed signs   Interaction Skills Initiates appropriately with staff;Initiates appropriately with peers   Ability to Communicate Needs Independent   Verbal Content Clear;Appropriate to topic;Articulate   Ability to Maintain Boundaries Maintains appropriate physical boundaries;Maintains appropriate verbal boundaries   Participation Initiates participation   Concentration Concentrates 50 minutes   Ability to Concentrate Without difficulty   Follows and Comprehends Directions Independently follows multi-step directions   Memory Delayed and immediate recall intact;Needs further assessment   Organization Independently organizes all tasks;Needs further assessment   Decision Making Independent   Planning and Problem Solving Independently plans ahead   Ability to Apply and Learn Concepts Applies within group structure   Frustrations / Stress Tolerance Independently identifies sources of frustration/stress   Level of Insight Insightful into needs, issues, goals   Self Esteem Can identify positives   Social Supports Unable to identify any supports   General Observation/Plan   General Observations/Plan See Comments   Attended 2 of 2 OT groups. He worked on a familiar task requiring problem solving using visuospatial concepts and was successful with the process. He was pleasant and social on approach. He stated reason for admission as \"do not want to continue living\". He identified a personal strength of \"talking\". Changes he hopes for at time of discharge is \"working\". He stated his support is \"poor\". Pt was given and completed a written self assessment. OT purpose was explained with the value of having involvement in treatment plan, and provided " "options to meet self identified goals. He chose the OT goal focus to improve esteem, motivation, problem solving, organization, and finish what he starts. In the afternoon OT session he participated in an activity focused on identifying helpful ideas for calming using the 5 senses, and practicing a grounding technique with this focus. His explanations were creative, abstract as well as concrete and stated appreciation of the world around him with trying to focus on opportunities of \"growing\".  He stated wanting to be involved and gain what he can from this hospitalization. Plan: Will encourage attendance, provide opportunity for more creative and complex task work to provide a feeling of success and challenge as well as building motivation and organization. Encourage attendance and active involvement. Provide opportunity for exploring and expanding coping skills and signs of when to use them.         "

## 2019-10-17 NOTE — PROGRESS NOTES
Pt more involved this day. Attending groups. Social with peers. Present at meals and eating well. Pt denies feeling suicidal this afternoon.

## 2019-10-17 NOTE — PLAN OF CARE
PT: Checked back in with nurse on this date. No balance concerns identified. Will complete orders at this time.

## 2019-10-18 PROCEDURE — 25000132 ZZH RX MED GY IP 250 OP 250 PS 637: Performed by: PHYSICIAN ASSISTANT

## 2019-10-18 PROCEDURE — 25000132 ZZH RX MED GY IP 250 OP 250 PS 637: Performed by: PSYCHIATRY & NEUROLOGY

## 2019-10-18 PROCEDURE — 12400002 ZZH R&B MH SENIOR/ADOLESCENT

## 2019-10-18 RX ADMIN — ROSUVASTATIN CALCIUM 40 MG: 20 TABLET, FILM COATED ORAL at 20:16

## 2019-10-18 RX ADMIN — AMLODIPINE BESYLATE 10 MG: 10 TABLET ORAL at 08:19

## 2019-10-18 RX ADMIN — DIVALPROEX SODIUM 250 MG: 250 TABLET, DELAYED RELEASE ORAL at 20:16

## 2019-10-18 RX ADMIN — CLOPIDOGREL BISULFATE 75 MG: 75 TABLET, FILM COATED ORAL at 08:17

## 2019-10-18 RX ADMIN — DIVALPROEX SODIUM 250 MG: 250 TABLET, DELAYED RELEASE ORAL at 08:19

## 2019-10-18 RX ADMIN — ASPIRIN 81 MG: 81 TABLET ORAL at 08:20

## 2019-10-18 RX ADMIN — HYDROXYZINE HYDROCHLORIDE 25 MG: 25 TABLET, FILM COATED ORAL at 16:17

## 2019-10-18 RX ADMIN — HYDROXYZINE HYDROCHLORIDE 25 MG: 25 TABLET, FILM COATED ORAL at 20:16

## 2019-10-18 RX ADMIN — METOPROLOL SUCCINATE 50 MG: 50 TABLET, EXTENDED RELEASE ORAL at 08:19

## 2019-10-18 RX ADMIN — HYDROXYZINE HYDROCHLORIDE 25 MG: 25 TABLET, FILM COATED ORAL at 08:18

## 2019-10-18 RX ADMIN — HYDROXYZINE HYDROCHLORIDE 25 MG: 25 TABLET, FILM COATED ORAL at 12:50

## 2019-10-18 RX ADMIN — LISINOPRIL 10 MG: 2.5 TABLET ORAL at 08:18

## 2019-10-18 RX ADMIN — DIVALPROEX SODIUM 250 MG: 250 TABLET, DELAYED RELEASE ORAL at 14:16

## 2019-10-18 RX ADMIN — EZETIMIBE 10 MG: 10 TABLET ORAL at 08:20

## 2019-10-18 ASSESSMENT — ACTIVITIES OF DAILY LIVING (ADL)
LAUNDRY: WITH SUPERVISION
ORAL_HYGIENE: INDEPENDENT
DRESS: INDEPENDENT
ORAL_HYGIENE: INDEPENDENT
DRESS: INDEPENDENT
HYGIENE/GROOMING: INDEPENDENT
DRESS: INDEPENDENT
HYGIENE/GROOMING: INDEPENDENT
LAUNDRY: WITH SUPERVISION
ORAL_HYGIENE: INDEPENDENT
HYGIENE/GROOMING: INDEPENDENT

## 2019-10-18 NOTE — DISCHARGE INSTRUCTIONS
Behavioral Discharge Planning and Instructions      Summary:  You were admitted on 10/15/2019  due to Bipolar II Disorder .  You were treated by Dr. Francisco Baig MD and discharged on 10/21/2019 from [facility] to friend.    Na Lovell  [phone]      Principal Diagnosis:   1.  Bipolar disorder, current episode is mixed with possibly emerging chetan.   2.  Generalized anxiety disorder.   3.  Alcohol use disorder in remission.   4.  Historical diagnosis of attention deficit hyperactivity disorder.   5.  Borderline personality disorder.   6.  Homelessness.   7.  Chronic pain with likely psychological factor.     Health Care Follow-up Appointments:   PCP:  Dr. Cosmo Lawson - Wednesday, October 23rd at 2:45pm  [address]  [address],   [place] [address]   Phone: [phone]    Psychiatrist:  Ronit Jaramillo - Friday, December 27th at 3pm.  Please bring photo id and insurance card.  Please arrive 45 minutes prior to appt to complete paperwork.  Allow 24 hours for cancellation.  [facility] -   [facility] [place] [address]   Phone: [phone]    Attend all scheduled appointments with your outpatient providers. Call at least 24 hours in advance if you need to reschedule an appointment to ensure continued access to your outpatient providers.   Major Treatments, Procedures and Findings:  You were provided with: a psychiatric assessment and assessed for medical stability    Symptoms to Report: feeling more aggressive, increased confusion, losing more sleep, mood getting worse or thoughts of suicide    Early warning signs can include: increased depression or anxiety sleep disturbances increased thoughts or behaviors of suicide or self-harm  increased unusual thinking, such as paranoia or hearing voices    Safety and Wellness:  Take all medicines as directed.  Make no changes unless your doctor suggests them.      Follow treatment recommendations.  Refrain from alcohol and non-prescribed drugs.  If  there is a concern for safety, call 911.    Resources:   Crisis Intervention: [phone] or 363-932-6229 (TTY: 754.932.5182).  Call anytime for help.  National [place] on Mental Illness (www.[place].krupa.org): 507.340.6264 or 613-528-6949.  Suicide Awareness Voices of Education (SAVE) (www.save.org): 382-730-MXST (4252)  National Suicide Prevention Line (www.mentalhealthmn.org): 310-511-RRGM (4789)  Mental Health Consumer/Survivor Network of [place] (www.mhcsn.net): 827.902.9730 or 451-875-5680  Mental Health Association of [place] (www.mentalhealth.org): 314.206.9704 or 643-684-4918  Self- Management and Recovery Training., SMART-- Toll free: 868.402.1439  www.[employer].org  [facility] Mobile Crisis Response Team (CRT):  [phone] or [phone]       The treatment team has appreciated the opportunity to work with you.     If you have any questions or concerns our unit number is [phone].

## 2019-10-18 NOTE — PROGRESS NOTES
Pt was observed present in the milieu attending group, socializing with peers and eating meal. Pt appeared with full range affect and calm mood. Pt was very polite and respectful during the check-in. Clear and coherent speech suggesting intact insight and judgement. Pt said he was happy that he seek help and was very satisfied with the hospital so far. Pt reported feeling anxious earlier today but felt much better after taking his medication. Pt was surprised by the fact that he did not have any suicidal thoughts for the whole evening and night, attributed to the medication. Pt denied SI, SIB and HI.          10/17/19 2000   Behavioral Health   Hallucinations denies / not responding to hallucinations   Thinking intact   Orientation person: oriented;place: oriented;date: oriented;time: oriented   Memory baseline memory   Insight insight appropriate to situation;insight appropriate to events   Judgement intact   Eye Contact at examiner   Affect full range affect   Mood mood is calm   Physical Appearance/Attire attire appropriate to age and situation   Hygiene well groomed   1. Wish to be Dead (Past Month) No   2. Non-Specific Active Suicidal Thoughts (Past Month) No   Elopement   (None observed or stated)   Activity   (normal)   Speech clear;coherent   Medication Sensitivity no stated side effects;no observed side effects   Psychomotor / Gait balanced;steady   Psycho Education   Type of Intervention 1:1 intervention   Response participates, initiates socially appropriate   Hours 0.5   Treatment Detail check in   Activities of Daily Living   Hygiene/Grooming independent   Oral Hygiene independent   Dress scrubs (behavioral health)   Room Organization independent

## 2019-10-18 NOTE — PROGRESS NOTES
Pt stated he feels his mental health is improving and feels well. Pt stated he has feelings of depression (4/10) and some anxiety. Pt stated when he is feeling anxious his PRN helps decrease his anxiety. Pt expressed future orientated thinking with plans after discharge. Pt stated he feels hopefull though somewhat apprehensive about the possibility of the lack of follow through and loss of hope. Pt active in milieu. Pt denied SI/SIB and hallucinations.       10/18/19 2537   Behavioral Health   Hallucinations denies / not responding to hallucinations   Thinking intact   Orientation person: oriented;place: oriented;date: oriented;time: oriented   Memory baseline memory   Insight admits / accepts   Judgement intact   Eye Contact at examiner   Affect full range affect   Mood mood is calm;depressed;anxious   Physical Appearance/Attire attire appropriate to age and situation   Hygiene well groomed   Suicidality other (see comments)  (Denies)   1. Wish to be Dead (Past Month) No   2. Non-Specific Active Suicidal Thoughts (Past Month) No   Self Injury other (see comment)  (Denies)   Elopement   (None)   Activity other (see comment)  (Active in milieu)   Speech coherent;clear   Medication Sensitivity no stated side effects;no observed side effects   Psychomotor / Gait steady;balanced   Psycho Education   Type of Intervention 1:1 intervention   Response participates, initiates socially appropriate   Hours 0.5   Treatment Detail Check-in   Safety   Suicidality Status 15   Activities of Daily Living   Hygiene/Grooming independent   Oral Hygiene independent   Dress independent   Laundry with supervision   Room Organization independent   Activity   Activity Assistance Provided independent

## 2019-10-19 PROCEDURE — 25000132 ZZH RX MED GY IP 250 OP 250 PS 637: Performed by: PHYSICIAN ASSISTANT

## 2019-10-19 PROCEDURE — 25000132 ZZH RX MED GY IP 250 OP 250 PS 637: Performed by: PSYCHIATRY & NEUROLOGY

## 2019-10-19 PROCEDURE — 90853 GROUP PSYCHOTHERAPY: CPT

## 2019-10-19 PROCEDURE — 12400002 ZZH R&B MH SENIOR/ADOLESCENT

## 2019-10-19 RX ORDER — CLONIDINE HYDROCHLORIDE 0.1 MG/1
0.1 TABLET ORAL 3 TIMES DAILY PRN
Status: DISCONTINUED | OUTPATIENT
Start: 2019-10-19 | End: 2019-10-21 | Stop reason: HOSPADM

## 2019-10-19 RX ADMIN — HYDROXYZINE HYDROCHLORIDE 25 MG: 25 TABLET, FILM COATED ORAL at 08:17

## 2019-10-19 RX ADMIN — ASPIRIN 81 MG: 81 TABLET ORAL at 08:18

## 2019-10-19 RX ADMIN — CLONIDINE HYDROCHLORIDE 0.1 MG: 0.1 TABLET ORAL at 21:06

## 2019-10-19 RX ADMIN — ROSUVASTATIN CALCIUM 40 MG: 20 TABLET, FILM COATED ORAL at 21:05

## 2019-10-19 RX ADMIN — LISINOPRIL 10 MG: 2.5 TABLET ORAL at 08:17

## 2019-10-19 RX ADMIN — DIVALPROEX SODIUM 250 MG: 250 TABLET, DELAYED RELEASE ORAL at 08:17

## 2019-10-19 RX ADMIN — AMLODIPINE BESYLATE 10 MG: 10 TABLET ORAL at 08:17

## 2019-10-19 RX ADMIN — CLOPIDOGREL BISULFATE 75 MG: 75 TABLET, FILM COATED ORAL at 08:17

## 2019-10-19 RX ADMIN — DIVALPROEX SODIUM 250 MG: 250 TABLET, DELAYED RELEASE ORAL at 13:51

## 2019-10-19 RX ADMIN — ACETAMINOPHEN 650 MG: 325 TABLET, FILM COATED ORAL at 08:17

## 2019-10-19 RX ADMIN — EZETIMIBE 10 MG: 10 TABLET ORAL at 08:17

## 2019-10-19 RX ADMIN — CLONIDINE HYDROCHLORIDE 0.1 MG: 0.1 TABLET ORAL at 16:37

## 2019-10-19 RX ADMIN — METOPROLOL SUCCINATE 50 MG: 50 TABLET, EXTENDED RELEASE ORAL at 08:17

## 2019-10-19 RX ADMIN — DIVALPROEX SODIUM 250 MG: 250 TABLET, DELAYED RELEASE ORAL at 21:05

## 2019-10-19 RX ADMIN — HYDROXYZINE HYDROCHLORIDE 25 MG: 25 TABLET, FILM COATED ORAL at 12:25

## 2019-10-19 ASSESSMENT — ACTIVITIES OF DAILY LIVING (ADL)
HYGIENE/GROOMING: INDEPENDENT
LAUNDRY: WITH SUPERVISION
HYGIENE/GROOMING: INDEPENDENT
DRESS: INDEPENDENT
LAUNDRY: WITH SUPERVISION
DRESS: INDEPENDENT
ORAL_HYGIENE: INDEPENDENT
LAUNDRY: WITH SUPERVISION
ORAL_HYGIENE: INDEPENDENT
ORAL_HYGIENE: INDEPENDENT
DRESS: INDEPENDENT
HYGIENE/GROOMING: INDEPENDENT

## 2019-10-19 NOTE — PLAN OF CARE
"  Problem: Adult Inpatient Plan of Care  Goal: Readiness for Transition of Care  Outcome: Improving     Problem: Suicide Risk  Goal: Absence of Self-Harm  Outcome: Improving     Problem: Depressive Symptoms  Goal: Depressive Symptoms  Description  Signs and symptoms of listed problems will be absent or manageable.  1. Decrease in suicidal thoughts or pt will confess suicidal thoughts and feelings.  2. Pt will respond to tx plans to reduce risk for suicidal behaviors  3 Pt will uncover desires to harm himself and promptly  reports to staff.  4 Pt will  express  purpose in life and pleasure in doing things.  5 Pt will be compliant with his medications and treatment plans  6 Pt will sleep at least 7 hours/ night  7 Pt will actively participate in unit activities and programming         Outcome: Improving    Pt out in lounge until after dinner.  Slept until 8 pm and came out for meds and stayed out to watch TV.  He was observed to be socializing and laughing.  He states his anxiety is \"managed\"  Requesting hydroxyzine 2x this shift stating it helps control the anxiety.     "

## 2019-10-19 NOTE — PROGRESS NOTES
Pt requested and received prn vistaril 25 mg at 1225. Pt reported that he was feeling panic. Pt reported that vistaril is not helping as much as it was the first couple of times he had taken int. Pt received 2 doses on Thursday, 4 doses on Friday and 2 doses so far today. Pt does admit that he feels better than on admission.   Pt has been declining to take prn gabapentin as he has been on this previously in high doses for pain. Pt reports that he did not have any relief from anxiety on high doses.     Dr. Baig was consulted and order received for prn clonidine 0.1 mg tid  for anxiety.

## 2019-10-19 NOTE — PLAN OF CARE
"Pt presents with full range affect and anxious mood. States that his anxiety is 4/10 and depression is 4/10. During the day pt was present in milieu and social with peers. Pt did spend some of the day in his room sleeping. When asked about how he was doing he states that he is \"okay\". Denies SI/SIB. Pt has been averaging 10.75 hours of sleep a night. No other requests at this time. Pt did eat both breakfast and lunch.      "

## 2019-10-20 PROCEDURE — H2032 ACTIVITY THERAPY, PER 15 MIN: HCPCS

## 2019-10-20 PROCEDURE — 25000132 ZZH RX MED GY IP 250 OP 250 PS 637: Performed by: PSYCHIATRY & NEUROLOGY

## 2019-10-20 PROCEDURE — 25000132 ZZH RX MED GY IP 250 OP 250 PS 637: Performed by: PHYSICIAN ASSISTANT

## 2019-10-20 PROCEDURE — 12400002 ZZH R&B MH SENIOR/ADOLESCENT

## 2019-10-20 RX ADMIN — EZETIMIBE 10 MG: 10 TABLET ORAL at 08:16

## 2019-10-20 RX ADMIN — ACETAMINOPHEN 650 MG: 325 TABLET, FILM COATED ORAL at 18:46

## 2019-10-20 RX ADMIN — METOPROLOL SUCCINATE 50 MG: 50 TABLET, EXTENDED RELEASE ORAL at 08:15

## 2019-10-20 RX ADMIN — DIVALPROEX SODIUM 250 MG: 250 TABLET, DELAYED RELEASE ORAL at 13:43

## 2019-10-20 RX ADMIN — AMLODIPINE BESYLATE 10 MG: 10 TABLET ORAL at 08:15

## 2019-10-20 RX ADMIN — DIVALPROEX SODIUM 250 MG: 250 TABLET, DELAYED RELEASE ORAL at 08:15

## 2019-10-20 RX ADMIN — ASPIRIN 81 MG: 81 TABLET ORAL at 08:16

## 2019-10-20 RX ADMIN — ROSUVASTATIN CALCIUM 40 MG: 20 TABLET, FILM COATED ORAL at 21:10

## 2019-10-20 RX ADMIN — ACETAMINOPHEN 650 MG: 325 TABLET, FILM COATED ORAL at 11:27

## 2019-10-20 RX ADMIN — CLONIDINE HYDROCHLORIDE 0.1 MG: 0.1 TABLET ORAL at 21:10

## 2019-10-20 RX ADMIN — DIVALPROEX SODIUM 250 MG: 250 TABLET, DELAYED RELEASE ORAL at 21:10

## 2019-10-20 RX ADMIN — HYDROXYZINE HYDROCHLORIDE 25 MG: 25 TABLET, FILM COATED ORAL at 18:46

## 2019-10-20 RX ADMIN — CLONIDINE HYDROCHLORIDE 0.1 MG: 0.1 TABLET ORAL at 11:27

## 2019-10-20 RX ADMIN — CLOPIDOGREL BISULFATE 75 MG: 75 TABLET, FILM COATED ORAL at 08:15

## 2019-10-20 RX ADMIN — LISINOPRIL 10 MG: 2.5 TABLET ORAL at 08:15

## 2019-10-20 ASSESSMENT — ACTIVITIES OF DAILY LIVING (ADL)
ORAL_HYGIENE: INDEPENDENT
DRESS: SCRUBS (BEHAVIORAL HEALTH);INDEPENDENT
ORAL_HYGIENE: INDEPENDENT
HYGIENE/GROOMING: INDEPENDENT
LAUNDRY: WITH SUPERVISION
HYGIENE/GROOMING: INDEPENDENT
LAUNDRY: WITH SUPERVISION
DRESS: INDEPENDENT

## 2019-10-20 ASSESSMENT — MIFFLIN-ST. JEOR: SCORE: 1779.8

## 2019-10-20 NOTE — PLAN OF CARE
"  Problem: Depressive Symptoms  Goal: Depressive Symptoms  Description  Signs and symptoms of listed problems will be absent or manageable.  1. Decrease in suicidal thoughts or pt will confess suicidal thoughts and feelings.  2. Pt will respond to tx plans to reduce risk for sucidal behaviors  3 Pt will uncover desires to harm himself and promptly  reports to staff.  4 Pt will  express  purpose in life and pleasure in doing things.  5 Pt will be compliant with his medications and treatment plans  6 Pt will sleep at least 7 hours/ night  7 Pt will actively participate in unit activities and programming         10/19/2019 2047 by Katherin Sol, RN  Outcome: Improving  10/19/2019 1315 by Madison Noland, RN  Outcome: No Change  Goal: Social and Therapeutic (Depression)  Description  Signs and symptoms of listed problems will be absent or manageable.  Outcome: Improving     Problem: Suicidal Behavior  Goal: Suicidal Behavior is Absent or Managed  Outcome: Improving    Pleasant and cooperative.  Still some anxiety that was relieved with the clonidine stating \"I feel calmer, so it must be helping\".  Went to group.  Out in lounge watching TV. Denies depression.     "

## 2019-10-20 NOTE — PLAN OF CARE
"Pt presents with full range affect and calm mood. This morning pt reported his anxiety 2/10 and depression 2/10. Pt denied SI/SIB. Later in the morning pt c/o some anxiety and was given PRN clonidine and pt reports that this was effective. Pt reported his overall mood as \"fair to good\" which was improved since yesterday as he reported he just felt okay. Pt ate breakfast and lunch. No other requests or complaints at this time.   "

## 2019-10-20 NOTE — PROGRESS NOTES
Pt was pleasant and active in groups and check-in. Pt was full range affect, and their mood was calm. Pt appeared bright and optimistic about their future. Pt rated their anxiety at a 3/10 and their depression at 4/10. Pt was insightful into their own illness and willing/hopeful about treatment and planning for their future care. Pt admitted to invasive thoughts about self injury, but denied wanting to act on them. Denied suicidality, denied hallucinating.        10/19/19 2200   Behavioral Health   Hallucinations denies / not responding to hallucinations   Thinking intact   Orientation person: oriented;place: oriented;date: oriented;time: oriented   Memory baseline memory   Insight admits / accepts;insight appropriate to situation;insight appropriate to events   Judgement intact   Affect full range affect   Mood mood is calm   Physical Appearance/Attire attire appropriate to age and situation   Hygiene well groomed   Suicidality other (see comments);thoughts only  (Denies)   1. Wish to be Dead (Past Month) No   2. Non-Specific Active Suicidal Thoughts (Past Month) No   Self Injury thoughts only   Elopement   (none)   Activity other (see comment)  (active in groups and milieu)   Speech clear;coherent   Medication Sensitivity no stated side effects;no observed side effects   Psychomotor / Gait balanced;steady   Psycho Education   Type of Intervention 1:1 intervention   Response participates, initiates socially appropriate   Hours 0.5   Treatment Detail   (Check-In)   Safety   Suicidality Status 15   Activities of Daily Living   Hygiene/Grooming independent   Oral Hygiene independent   Dress independent   Laundry with supervision   Room Organization independent   Activity   Activity Assistance Provided independent

## 2019-10-20 NOTE — PROGRESS NOTES
10/19/19 1900   Group Therapy Session   Group Attendance attended group session   Total Time (minutes) 50   Group Type psychotherapeutic   Group Topic Covered other (see comments)   Patient Participation/Contribution cooperative with task;discussed personal experience with topic;expressed understanding of topic;listened actively;offered helpful suggestions to peers   Patient participated in psychotherapy group which included a mindfulness activity focusing on the 5 senses and then processing as a group.  Cecil presented as pleasant with congruent affect. He was actively engaged in the activity.  He demonstrated insight and was articulate in processing his thoughts and feelings with the group.

## 2019-10-21 VITALS
DIASTOLIC BLOOD PRESSURE: 83 MMHG | RESPIRATION RATE: 16 BRPM | TEMPERATURE: 97.9 F | HEIGHT: 69 IN | SYSTOLIC BLOOD PRESSURE: 118 MMHG | HEART RATE: 84 BPM | WEIGHT: 204.9 LBS | BODY MASS INDEX: 30.35 KG/M2 | OXYGEN SATURATION: 96 %

## 2019-10-21 LAB
ALBUMIN SERPL-MCNC: 3.5 G/DL (ref 3.4–5)
ALP SERPL-CCNC: 59 U/L (ref 40–150)
ALT SERPL W P-5'-P-CCNC: 31 U/L (ref 0–70)
ANION GAP SERPL CALCULATED.3IONS-SCNC: 7 MMOL/L (ref 3–14)
AST SERPL W P-5'-P-CCNC: 14 U/L (ref 0–45)
BASOPHILS # BLD AUTO: 0 10E9/L (ref 0–0.2)
BASOPHILS NFR BLD AUTO: 0.4 %
BILIRUB SERPL-MCNC: 0.4 MG/DL (ref 0.2–1.3)
BUN SERPL-MCNC: 20 MG/DL (ref 7–30)
CALCIUM SERPL-MCNC: 8.8 MG/DL (ref 8.5–10.1)
CHLORIDE SERPL-SCNC: 105 MMOL/L (ref 94–109)
CO2 SERPL-SCNC: 26 MMOL/L (ref 20–32)
CREAT SERPL-MCNC: 0.76 MG/DL (ref 0.66–1.25)
DIFFERENTIAL METHOD BLD: NORMAL
EOSINOPHIL # BLD AUTO: 0.4 10E9/L (ref 0–0.7)
EOSINOPHIL NFR BLD AUTO: 5 %
ERYTHROCYTE [DISTWIDTH] IN BLOOD BY AUTOMATED COUNT: 12.3 % (ref 10–15)
GFR SERPL CREATININE-BSD FRML MDRD: >90 ML/MIN/{1.73_M2}
GLUCOSE SERPL-MCNC: 94 MG/DL (ref 70–99)
HCT VFR BLD AUTO: 42 % (ref 40–53)
HGB BLD-MCNC: 13.9 G/DL (ref 13.3–17.7)
IMM GRANULOCYTES # BLD: 0.3 10E9/L (ref 0–0.4)
IMM GRANULOCYTES NFR BLD: 3.6 %
LYMPHOCYTES # BLD AUTO: 2.1 10E9/L (ref 0.8–5.3)
LYMPHOCYTES NFR BLD AUTO: 28.8 %
MCH RBC QN AUTO: 29.3 PG (ref 26.5–33)
MCHC RBC AUTO-ENTMCNC: 33.1 G/DL (ref 31.5–36.5)
MCV RBC AUTO: 89 FL (ref 78–100)
MONOCYTES # BLD AUTO: 0.3 10E9/L (ref 0–1.3)
MONOCYTES NFR BLD AUTO: 4.5 %
NEUTROPHILS # BLD AUTO: 4.2 10E9/L (ref 1.6–8.3)
NEUTROPHILS NFR BLD AUTO: 57.7 %
NRBC # BLD AUTO: 0 10*3/UL
NRBC BLD AUTO-RTO: 0 /100
PLATELET # BLD AUTO: 200 10E9/L (ref 150–450)
POTASSIUM SERPL-SCNC: 4.6 MMOL/L (ref 3.4–5.3)
PROT SERPL-MCNC: 6.7 G/DL (ref 6.8–8.8)
RBC # BLD AUTO: 4.74 10E12/L (ref 4.4–5.9)
SODIUM SERPL-SCNC: 138 MMOL/L (ref 133–144)
VALPROATE SERPL-MCNC: 72 MG/L (ref 50–100)
WBC # BLD AUTO: 7.3 10E9/L (ref 4–11)

## 2019-10-21 PROCEDURE — 36415 COLL VENOUS BLD VENIPUNCTURE: CPT | Performed by: PSYCHIATRY & NEUROLOGY

## 2019-10-21 PROCEDURE — 80053 COMPREHEN METABOLIC PANEL: CPT | Performed by: PSYCHIATRY & NEUROLOGY

## 2019-10-21 PROCEDURE — 25000132 ZZH RX MED GY IP 250 OP 250 PS 637: Performed by: PSYCHIATRY & NEUROLOGY

## 2019-10-21 PROCEDURE — 80164 ASSAY DIPROPYLACETIC ACD TOT: CPT | Performed by: PSYCHIATRY & NEUROLOGY

## 2019-10-21 PROCEDURE — 25000132 ZZH RX MED GY IP 250 OP 250 PS 637: Performed by: PHYSICIAN ASSISTANT

## 2019-10-21 PROCEDURE — 85025 COMPLETE CBC W/AUTO DIFF WBC: CPT | Performed by: PSYCHIATRY & NEUROLOGY

## 2019-10-21 RX ORDER — ALBUTEROL SULFATE 90 UG/1
2 AEROSOL, METERED RESPIRATORY (INHALATION) EVERY 6 HOURS PRN
Qty: 1 INHALER | Refills: 0
Start: 2019-10-21 | End: 2019-11-20

## 2019-10-21 RX ORDER — METOPROLOL SUCCINATE 50 MG/1
50 TABLET, EXTENDED RELEASE ORAL DAILY
Qty: 30 TABLET | Refills: 0
Start: 2019-10-22 | End: 2019-11-21

## 2019-10-21 RX ORDER — LISINOPRIL 10 MG/1
10 TABLET ORAL DAILY
Qty: 30 TABLET | Refills: 0
Start: 2019-10-22 | End: 2019-11-21

## 2019-10-21 RX ORDER — HYDROXYZINE HYDROCHLORIDE 25 MG/1
25 TABLET, FILM COATED ORAL EVERY 4 HOURS PRN
Qty: 30 TABLET | Refills: 0
Start: 2019-10-21 | End: 2019-11-20

## 2019-10-21 RX ORDER — ERGOCALCIFEROL 1.25 MG/1
50000 CAPSULE, LIQUID FILLED ORAL
Qty: 4 CAPSULE | Refills: 0
Start: 2019-10-23 | End: 2019-11-22

## 2019-10-21 RX ORDER — CLONIDINE HYDROCHLORIDE 0.1 MG/1
0.1 TABLET ORAL 3 TIMES DAILY PRN
Qty: 30 TABLET | Refills: 0
Start: 2019-10-21 | End: 2019-11-20

## 2019-10-21 RX ORDER — AMLODIPINE BESYLATE 10 MG/1
10 TABLET ORAL DAILY
Qty: 30 TABLET | Refills: 0
Start: 2019-10-22 | End: 2019-11-21

## 2019-10-21 RX ORDER — DIVALPROEX SODIUM 250 MG/1
250 TABLET, DELAYED RELEASE ORAL 3 TIMES DAILY
Qty: 90 TABLET | Refills: 0
Start: 2019-10-21 | End: 2019-11-20

## 2019-10-21 RX ADMIN — LISINOPRIL 10 MG: 2.5 TABLET ORAL at 08:41

## 2019-10-21 RX ADMIN — CLOPIDOGREL BISULFATE 75 MG: 75 TABLET, FILM COATED ORAL at 08:42

## 2019-10-21 RX ADMIN — ASPIRIN 81 MG: 81 TABLET ORAL at 08:41

## 2019-10-21 RX ADMIN — HYDROXYZINE HYDROCHLORIDE 25 MG: 25 TABLET, FILM COATED ORAL at 08:46

## 2019-10-21 RX ADMIN — METOPROLOL SUCCINATE 50 MG: 50 TABLET, EXTENDED RELEASE ORAL at 08:42

## 2019-10-21 RX ADMIN — EZETIMIBE 10 MG: 10 TABLET ORAL at 08:42

## 2019-10-21 RX ADMIN — AMLODIPINE BESYLATE 10 MG: 10 TABLET ORAL at 08:42

## 2019-10-21 RX ADMIN — DIVALPROEX SODIUM 250 MG: 250 TABLET, DELAYED RELEASE ORAL at 08:42

## 2019-10-21 ASSESSMENT — ACTIVITIES OF DAILY LIVING (ADL)
DRESS: INDEPENDENT;STREET CLOTHES
HYGIENE/GROOMING: INDEPENDENT
ORAL_HYGIENE: INDEPENDENT
DRESS: INDEPENDENT;STREET CLOTHES
ORAL_HYGIENE: INDEPENDENT
HYGIENE/GROOMING: INDEPENDENT

## 2019-10-21 NOTE — PLAN OF CARE
Problem: Suicide Risk  Goal: Absence of Self-Harm  Outcome: Improving     Problem: Depressive Symptoms  Goal: Depressive Symptoms  Description  Signs and symptoms of listed problems will be absent or manageable.  1. Decrease in suicidal thoughts or pt will confess suicidal thoughts and feelings.  2. Pt will respond to tx plans to reduce risk for sucidal behaviors  3 Pt will uncover desires to harm himself and promptly  reports to staff.  4 Pt will  express  purpose in life and pleasure in doing things.  5 Pt will be compliant with his medications and treatment plans  6 Pt will sleep at least 7 hours/ night  7 Pt will actively participate in unit activities and programming         10/20/2019 2244 by Katherin Sol, RN  Outcome: Improving  10/20/2019 1326 by Madison Noland, RN  Outcome: Improving  Goal: Social and Therapeutic (Depression)  Description  Signs and symptoms of listed problems will be absent or manageable.  Outcome: Improving  Pt is doing well.  Social on the unit.  Low anxiety and depression.

## 2019-10-21 NOTE — PROGRESS NOTES
"   10/20/19 2200   General Information   Art Directive other (see comments)   AT directive is to create a mindfulness focused finger labyrinth using colored glue and other art materials of pts choice. Goals of directive: to create a mindfulness tool, practice a mindfulness meditation, emotional regulation.  Pt was a positive participant, focused on task for the full duration of group. Pt shared at the end of group that he threw his labyrinth away. Pt said he currently feels that he is in a labyrinth and referred to feeling stuck in his current situation. Pt went on to say that he was studying philosophy prior to admit and that the subject of his reading made him feel more hopeless and depressed. Pt responded to another pt who said she had to start a labyrinth all over because she made a mistake. This pt said, \"its so simple, I just need to start over again.\" Pt referred then to feeling hopeful for his future.  Pts mood was calm.  "

## 2019-10-21 NOTE — DISCHARGE SUMMARY
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              Discharge Plan:     Health Care Follow-up Appointments:   PCP:  Dr. Cosmo Lawson - Wednesday, October 23rd at 2:45pm  [address]  [address],   [place] [address]   Phone: [phone]     Psychiatrist:  Ronit Jaramillo - Friday, December 27th at 3pm.  Please bring photo id and insurance card.  Please arrive 45 minutes prior to appt to complete paperwork.  Allow 24 hours for cancellation.  [facility] -   [facility] [place] [address]   Phone: [phone]    Resources:   Crisis Intervention: 213.303.6752 or 124-216-7527 (TTY: 605.177.6998).  Call anytime for help.  National [place] on Mental Illness (www.[place].krupa.org): 787.162.3059 or 420-363-1094.  Suicide Awareness Voices of Education (SAVE) (www.save.org): 641-896-VBAA (8023)  National Suicide Prevention Line (www.mentalhealthmn.org): 414-272-EPTA (4746)  Mental Health Consumer/Survivor Network of [place] (www.mhcsn.net): 323.231.2964 or 699-317-6723  Mental Health Association of [place] (www.mentalhealth.org): 983.728.1588 or 280-025-1292  Self- Management and Recovery Training., SMART-- Toll free: 119.961.6289  www.SPOOTNIC.COMrecovery.org  [facility], and [facility] Mobile Crisis Response Team (CRT):  [phone] or [phone]      Attestation:  The patient has been seen and evaluated by me,  Francisco Baig MD

## 2019-10-21 NOTE — PROGRESS NOTES
Pt was calm and pleasant during group and check -in, cracking jokes and conversing amicably with other patients and staff, including author. Pt endorsed feeling a little anxious about leaving tomorrow, but feels hopeful. Pt said they were not feeling suicidal, self injurious, denied hallucinations, and did not wish to be dead. Pt was very similar to their condition yesterday. Nothing further to report.       10/20/19 2118   Behavioral Health   Hallucinations denies / not responding to hallucinations   Thinking intact   Orientation person: oriented;place: oriented;date: oriented;time: oriented   Memory baseline memory   Insight admits / accepts   Judgement intact   Affect full range affect   Mood mood is calm   Physical Appearance/Attire attire appropriate to age and situation   Hygiene other (see comment)  (fine)   Suicidality other (see comments)  (denies)   1. Wish to be Dead (Past Month) No   2. Non-Specific Active Suicidal Thoughts (Past Month) No   Self Injury other (see comment)  (denies)   Elopement   (none)   Activity other (see comment)  (active in milieu)   Speech clear;coherent   Medication Sensitivity no observed side effects;no stated side effects   Psychomotor / Gait balanced;steady   Psycho Education   Type of Intervention 1:1 intervention   Response participates, initiates socially appropriate   Hours 0.5   Treatment Detail Check-In   Safety   Suicidality Status 15   Activities of Daily Living   Hygiene/Grooming independent   Oral Hygiene independent   Dress independent   Laundry with supervision   Room Organization independent   Activity   Activity Assistance Provided independent

## 2019-10-21 NOTE — PROGRESS NOTES
"Pt is reporting that he feels ready for discharge. Pt rated anxiety at 7/10 and depression at 4/10. Pt denies SI/SIB. Pt is reporting that he has a 1 PM interview in [place] at the [facility].  Pt reports that he is ready to go on to \"the next step\". Pt reports that the [facility] has a bed opening and is a residential facility that assists with MI/CD issues.   Signed AVS was faxed to pateint's PCP Dr. Lawson at [facility] who will prescribe patients medications. Per patient's request -note on cover sheet included message to have medications sent to [facility]. Pt will pick these up around 5 PM tonight.   Original signed AVS is being sent with patient to give to Dr. Lawson on Wednesday during his appointment. Copy made and kept on unit.   AVS with medication schedule was reviewed with patient. He is aware of follow up appointments.   Pt's friend will be picking patient up around 1200.   "

## 2019-10-22 ENCOUNTER — TELEPHONE (OUTPATIENT)
Dept: FAMILY MEDICINE | Facility: CLINIC | Age: 49
End: 2019-10-22

## 2019-10-22 NOTE — TELEPHONE ENCOUNTER
ED / Discharge Outreach Protocol    Patient Contact    Attempt # 1    Was call answered?  No.  Unable to leave message.    Dominga Herrera RN

## 2019-10-22 NOTE — TELEPHONE ENCOUNTER
Patient called to schedule an appointment for a hospital follow-up or appeared on a report showing that they were recently discharged from the hospital.    Patient was admitted to [place]:    Discharged date: 10/21/19  Reason for hospital admission:  Current Severe Episode Of Major Depressive Disorder Without Psychotic Features  Does patient have future appointment scheduled with provider? No  Date of future appointment:        This information will be used to help the care team plan for the patients upcoming visit.  The triage RN may determine that a follow up call is necessary and reach out to the patient via the phone number listed in the chart.     Please route this message on routine priority to the Triage RN pool.

## 2019-10-23 NOTE — TELEPHONE ENCOUNTER
ED / Discharge Outreach Protocol    Patient Contact    Attempt # 2    Was call answered?  No.  Unable to leave message.    Dominga Herrera RN

## 2019-10-24 NOTE — TELEPHONE ENCOUNTER
ED / Discharge Outreach Protocol    Patient Contact    Attempt # 3    Was call answered?  No.  Unable to leave message.    Dominga Herrera RN

## 2021-06-02 NOTE — PROGRESS NOTES
ANTICOAGULATION  MANAGEMENT- Travel planning    Trenton Nova reports upcoming travel plans:    Departure date: 10/24/19  Anticipated return date: December 6th  Phone number patient can be reached at: [phone]      INR monitoring plan:     [facility] to monitor and dose while traveling: Yes     INR standing order faxed/mailed: Yes     If no, provider name and phone number to manage warfarin: N/A.          Instructed to call the [facility] for any changes, questions or concerns. (#[phone])   ?   Nieves Danielle RN         PRIMARY CARDIOLOGIST:  Amish Wallace MD      REASON FOR VISIT:  Coronary artery disease, drug-eluting stent followup.      HISTORY OF PRESENT ILLNESS:  Mr. Vasquez is a delightful 46-year-old gentleman who had past medical history significant for asthma, untreated hypertension, untreated dyslipidemia, mild persistent asthma and trigeminal neuralgia treated with pain medications, who had developed multiple episodes of substernal chest pain and presented to the ER on 3 occasions in early February.  He eventually was sent for a treadmill stress test that had EKG changes and was associated with chest pain and then went on to have a positive stress echo.  He was seen by Dr. Wallace and sent to [facility] for angiogram.  There he was found to have a 99% proximal LAD lesion and underwent drug-eluting stent.  He was initially started on Brilinta, but due to insurance cost this was switched over to Effient.  He has a remaining 60% RCA lesion in the distal portion.  The remainder of the coronary tree has less disease.  He tells me that his pain is completely gone.  In his chest he has not had any additional symptoms of angina.  He has bilateral leg pain that was resolved for the first week after but is now back.  This is on a pain management program he is compliant with with his primary.  His blood pressure has been good.  He is taking his medications without difficulty.  He is bruising, but no significant bleeding, no hematuria or blood in his stools.      SOCIAL HISTORY:  He works driving a forklift.  He is back to work.  He tells me that about 2 years ago he completely changed his life, is working on being a more loving and kind person and having a healthier lifestyle.  Since his stent, he has even work harder on this, has hired a , has been eating healthy, lost weight and is eating a Mediterranean diet.  He is very motivated to keep these changes going.  He is adopted, so he does not know any of his family  medical history.      PHYSICAL EXAMINATION:   GENERAL:  Well-developed, well-nourished, delightful gentleman in no acute distress.   HEENT:  Normocephalic, atraumatic.   HEART:  Regular in rate and rhythm.  I do not appreciate murmur, rub or gallop.  Carotids are without bruit.   LUNGS:  Clear bilaterally.     EXTREMITIES:  Right radial access site is clean, dry and intact without ecchymosis.  Extremities are with trace peripheral edema.   SKIN:  Warm and dry.      ASSESSMENT AND PLAN:   1.  Unstable angina and coronary artery disease in a gentleman who had a 99% proximal LAD lesion at a very young age.  He is now status post drug-eluting stent to the proximal LAD and appropriately on aspirin, Effient, Crestor, beta blocker and ACE.  He has no recurrent anginal symptoms, and we spent a long time talking about lifestyle modification for him going forward.  He likely has familial hyperlipidemia, and this will be treated aggressively as well.  The patient is motivated to make these changes, and I think he will do well.   2.  Dyslipidemia with a lipid panel showing a total cholesterol of 257, HDL 23, LDL was unable to be calculated, his triglycerides were 2016.  He started on Crestor 40.  This has not changed his leg pain at all.  This all preceded him being on Crestor, and he does not think it is an adverse effect.  As such, we are going to continue his Crestor 40.  We are going to recheck his lipids in about 4 weeks.  I expect they will still be elevated at that time and we can consider adding a second agent, which I think the best choice may end up being Repatha or Praluent.  We will see what his lipids show at that time.   3.  Hypertension, now well controlled.   4.  Obesity, improving.  Encouraged ongoing exercise and weight loss.      We will see him back in about 3 months, and I will do his lipids again in 1 month.  He will need a BMP at that time.  He can get a BMP at his primary today.      Thank you for  allowing me to participate in his care.      AURORA Mandel PA-C             D: 2017 11:47   T: 2017 05:34   MT: [email or web address]      Name:     CECILIO BLANCO   MRN:      [id number]        Account:      [id number]   :      1970           Service Date: 2017      Document: [id number]

## 2021-11-19 NOTE — TELEPHONE ENCOUNTER
Physical Therapy     Referred by: Dorothy Oneal MD; Medical Diagnosis (from order):    Diagnosis Information      Diagnosis    737.30 (ICD-9-CM) - M41.20 (ICD-10-CM) - Idiopathic scoliosis and kyphoscoliosis    724.1, 338.29 (ICD-9-CM) - M54.6, G89.29 (ICD-10-CM) - Chronic bilateral thoracic back pain                Daily Treatment Note    Visit:  14   Patient alert and oriented X3.    SUBJECTIVE                                                                                                               States low back pain increased to 8/10 after walking 10 minutes in the pool When doing LE ROM exercises at 30 % WB pain decreased to 3/10. States she is able to get discounted health club rate through program with her  but won't join until after the holiday.     OBJECTIVE                                                                                                                        TREATMENT                                                                                                                  Therapeutic Exercise:  POOL THERAPY  Multi directional gait   Standing back extension   Standing marching, hip ext, hip abd, squat, heel/toe raises  Standing with narrow base of support, shoulder ROM with paddles  Deep H2O with noodle vertical stabilization, bicycling, flutter kick, hip abd    Skilled input: verbal instruction/cues, posture correction and tactile instruction/cues    Writer verbally educated and received verbal consent for hand placement, positioning of patient, and techniques to be performed today from patient for therapist position for techniques and hand placement and palpation for techniques as described above and how they are pertinent to the patient's plan of care.       ASSESSMENT                                                                                                             Strongly encourage patient to join pool to control pain during busy holiday season.        PLAN    Stress test was placed.  Patient called and notified of test placed.  No further action needed.    Luiza Austin, CMA                                                                                                                            Suggestions for next session as indicated: Progress per plan of care         Therapy procedure time and total treatment time can be found documented on the Time Entry flowsheet

## 2024-01-16 NOTE — MR AVS SNAPSHOT
After Visit Summary   5/18/2017    Cecil Vasquez    MRN: [id number]           Patient Information     Date Of Birth          1970        Visit Information        Provider Department      5/18/2017 1:00 PM Gonzalo Cole MD [facility]        Today's Diagnoses     Essential hypertension with goal blood pressure less than 140/90    -  1    Neuropathy (H)           Follow-ups after your visit        Your next 10 appointments already scheduled     Balta 15, 2017 10:00 AM CDT   Return Visit with Rose Durham PA-C   [facility] ([facility]    [address] [place] [id number] [phone]              Who to contact     If you have questions or need follow up information about today's clinic visit or your schedule please contact [facility] directly at [phone].  Normal or non-critical lab and imaging results will be communicated to you by MyChart, letter or phone within 4 business days after the clinic has received the results. If you do not hear from us within 7 days, please contact the clinic through MyChart or phone. If you have a critical or abnormal lab result, we will notify you by phone as soon as possible.  Submit refill requests through [employer] Time or call your pharmacy and they will forward the refill request to us. Please allow 3 business days for your refill to be completed.          Additional Information About Your Visit        Care EveryWhere ID     This is your Care EveryWhere ID. This could be used by other organizations to access your [place] medical records  [id number]        Your Vitals Were     Pulse Temperature Respirations Pulse Oximetry BMI (Body Mass Index)       104 98.4  F (36.9  C) (Temporal) 20 98% 36.87 kg/m2        Blood Pressure from Last 3 Encounters:   05/18/17 130/89   04/20/17 144/88   04/12/17 103/69    Weight from Last 3 Encounters:   05/18/17 113.3 kg (249 lb 11.2 oz)    04/20/17 112.2 kg (247 lb 4.8 oz)   04/11/17 115 kg (253 lb 8.5 oz)              Today, you had the following     No orders found for display         Today's Medication Changes          These changes are accurate as of: 5/18/17 11:59 PM.  If you have any questions, ask your nurse or doctor.               These medicines have changed or have updated prescriptions.        Dose/Directions    oxyCODONE 5 MG IR tablet   Commonly known as:  ROXICODONE   This may have changed:  Another medication with the same name was removed. Continue taking this medication, and follow the directions you see here.   Used for:  Neuropathy (H)   Changed by:  Gonzalo Cole MD        Dose:  5 mg   Take 1 tablet (5 mg) by mouth every 4 hours as needed for pain   Quantity:  70 tablet   Refills:  0            Where to get your medicines      Some of these will need a paper prescription and others can be bought over the counter.  Ask your nurse if you have questions.     Bring a paper prescription for each of these medications     oxyCODONE 5 MG IR tablet    traMADol 50 MG tablet                Primary Care Provider Office Phone # Fax #    Gonzalo Cole [phone]       [address] [place] [address]        Thank you!     Thank you for choosing [facility]  for your care. Our goal is always to provide you with excellent care. Hearing back from our patients is one way we can continue to improve our services. Please take a few minutes to complete the written survey that you may receive in the mail after your visit with us. Thank you!             Your Updated Medication List - Protect others around you: Learn how to safely use, store and throw away your medicines at www.disposemymeds.org.          This list is accurate as of: 5/18/17 11:59 PM.  Always use your most recent med list.                   Brand Name Dispense Instructions for use    aspirin 81 MG EC  tablet     90 tablet    Take 1 tablet (81 mg) by mouth daily       gabapentin 600 MG tablet    NEURONTIN    240 tablet    TAKE TWO TABLETS BY MOUTH THREE TIMES A DAY, increase by 1 tab every 2-3 days, until max of 5400 mg daily       ipratropium - albuterol 0.5 mg/2.5 mg/3 mL 0.5-2.5 (3) MG/3ML neb solution    DUONEB    30 vial    Take 1 vial (3 mLs) by nebulization every 4 hours as needed for shortness of breath / dyspnea       lisinopril 10 MG tablet    PRINIVIL/ZESTRIL    90 tablet    Take 1 tablet (10 mg) by mouth daily       MAPAP 325 MG tablet   Generic drug:  acetaminophen     100 tablet    TAKE TWO TABLETS BY MOUTH EVERY 4 HOURS AS NEEDED FOR MILD PAIN       melatonin 3 MG tablet     30 tablet    Take 1 tablet (3 mg) by mouth nightly as needed for sleep       * metoprolol 50 MG 24 hr tablet    TOPROL-XL    90 tablet    Take 1 tablet (50 mg) by mouth daily       * metoprolol 25 MG 24 hr tablet    TOPROL-XL    30 tablet    TAKE ONE TABLET BY MOUTH EVERY DAY       nitroglycerin 0.4 MG sublingual tablet    NITROSTAT    25 tablet    For chest pain place 1 tablet under the tongue every 5 minutes for 3 doses. If symptoms persist 5 minutes after 1st dose call 911.       oxyCODONE 5 MG IR tablet    ROXICODONE    70 tablet    Take 1 tablet (5 mg) by mouth every 4 hours as needed for pain       prasugrel 10 MG Tabs tablet    EFFIENT    90 tablet    Take 1 tablet (10 mg) by mouth daily       rosuvastatin 40 MG tablet    CRESTOR    90 tablet    Take 1 tablet (40 mg) by mouth daily       traMADol 50 MG tablet    ULTRAM    120 tablet    Take 1-2 tablets ( mg) by mouth every 6 hours as needed for moderate pain       VENTOLIN  (90 BASE) MCG/ACT Inhaler   Generic drug:  albuterol     18 g    INHALE 2 PUFFS INTO THE LUNGS EVERY 4 HOURS AS NEEDED FOR SHORTNESS OF BREATH OR DIFFICULTY BREATHING       * Notice:  This list has 2 medication(s) that are the same as other medications prescribed for you. Read the  directions carefully, and ask your doctor or other care provider to review them with you.       98.7
